# Patient Record
Sex: MALE | Race: WHITE | NOT HISPANIC OR LATINO
[De-identification: names, ages, dates, MRNs, and addresses within clinical notes are randomized per-mention and may not be internally consistent; named-entity substitution may affect disease eponyms.]

---

## 2017-01-18 ENCOUNTER — MEDICATION RENEWAL (OUTPATIENT)
Age: 82
End: 2017-01-18

## 2017-01-25 ENCOUNTER — OTHER (OUTPATIENT)
Age: 82
End: 2017-01-25

## 2017-02-13 ENCOUNTER — APPOINTMENT (OUTPATIENT)
Dept: NEPHROLOGY | Facility: CLINIC | Age: 82
End: 2017-02-13

## 2017-02-13 VITALS
HEART RATE: 60 BPM | DIASTOLIC BLOOD PRESSURE: 60 MMHG | WEIGHT: 233 LBS | BODY MASS INDEX: 37.45 KG/M2 | SYSTOLIC BLOOD PRESSURE: 128 MMHG

## 2017-02-13 DIAGNOSIS — D64.9 ANEMIA, UNSPECIFIED: ICD-10-CM

## 2017-02-13 RX ORDER — NEBIVOLOL HYDROCHLORIDE 10 MG/1
10 TABLET ORAL
Qty: 90 | Refills: 0 | Status: DISCONTINUED | COMMUNITY
Start: 2016-08-31

## 2017-03-10 ENCOUNTER — MEDICATION RENEWAL (OUTPATIENT)
Age: 82
End: 2017-03-10

## 2017-03-10 ENCOUNTER — RX RENEWAL (OUTPATIENT)
Age: 82
End: 2017-03-10

## 2017-04-04 ENCOUNTER — RX RENEWAL (OUTPATIENT)
Age: 82
End: 2017-04-04

## 2017-04-06 LAB
ANION GAP SERPL CALC-SCNC: 8 MMOL/L
BUN SERPL-MCNC: 64 MG/DL
CALCIUM SERPL-MCNC: 9.1 MG/DL
CHLORIDE SERPL-SCNC: 111 MMOL/L
CO2 SERPL-SCNC: 25 MMOL/L
CREAT SERPL-MCNC: 2.24 MG/DL
GLUCOSE SERPL-MCNC: 109 MG/DL
POTASSIUM SERPL-SCNC: 5.9 MMOL/L
SODIUM SERPL-SCNC: 144 MMOL/L

## 2017-04-11 ENCOUNTER — APPOINTMENT (OUTPATIENT)
Dept: ENDOCRINOLOGY | Facility: CLINIC | Age: 82
End: 2017-04-11

## 2017-06-20 ENCOUNTER — APPOINTMENT (OUTPATIENT)
Dept: NEPHROLOGY | Facility: CLINIC | Age: 82
End: 2017-06-20

## 2017-06-20 VITALS — HEART RATE: 80 BPM | SYSTOLIC BLOOD PRESSURE: 138 MMHG | DIASTOLIC BLOOD PRESSURE: 60 MMHG

## 2017-06-20 DIAGNOSIS — N18.3 CHRONIC KIDNEY DISEASE, STAGE 3 (MODERATE): ICD-10-CM

## 2017-06-20 RX ORDER — DOXYCYCLINE HYCLATE 100 MG/1
100 TABLET ORAL
Qty: 14 | Refills: 0 | Status: DISCONTINUED | COMMUNITY
Start: 2017-01-11

## 2017-06-20 RX ORDER — DOXYCYCLINE HYCLATE 100 MG/1
100 CAPSULE ORAL
Qty: 14 | Refills: 0 | Status: DISCONTINUED | COMMUNITY
Start: 2017-01-06 | End: 2017-06-20

## 2017-09-08 ENCOUNTER — APPOINTMENT (OUTPATIENT)
Dept: NEPHROLOGY | Facility: CLINIC | Age: 82
End: 2017-09-08
Payer: MEDICARE

## 2017-09-08 VITALS
WEIGHT: 217 LBS | HEART RATE: 72 BPM | DIASTOLIC BLOOD PRESSURE: 60 MMHG | SYSTOLIC BLOOD PRESSURE: 124 MMHG | BODY MASS INDEX: 34.87 KG/M2

## 2017-09-08 PROCEDURE — 99215 OFFICE O/P EST HI 40 MIN: CPT

## 2017-09-13 ENCOUNTER — APPOINTMENT (OUTPATIENT)
Dept: ENDOCRINOLOGY | Facility: CLINIC | Age: 82
End: 2017-09-13
Payer: MEDICARE

## 2017-09-13 VITALS
HEART RATE: 78 BPM | WEIGHT: 222.44 LBS | BODY MASS INDEX: 35.75 KG/M2 | DIASTOLIC BLOOD PRESSURE: 65 MMHG | SYSTOLIC BLOOD PRESSURE: 132 MMHG | HEIGHT: 66.14 IN

## 2017-09-13 PROCEDURE — 99214 OFFICE O/P EST MOD 30 MIN: CPT

## 2017-09-25 RX ORDER — ROSUVASTATIN CALCIUM 10 MG/1
10 TABLET, FILM COATED ORAL
Qty: 90 | Refills: 0 | Status: DISCONTINUED | COMMUNITY
Start: 2017-02-17

## 2017-10-11 ENCOUNTER — APPOINTMENT (OUTPATIENT)
Dept: ENDOCRINOLOGY | Facility: CLINIC | Age: 82
End: 2017-10-11
Payer: MEDICARE

## 2017-10-11 PROCEDURE — 97802 MEDICAL NUTRITION INDIV IN: CPT

## 2017-11-28 ENCOUNTER — OUTPATIENT (OUTPATIENT)
Dept: OUTPATIENT SERVICES | Facility: HOSPITAL | Age: 82
LOS: 1 days | End: 2017-11-28

## 2017-11-28 ENCOUNTER — APPOINTMENT (OUTPATIENT)
Dept: OPHTHALMOLOGY | Facility: CLINIC | Age: 82
End: 2017-11-28

## 2017-11-29 DIAGNOSIS — H18.51 ENDOTHELIAL CORNEAL DYSTROPHY: ICD-10-CM

## 2017-12-04 ENCOUNTER — APPOINTMENT (OUTPATIENT)
Dept: NEPHROLOGY | Facility: CLINIC | Age: 82
End: 2017-12-04
Payer: MEDICARE

## 2017-12-04 VITALS — SYSTOLIC BLOOD PRESSURE: 128 MMHG | HEART RATE: 76 BPM | DIASTOLIC BLOOD PRESSURE: 60 MMHG

## 2017-12-04 PROCEDURE — 99215 OFFICE O/P EST HI 40 MIN: CPT

## 2017-12-14 ENCOUNTER — MEDICATION RENEWAL (OUTPATIENT)
Age: 82
End: 2017-12-14

## 2018-01-31 ENCOUNTER — MEDICATION RENEWAL (OUTPATIENT)
Age: 83
End: 2018-01-31

## 2018-02-05 ENCOUNTER — APPOINTMENT (OUTPATIENT)
Dept: NEPHROLOGY | Facility: CLINIC | Age: 83
End: 2018-02-05
Payer: MEDICARE

## 2018-02-05 VITALS — SYSTOLIC BLOOD PRESSURE: 136 MMHG | DIASTOLIC BLOOD PRESSURE: 62 MMHG | HEART RATE: 80 BPM

## 2018-02-05 PROCEDURE — 99215 OFFICE O/P EST HI 40 MIN: CPT

## 2018-02-23 ENCOUNTER — RX RENEWAL (OUTPATIENT)
Age: 83
End: 2018-02-23

## 2018-03-07 ENCOUNTER — APPOINTMENT (OUTPATIENT)
Dept: ENDOCRINOLOGY | Facility: CLINIC | Age: 83
End: 2018-03-07
Payer: MEDICARE

## 2018-03-07 VITALS
HEART RATE: 81 BPM | WEIGHT: 215 LBS | HEIGHT: 66 IN | BODY MASS INDEX: 34.55 KG/M2 | DIASTOLIC BLOOD PRESSURE: 71 MMHG | SYSTOLIC BLOOD PRESSURE: 148 MMHG

## 2018-03-07 PROCEDURE — 99214 OFFICE O/P EST MOD 30 MIN: CPT

## 2018-03-13 ENCOUNTER — RX RENEWAL (OUTPATIENT)
Age: 83
End: 2018-03-13

## 2018-05-07 ENCOUNTER — APPOINTMENT (OUTPATIENT)
Dept: NEPHROLOGY | Facility: CLINIC | Age: 83
End: 2018-05-07
Payer: MEDICARE

## 2018-05-07 VITALS
BODY MASS INDEX: 34.06 KG/M2 | SYSTOLIC BLOOD PRESSURE: 138 MMHG | WEIGHT: 211 LBS | HEART RATE: 72 BPM | DIASTOLIC BLOOD PRESSURE: 70 MMHG

## 2018-05-07 PROCEDURE — 99214 OFFICE O/P EST MOD 30 MIN: CPT

## 2018-05-16 ENCOUNTER — RX RENEWAL (OUTPATIENT)
Age: 83
End: 2018-05-16

## 2018-06-25 ENCOUNTER — APPOINTMENT (OUTPATIENT)
Dept: ENDOCRINOLOGY | Facility: CLINIC | Age: 83
End: 2018-06-25
Payer: MEDICARE

## 2018-06-25 VITALS
HEART RATE: 71 BPM | WEIGHT: 220 LBS | SYSTOLIC BLOOD PRESSURE: 136 MMHG | HEIGHT: 66 IN | BODY MASS INDEX: 35.36 KG/M2 | DIASTOLIC BLOOD PRESSURE: 61 MMHG

## 2018-06-25 PROCEDURE — 99214 OFFICE O/P EST MOD 30 MIN: CPT

## 2018-07-13 RX ORDER — SODIUM BICARBONATE 650 MG/1
650 TABLET ORAL TWICE DAILY
Qty: 180 | Refills: 3 | Status: DISCONTINUED | COMMUNITY
Start: 2017-02-13 | End: 2018-07-13

## 2018-07-16 ENCOUNTER — MEDICATION RENEWAL (OUTPATIENT)
Age: 83
End: 2018-07-16

## 2018-07-22 ENCOUNTER — RX RENEWAL (OUTPATIENT)
Age: 83
End: 2018-07-22

## 2018-08-13 ENCOUNTER — APPOINTMENT (OUTPATIENT)
Dept: NEPHROLOGY | Facility: CLINIC | Age: 83
End: 2018-08-13
Payer: MEDICARE

## 2018-08-13 VITALS
DIASTOLIC BLOOD PRESSURE: 60 MMHG | HEART RATE: 72 BPM | WEIGHT: 218 LBS | BODY MASS INDEX: 35.19 KG/M2 | SYSTOLIC BLOOD PRESSURE: 136 MMHG

## 2018-08-13 PROCEDURE — 99215 OFFICE O/P EST HI 40 MIN: CPT

## 2018-08-13 RX ORDER — SODIUM POLYSTYRENE SULFONATE 4.1 MEQ/G
POWDER, FOR SUSPENSION ORAL; RECTAL
Qty: 450 | Refills: 3 | Status: DISCONTINUED | COMMUNITY
Start: 2017-04-04 | End: 2018-08-13

## 2018-08-21 ENCOUNTER — MEDICATION RENEWAL (OUTPATIENT)
Age: 83
End: 2018-08-21

## 2018-10-24 ENCOUNTER — APPOINTMENT (OUTPATIENT)
Dept: ENDOCRINOLOGY | Facility: CLINIC | Age: 83
End: 2018-10-24
Payer: MEDICARE

## 2018-10-24 VITALS
HEART RATE: 74 BPM | BODY MASS INDEX: 35.52 KG/M2 | WEIGHT: 221 LBS | DIASTOLIC BLOOD PRESSURE: 67 MMHG | HEIGHT: 66 IN | SYSTOLIC BLOOD PRESSURE: 143 MMHG

## 2018-10-24 PROCEDURE — 99214 OFFICE O/P EST MOD 30 MIN: CPT

## 2018-10-26 ENCOUNTER — MEDICATION RENEWAL (OUTPATIENT)
Age: 83
End: 2018-10-26

## 2018-10-26 RX ORDER — SEMAGLUTIDE 1.34 MG/ML
2 INJECTION, SOLUTION SUBCUTANEOUS
Qty: 1 | Refills: 1 | Status: DISCONTINUED | COMMUNITY
Start: 2018-10-24 | End: 2018-10-26

## 2018-11-06 ENCOUNTER — APPOINTMENT (OUTPATIENT)
Dept: NEPHROLOGY | Facility: CLINIC | Age: 83
End: 2018-11-06
Payer: MEDICARE

## 2018-11-06 VITALS — DIASTOLIC BLOOD PRESSURE: 58 MMHG | HEART RATE: 68 BPM | SYSTOLIC BLOOD PRESSURE: 128 MMHG

## 2018-11-06 PROCEDURE — 99215 OFFICE O/P EST HI 40 MIN: CPT

## 2019-01-23 ENCOUNTER — APPOINTMENT (OUTPATIENT)
Dept: ENDOCRINOLOGY | Facility: CLINIC | Age: 84
End: 2019-01-23
Payer: MEDICARE

## 2019-01-23 VITALS
SYSTOLIC BLOOD PRESSURE: 149 MMHG | WEIGHT: 215 LBS | DIASTOLIC BLOOD PRESSURE: 69 MMHG | BODY MASS INDEX: 34.7 KG/M2 | HEART RATE: 75 BPM

## 2019-01-23 PROCEDURE — 99214 OFFICE O/P EST MOD 30 MIN: CPT

## 2019-01-23 NOTE — HISTORY OF PRESENT ILLNESS
[FreeTextEntry1] : glucose log reviewed, testing 2x/day.\par am: 160 (today), 186, 140, 134, 148, 134, 211\par after dinner: 146, 197, 253, 202, 221\par had gout flare up on Jan 1\par BP is always lower with PCP than here\par got shingles vaccine and pneumonia vaccine booster\par went to [place] last month, was exercising more, swimming. lost some weight\par trying to eat less; when goes out to eat, will share entree with wife\par doing 1.5-2h of core and stretching while watching [employer].  walking in place at home, for 100 steps (each leg)\par no chest pain\par has occasional, fleeting neuropathy symptoms.\par \par PMH: CKD\par sleep apnea, has CPAP machine\par gout, arthritis\par Paget's s/p Reclast in 6/2014. alk phos normal since.\par \par Meds:\par Lantus 24 units, Victoza 1.8mg/day (ozempic not covered)\par Prandin 2mg tid with meals, TID. skips lunch dose if walking in afternoon\par amlodipine 10mg, doxazosin 8mg hs\par Flonase, azelastine, Claritin, montelukast\par furosemide 40mg, metolazone 5mg\par aspirin 81mg, Crestor 20mg\par ranitidine 150mg/day\par allopurinol 100mg, colchicine prn\par vitamin D 5000/day, Citrucel, Miralax, glucosamine\par Breeze strips testing 2x/day

## 2019-01-23 NOTE — ASSESSMENT
[FreeTextEntry1] : Diabetes, with nephropathy.  goal A1c < 7.0%,  A1c just above goal. Advised to eat out less, try to prepare more home meals, encouraged continued weight loss.  \par continue current regimen.\par RTO 4 months

## 2019-01-23 NOTE — DATA REVIEWED
[FreeTextEntry1] : 11/18: A1c 7.3%, tot chol 100, trig 106, HDL 43, LDL 36, Cr 2.43, TSH 2.49, alk phos 80\par 10/18: A1c 7.2%, tot chol 94, HDL 37, trig 222, LDL 29, Cr 2.96\par 8/18: tot chol 102, HDL 40, LDL 35\par 7/18: tot chol 115, HDL 48, LDL 37, trig 259, A1c 7.1%, Hb 12.7\par 5/18: A1c 7.3%,  tot chol 99, trig 87, HDL 41, LDL 41, Cr 2.68\par 2/18: Cr 2.39, tot chol 145, HDL 36, trig 439, , 25D 39\par 1/18: A1c 7.3%\par 12/17: A1c 7.1%, tot chol 204, trig 212, HDL 36, \par 9/17: Cr 2.71, alk phos 88, tot chol 205, HDL 38, , trig 369\par 5/17: A1c 5.9%, tot chol 113, trig 83, HDL 54, LDL 42, TSH 1.40, Cr 2.22

## 2019-01-23 NOTE — PHYSICAL EXAM
[Alert] : alert [Healthy Appearance] : healthy appearance [Normal Voice/Communication] : normal voice communication [No Proptosis] : no proptosis [No Lid Lag] : no lid lag [Normal Hearing] : hearing was normal [Thyroid Not Enlarged] : the thyroid was not enlarged [No Thyroid Nodules] : there were no palpable thyroid nodules [Clear to Auscultation] : lungs were clear to auscultation bilaterally [Normal S1, S2] : normal S1 and S2 [Regular Rhythm] : with a regular rhythm [Pedal Pulses Normal] : the pedal pulses are present [Normal Sensation on Monofilament Testing] : normal sensation on monofilament testing of lower extremities [Normal Affect] : the affect was normal [Normal Mood] : the mood was normal [Foot Ulcers] : no foot ulcers [de-identified] : fingerstick 123, post lunch [de-identified] : thyroid palpable [de-identified] : 2+ LE edema [de-identified] : no onychomycoses

## 2019-01-25 ENCOUNTER — MEDICATION RENEWAL (OUTPATIENT)
Age: 84
End: 2019-01-25

## 2019-01-25 RX ORDER — FLASH GLUCOSE SCANNING READER
EACH MISCELLANEOUS
Qty: 1 | Refills: 0 | Status: DISCONTINUED | COMMUNITY
Start: 2019-01-25 | End: 2019-01-25

## 2019-01-25 RX ORDER — BLOOD SUGAR DIAGNOSTIC, DISC
STRIP MISCELLANEOUS
Qty: 100 | Refills: 5 | Status: DISCONTINUED | COMMUNITY
Start: 2017-09-13 | End: 2019-01-25

## 2019-01-25 RX ORDER — FLASH GLUCOSE SENSOR
KIT MISCELLANEOUS
Qty: 2 | Refills: 5 | Status: DISCONTINUED | COMMUNITY
Start: 2019-01-25 | End: 2019-01-25

## 2019-01-31 ENCOUNTER — MEDICATION RENEWAL (OUTPATIENT)
Age: 84
End: 2019-01-31

## 2019-01-31 RX ORDER — LANCETS
EACH MISCELLANEOUS
Qty: 1 | Refills: 5 | Status: DISCONTINUED | COMMUNITY
Start: 2019-01-25 | End: 2019-01-31

## 2019-02-11 ENCOUNTER — MEDICATION RENEWAL (OUTPATIENT)
Age: 84
End: 2019-02-11

## 2019-02-26 ENCOUNTER — APPOINTMENT (OUTPATIENT)
Dept: NEPHROLOGY | Facility: CLINIC | Age: 84
End: 2019-02-26
Payer: MEDICARE

## 2019-02-26 VITALS
WEIGHT: 214 LBS | HEART RATE: 68 BPM | SYSTOLIC BLOOD PRESSURE: 126 MMHG | DIASTOLIC BLOOD PRESSURE: 66 MMHG | BODY MASS INDEX: 34.54 KG/M2

## 2019-02-26 PROCEDURE — 99215 OFFICE O/P EST HI 40 MIN: CPT

## 2019-02-26 NOTE — HISTORY OF PRESENT ILLNESS
[FreeTextEntry1] : 82 y/o man here for f/u evaluation of CKD 4, HTN, DM, CAD,  proteinuria, gout, hyperkalemia, hyperlipidemia, LE edema and anemia, KACI on CPAP.\par Had 1 episode of gout in Jan, 2019 (more frequent visits)\par trying to be mindful\par weight stable 216\par Denies flank pain, dysuria, hematuria. No CP or SOB\par left shin with "bump"- mild tenderness no trauma\par

## 2019-02-26 NOTE — REASON FOR VISIT
[Follow-Up] : a follow-up visit [FreeTextEntry1] : for CKD 4, HTN proteinuria, anemia, hyperuricemia and  LE edema

## 2019-02-26 NOTE — REVIEW OF SYSTEMS
[Fever] : no fever [Chills] : no chills [Feeling Poorly] : not feeling poorly [Feeling Tired] : feeling tired [Heart Rate Is Slow] : slow heart rate [Heart Rate Is Fast] : the heart rate was not fast [Chest Pain] : no chest pain [Palpitations] : no palpitations [Negative] : Heme/Lymph

## 2019-02-26 NOTE — ASSESSMENT
[FreeTextEntry1] : all lab data was reviewed with patient in detail from 2/20/2019\par 82 yo man with CKD 4, DM, hyperuricemia; edema, HTN proteinuria and anemia \par -- CKD 4-  creat stabilized at 2.96; not uremic\par remains aware of possible need for RRT in future.- \par --hyperuricemia/gout --trial of increase allopurinol to 250 mg (100 mg tabs 2 1/2 tabs daily)\par has not needed NSAIDs recently\par --HTN-- BP at goal- but reports  that some BPs at other MD offices have been elevated- counseled on need to avoid salt-- \par trying a touch less diuretic to see if this helps reduce B/creat without worsening BP or edema-low salt intake imperative\par -edema- looks good- trial of reduce metolazone from daily to every other day\par -Metabolic acidosis-  CO2   31- stable range- but will reduce diuretic as above\par -hyperkalemia-    K remains < 5--  c/w  potassium restriction\par -. Anemia Hgb stable at 11.8- very good--  No NAVIN therapy needed at this time \par -proteinuria-  keep off RAAS blockade for elevated creat and potassium \par -overweight -  emphasized need for weight loss and exercise\par -skin lesion-  left shin- advised to f/u with derm\par f/u OV 3 months\par

## 2019-02-26 NOTE — PHYSICAL EXAM
[General Appearance - Alert] : alert [General Appearance - Well Nourished] : well nourished [General Appearance - Well Developed] : well developed [Sclera] : the sclera and conjunctiva were normal [PERRL With Normal Accommodation] : pupils were equal in size, round, and reactive to light [Extraocular Movements] : extraocular movements were intact [Neck Appearance] : the appearance of the neck was normal [Neck Cervical Mass (___cm)] : no neck mass was observed [Jugular Venous Distention Increased] : there was no jugular-venous distention [] : no respiratory distress [Auscultation Breath Sounds / Voice Sounds] : lungs were clear to auscultation bilaterally [Heart Sounds] : normal S1 and S2 [Heart Sounds Gallop] : no gallops [Murmurs] : no murmurs [Heart Sounds Pericardial Friction Rub] : no pericardial rub [Cervical Lymph Nodes Enlarged Anterior Bilaterally] : anterior cervical [Supraclavicular Lymph Nodes Enlarged Bilaterally] : supraclavicular [No CVA Tenderness] : no ~M costovertebral angle tenderness [No Spinal Tenderness] : no spinal tenderness [Abnormal Walk] : normal gait [FreeTextEntry1] : stasis changes improved- 1cm area of keratosis narrow rim of erythema- [Oriented To Time, Place, And Person] : oriented to person, place, and time

## 2019-03-05 ENCOUNTER — RX RENEWAL (OUTPATIENT)
Age: 84
End: 2019-03-05

## 2019-03-14 ENCOUNTER — MEDICATION RENEWAL (OUTPATIENT)
Age: 84
End: 2019-03-14

## 2019-04-01 ENCOUNTER — MEDICATION RENEWAL (OUTPATIENT)
Age: 84
End: 2019-04-01

## 2019-04-08 ENCOUNTER — RX RENEWAL (OUTPATIENT)
Age: 84
End: 2019-04-08

## 2019-04-24 ENCOUNTER — RX RENEWAL (OUTPATIENT)
Age: 84
End: 2019-04-24

## 2019-04-29 ENCOUNTER — MEDICATION RENEWAL (OUTPATIENT)
Age: 84
End: 2019-04-29

## 2019-05-22 ENCOUNTER — APPOINTMENT (OUTPATIENT)
Dept: ENDOCRINOLOGY | Facility: CLINIC | Age: 84
End: 2019-05-22
Payer: MEDICARE

## 2019-05-22 VITALS
DIASTOLIC BLOOD PRESSURE: 55 MMHG | WEIGHT: 218 LBS | SYSTOLIC BLOOD PRESSURE: 125 MMHG | BODY MASS INDEX: 35.19 KG/M2 | HEART RATE: 64 BPM

## 2019-05-22 PROCEDURE — 99214 OFFICE O/P EST MOD 30 MIN: CPT

## 2019-05-23 NOTE — HISTORY OF PRESENT ILLNESS
[FreeTextEntry1] : not testing glucose regularly\par was in [place] for two weeks and swam most days\par no polyuria, polydipsia, SOB, chest pain, or neuropathy symptoms \par saw ophtho last week.  has "very small amount of diabetes, but no change from 3months ago"\par seldom has hypoglycemia symptoms, typically before dinner after prolonged walking\par melanoma removed from top of head three weeks ago\par \par PMH: CKD\par sleep apnea, has CPAP machine\par gout, arthritis\par Paget's s/p Reclast in 6/2014. alk phos normal since.\par melanoma, top of head 2019 s/p Moh's\par \par Meds:\par Lantus 24 units, Victoza 1.8mg/day (ozempic not covered)\par Prandin 2mg tid with meals, TID. skips lunch dose if walking in afternoon\par amlodipine 10mg, doxazosin 8mg hs\par Flonase, azelastine, Claritin, montelukast\par furosemide 40mg, metolazone 5mg\par aspirin 81mg, Crestor 20mg\par ranitidine 150mg/day\par allopurinol 100mg, colchicine prn\par vitamin D 5000/day, Citrucel, Miralax, glucosamine\par Breeze strips testing 2x/day

## 2019-05-23 NOTE — ASSESSMENT
[FreeTextEntry1] : Diabetes, CKD.  A1c at/below goal.  A1c lower than last visit, which I hope is from improvement of glucose control and not from CKD (which can cause falsely low A1c readings).\par continue current regimen.\par RTO 4 months

## 2019-05-23 NOTE — DATA REVIEWED
[FreeTextEntry1] : 5/19: A1c 6.7%, Cr 3.27\par 2/19: A1c 7.1%, Cr 3.04, tot chol 108, trig 141, HDL 48, LDL 38\par 10/18: A1c 7.2%, tot chol 94, HDL 37, trig 222, LDL 29, Cr 2.96\par 8/18: tot chol 102, HDL 40, LDL 35\par 7/18: tot chol 115, HDL 48, LDL 37, trig 259, A1c 7.1%, Hb 12.7\par 5/18: A1c 7.3%,  tot chol 99, trig 87, HDL 41, LDL 41, Cr 2.68\par 2/18: Cr 2.39, tot chol 145, HDL 36, trig 439, , 25D 39\par 1/18: A1c 7.3%\par 12/17: A1c 7.1%, tot chol 204, trig 212, HDL 36, \par 9/17: Cr 2.71, alk phos 88, tot chol 205, HDL 38, , trig 369\par 5/17: A1c 5.9%, tot chol 113, trig 83, HDL 54, LDL 42, TSH 1.40, Cr 2.22

## 2019-05-23 NOTE — PHYSICAL EXAM
[Alert] : alert [Healthy Appearance] : healthy appearance [Normal Voice/Communication] : normal voice communication [No Proptosis] : no proptosis [No Lid Lag] : no lid lag [Normal Hearing] : hearing was normal [Thyroid Not Enlarged] : the thyroid was not enlarged [No Thyroid Nodules] : there were no palpable thyroid nodules [Clear to Auscultation] : lungs were clear to auscultation bilaterally [Normal S1, S2] : normal S1 and S2 [Regular Rhythm] : with a regular rhythm [Pedal Pulses Normal] : the pedal pulses are present [Normal Sensation on Monofilament Testing] : normal sensation on monofilament testing of lower extremities [Normal Affect] : the affect was normal [Normal Mood] : the mood was normal [Foot Ulcers] : no foot ulcers [Acanthosis Nigricans] : no acanthosis nigricans [de-identified] : fingerstick 90, 2h post lunch (salad) [de-identified] : thyroid palpable [de-identified] : trace edema [de-identified] : no onychomycoses. actinic keratosis lesion on LLE, 2cm

## 2019-06-07 ENCOUNTER — MOBILE ON CALL (OUTPATIENT)
Age: 84
End: 2019-06-07

## 2019-06-12 ENCOUNTER — APPOINTMENT (OUTPATIENT)
Dept: NEPHROLOGY | Facility: CLINIC | Age: 84
End: 2019-06-12
Payer: MEDICARE

## 2019-06-12 VITALS — SYSTOLIC BLOOD PRESSURE: 150 MMHG | HEART RATE: 67 BPM | DIASTOLIC BLOOD PRESSURE: 70 MMHG

## 2019-06-12 PROCEDURE — 99214 OFFICE O/P EST MOD 30 MIN: CPT

## 2019-06-15 NOTE — REVIEW OF SYSTEMS
[Feeling Tired] : feeling tired [Heart Rate Is Slow] : slow heart rate [Negative] : Endocrine [Fever] : no fever [Chills] : no chills [Feeling Poorly] : not feeling poorly [Heart Rate Is Fast] : the heart rate was not fast [Chest Pain] : no chest pain [Palpitations] : no palpitations

## 2019-06-15 NOTE — PHYSICAL EXAM
[General Appearance - Alert] : alert [General Appearance - Well Nourished] : well nourished [General Appearance - Well Developed] : well developed [Sclera] : the sclera and conjunctiva were normal [PERRL With Normal Accommodation] : pupils were equal in size, round, and reactive to light [Neck Appearance] : the appearance of the neck was normal [Extraocular Movements] : extraocular movements were intact [Neck Cervical Mass (___cm)] : no neck mass was observed [Jugular Venous Distention Increased] : there was no jugular-venous distention [] : no respiratory distress [Auscultation Breath Sounds / Voice Sounds] : lungs were clear to auscultation bilaterally [Heart Sounds] : normal S1 and S2 [Heart Sounds Pericardial Friction Rub] : no pericardial rub [Heart Sounds Gallop] : no gallops [Murmurs] : no murmurs [Cervical Lymph Nodes Enlarged Anterior Bilaterally] : anterior cervical [Supraclavicular Lymph Nodes Enlarged Bilaterally] : supraclavicular [No CVA Tenderness] : no ~M costovertebral angle tenderness [Abnormal Walk] : normal gait [No Spinal Tenderness] : no spinal tenderness [Oriented To Time, Place, And Person] : oriented to person, place, and time [FreeTextEntry1] : stasis changes improved

## 2019-06-15 NOTE — ASSESSMENT
[FreeTextEntry1] : all lab data was reviewed with patient in detail from  6/6/2019\par 82 yo man with CKD 4, DM, hyperuricemia; edema, HTN proteinuria and anemia . \par -- CKD 4- creatinine at 3.49 from 3.2;  electrolytes okay; euvolemic,\par  no uremic features, talked again for the need of RRT in the near future - he verbalized understanding for that  \par --hyperuricemia/gout --no recent attack - on allopurinol 2 tabl. 1/2 \par --HTN-- not on goal 150/90 - talked to him to continue to monitor it in the next few days and to report if it is still on the high side can increase his medications the diuretics  to 40 mg twice a day - Lasix \par - diabetes - acceptable  HA1c - 7.0 - the endocrinologist wants to switch to another medications - from renal \par prospective no contraindication if it is renal dosed\par --anemia- Hgb 11.2- okay TSat drifting down- will need to start Fe tabs\par -proteinuria stable- optimize with BP control and weight loss\par \par Follow up in 3 to 4 months

## 2019-06-15 NOTE — HISTORY OF PRESENT ILLNESS
[FreeTextEntry1] : 84 y/o man with CKD 4, HTN, DM, CAD,  proteinuria, gout, hyperkalemia, here for f/u evaluation.\par  No new complaints - no shortness of breath, no chest pain, no swelling of the lower extremities, no urinary problems.\par  Had a recent excision of melanoma over his scalp. \par using  lasix 20 twice a day, Metolazone 5 mg and amlodipine 5 mg. \par Fair control in the mornings around 130/80 BP and in the afternoons around 140/90\par Denies flank pain, dysuria, hematuria or frothy urine \par

## 2019-06-26 ENCOUNTER — APPOINTMENT (OUTPATIENT)
Dept: NEPHROLOGY | Facility: CLINIC | Age: 84
End: 2019-06-26

## 2019-07-09 ENCOUNTER — MEDICATION RENEWAL (OUTPATIENT)
Age: 84
End: 2019-07-09

## 2019-07-09 RX ORDER — LIRAGLUTIDE 6 MG/ML
18 INJECTION SUBCUTANEOUS
Qty: 1 | Refills: 5 | Status: DISCONTINUED | COMMUNITY
Start: 2018-10-26 | End: 2019-07-09

## 2019-09-11 ENCOUNTER — APPOINTMENT (OUTPATIENT)
Dept: NEPHROLOGY | Facility: CLINIC | Age: 84
End: 2019-09-11
Payer: MEDICARE

## 2019-09-11 VITALS — HEART RATE: 66 BPM | DIASTOLIC BLOOD PRESSURE: 68 MMHG | SYSTOLIC BLOOD PRESSURE: 136 MMHG

## 2019-09-11 PROCEDURE — 99215 OFFICE O/P EST HI 40 MIN: CPT

## 2019-09-15 NOTE — HISTORY OF PRESENT ILLNESS
[FreeTextEntry1] : 83 y/o man here for f/u evaluation of CKD 4, HTN, DM, CAD,  proteinuria, gout, hyperkalemia.\par since last OV had 1 gout attack- used colchicine-\par feels okay today.\par using  lasix 20 twice a day, Metolazone 5 mg and amlodipine 5 mg. \par home BPs variable\par Denies flank pain, dysuria, hematuria or frothy urine \par

## 2019-09-15 NOTE — REVIEW OF SYSTEMS
[Feeling Tired] : feeling tired [Heart Rate Is Slow] : slow heart rate [Negative] : Heme/Lymph [Fever] : no fever [Chills] : no chills [Heart Rate Is Fast] : the heart rate was not fast [Feeling Poorly] : not feeling poorly [Chest Pain] : no chest pain [Palpitations] : no palpitations

## 2019-09-15 NOTE — PHYSICAL EXAM
[General Appearance - Alert] : alert [General Appearance - Well Nourished] : well nourished [General Appearance - Well Developed] : well developed [Sclera] : the sclera and conjunctiva were normal [PERRL With Normal Accommodation] : pupils were equal in size, round, and reactive to light [Extraocular Movements] : extraocular movements were intact [Neck Appearance] : the appearance of the neck was normal [Neck Cervical Mass (___cm)] : no neck mass was observed [Jugular Venous Distention Increased] : there was no jugular-venous distention [] : no respiratory distress [Auscultation Breath Sounds / Voice Sounds] : lungs were clear to auscultation bilaterally [Heart Sounds] : normal S1 and S2 [Heart Sounds Gallop] : no gallops [Murmurs] : no murmurs [Heart Sounds Pericardial Friction Rub] : no pericardial rub [Cervical Lymph Nodes Enlarged Anterior Bilaterally] : anterior cervical [Supraclavicular Lymph Nodes Enlarged Bilaterally] : supraclavicular [No CVA Tenderness] : no ~M costovertebral angle tenderness [No Spinal Tenderness] : no spinal tenderness [Abnormal Walk] : normal gait [Oriented To Time, Place, And Person] : oriented to person, place, and time [FreeTextEntry1] : tr- 1+ ankle edema

## 2019-09-15 NOTE — ASSESSMENT
[FreeTextEntry1] : all lab data was reviewed with patient in detail from 9/5/2019\par 83 yo man with CKD 4, DM, hyperuricemia; edema, HTN proteinuria and anemia . \par -- CKD 4-  creat up- 3.86; eGFR 14electrolytes acceptable\par no uremic symptoms\par had detailed discussion in regard to future need for renal replacement therapy including all modalities- HD, PD and transplant - age limits tx unless present with living donor  \par --hyperuricemia/gout -- UA 5.2- okay; needs weight loss allopurinol 2 tabl. 1/2 \par --HTN-- not on goal 150/90 - talked to him to continue to monitor it in the next few days and to report if it is still on the high side can increase his medications the diuretics  to 40 mg twice a day - Lasix \par - diabetes -  HA1c - 6.8%- okay\par --anemia- Hgb 11.3- stable c/w Fe tabs\par -proteinuria stable-  2.1 gm- \par -hyperparathyroidism- PTH- 154 acceptable\par -hyperphosphatemia- start binders if rising for next OV\par Follow up 2 months

## 2019-09-18 ENCOUNTER — APPOINTMENT (OUTPATIENT)
Dept: ENDOCRINOLOGY | Facility: CLINIC | Age: 84
End: 2019-09-18
Payer: MEDICARE

## 2019-09-18 VITALS
DIASTOLIC BLOOD PRESSURE: 61 MMHG | HEIGHT: 66 IN | HEART RATE: 62 BPM | BODY MASS INDEX: 35.36 KG/M2 | WEIGHT: 220 LBS | SYSTOLIC BLOOD PRESSURE: 150 MMHG

## 2019-09-18 PROCEDURE — 99214 OFFICE O/P EST MOD 30 MIN: CPT

## 2019-09-18 RX ORDER — PEN NEEDLE, DIABETIC 32 GX 1/4"
32G X 6 MM NEEDLE, DISPOSABLE MISCELLANEOUS
Qty: 100 | Refills: 5 | Status: DISCONTINUED | COMMUNITY
Start: 2018-07-16 | End: 2019-09-18

## 2019-09-18 RX ORDER — METOLAZONE 5 MG/1
5 TABLET ORAL DAILY
Qty: 90 | Refills: 3 | Status: DISCONTINUED | COMMUNITY
Start: 2017-06-20 | End: 2019-09-18

## 2019-09-18 NOTE — DATA REVIEWED
[FreeTextEntry1] : 9/19: A1c 6.8%, tot chol 112, HDL 42, trig 245, LDL 39, Cr 3.86\par 5/19: A1c 6.7%, Cr 3.27\par 2/19: A1c 7.1%, Cr 3.04, tot chol 108, trig 141, HDL 48, LDL 38\par 10/18: A1c 7.2%, tot chol 94, HDL 37, trig 222, LDL 29, Cr 2.96\par 8/18: tot chol 102, HDL 40, LDL 35\par 7/18: tot chol 115, HDL 48, LDL 37, trig 259, A1c 7.1%, Hb 12.7\par 5/18: A1c 7.3%,  tot chol 99, trig 87, HDL 41, LDL 41, Cr 2.68\par 2/18: Cr 2.39, tot chol 145, HDL 36, trig 439, , 25D 39\par 1/18: A1c 7.3%\par 12/17: A1c 7.1%, tot chol 204, trig 212, HDL 36, \par 9/17: Cr 2.71, alk phos 88, tot chol 205, HDL 38, , trig 369\par 5/17: A1c 5.9%, tot chol 113, trig 83, HDL 54, LDL 42, TSH 1.40, Cr 2.22

## 2019-09-18 NOTE — PHYSICAL EXAM
[Alert] : alert [Healthy Appearance] : healthy appearance [Normal Voice/Communication] : normal voice communication [No Proptosis] : no proptosis [No Lid Lag] : no lid lag [Thyroid Not Enlarged] : the thyroid was not enlarged [Normal Hearing] : hearing was normal [No Thyroid Nodules] : there were no palpable thyroid nodules [Normal S1, S2] : normal S1 and S2 [Clear to Auscultation] : lungs were clear to auscultation bilaterally [Pedal Pulses Normal] : the pedal pulses are present [Regular Rhythm] : with a regular rhythm [Normal Sensation on Monofilament Testing] : normal sensation on monofilament testing of lower extremities [Normal Affect] : the affect was normal [Normal Mood] : the mood was normal [Foot Ulcers] : no foot ulcers [Acanthosis Nigricans] : no acanthosis nigricans [de-identified] : thyroid palpable [de-identified] : trace edema [de-identified] : no onychomycoses

## 2019-09-18 NOTE — HISTORY OF PRESENT ILLNESS
[FreeTextEntry1] : not testing glucose \par has stress test scheduled (pharmacologic) and will be NPO for the test. worried about hypoglycemia, which occurs sometimes anyway when he is late with his lunch.\par had one hypoglycemia in middle of the night, had taken all his meds (including Prandin) at bedtime instead of dinner time.\par kidney function is declining and Dr Dover talked to him about dialysis options if Cr does not improve (will be retested next month)\par saw ophtho in the summer, was told he has "virtually no DR" -- only the very smallest amount\par no chest pain or SOB\par no neuropathy symptoms\par \par \par PMH: CKD\par sleep apnea, has CPAP machine\par gout, arthritis\par Paget's s/p Reclast in 6/2014. 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 skips lunch dose if walking in afternoon\par amlodipine 10mg, doxazosin 8mg hs\par Flonase, azelastine, Claritin, montelukast\par furosemide 20mg once/day\par aspirin 81mg, Crestor 20mg\par ranitidine 150mg/day\par allopurinol 100mg, colchicine prn\par vitamin D 5000/day, Citrucel, Miralax, glucosamine\par Breeze strips testing 2x/day

## 2019-09-18 NOTE — ASSESSMENT
[FreeTextEntry1] : Diabetes, CKD.  A1c at/below goal.  \par will reduce Prandin to 1mg with meals, since A1c has been below goal and he is reporting occasional hypoglycemia symptoms.  Advised to test sugars to make sure glucose is not increasing with reduction in Prandin dose (since currently he is not testing--restart testing)\par reduce Lantus to 20 units the night before stress test, and hold prandin on the morning of stress test.  Resume taking once he is eating normal po intake.\par Continue Ozempic\par RTO 4 months\par

## 2019-09-23 ENCOUNTER — OUTPATIENT (OUTPATIENT)
Dept: OUTPATIENT SERVICES | Facility: HOSPITAL | Age: 84
LOS: 1 days | End: 2019-09-23
Payer: MEDICARE

## 2019-09-23 DIAGNOSIS — R06.2 WHEEZING: ICD-10-CM

## 2019-09-23 DIAGNOSIS — R06.02 SHORTNESS OF BREATH: ICD-10-CM

## 2019-09-23 DIAGNOSIS — E11.9 TYPE 2 DIABETES MELLITUS WITHOUT COMPLICATIONS: ICD-10-CM

## 2019-09-23 DIAGNOSIS — I10 ESSENTIAL (PRIMARY) HYPERTENSION: ICD-10-CM

## 2019-09-23 LAB — GLUCOSE BLDC GLUCOMTR-MCNC: 94 MG/DL — SIGNIFICANT CHANGE UP (ref 70–99)

## 2019-09-23 PROCEDURE — 82962 GLUCOSE BLOOD TEST: CPT

## 2019-09-23 PROCEDURE — 78452 HT MUSCLE IMAGE SPECT MULT: CPT

## 2019-09-23 PROCEDURE — A9500: CPT

## 2019-09-23 PROCEDURE — 93018 CV STRESS TEST I&R ONLY: CPT

## 2019-09-23 PROCEDURE — 93017 CV STRESS TEST TRACING ONLY: CPT

## 2019-09-23 PROCEDURE — 78452 HT MUSCLE IMAGE SPECT MULT: CPT | Mod: 26

## 2019-09-23 PROCEDURE — 93016 CV STRESS TEST SUPVJ ONLY: CPT

## 2019-10-04 ENCOUNTER — RX RENEWAL (OUTPATIENT)
Age: 84
End: 2019-10-04

## 2019-10-24 ENCOUNTER — APPOINTMENT (OUTPATIENT)
Dept: NEPHROLOGY | Facility: CLINIC | Age: 84
End: 2019-10-24
Payer: MEDICARE

## 2019-10-24 VITALS — DIASTOLIC BLOOD PRESSURE: 58 MMHG | HEART RATE: 60 BPM | SYSTOLIC BLOOD PRESSURE: 136 MMHG

## 2019-10-24 VITALS — DIASTOLIC BLOOD PRESSURE: 57 MMHG | HEART RATE: 59 BPM | SYSTOLIC BLOOD PRESSURE: 144 MMHG

## 2019-10-24 DIAGNOSIS — E87.2 ACIDOSIS: ICD-10-CM

## 2019-10-24 PROCEDURE — 99215 OFFICE O/P EST HI 40 MIN: CPT

## 2019-10-24 NOTE — ASSESSMENT
[FreeTextEntry1] : all lab data was reviewed with patient in detail from 10/16/2019\par 85 yo man with CKD 4, DM, hyperuricemia; edema, HTN, proteinuria and anemia\par --CKD 4-  creat down to 3.16 (eGFR 17) from 3.86 (eGFR 14) last month. electrolytes acceptable, no uremic symptoms but discussed again in regard to future need for renal replacement\par --hyperuricemia/gout -- UA 5.2- okay; needs weight loss, allopurinol 2 tab 1/2 \par --HTN-- not on goal, labile -  Instructed to increase lasix and continue to monitor BP daily while taking lasix 2 tabs twice daily for next 3 days, then only twice daily.  Take only one daily if wt 212lbs, increase to 3 or 4 daily if wt >215 and hold for weight < 210\par -- diabetes -  HA1c - 6.8%- okay\par --anemia- Hgb down to 10.4 from 11.3 last month- TSat 22%- try increase Fe to 2 tabs daily- if causes GI distress or constipation, then will dc and arrange for IVFe- uses miralax regularly\par  If Hgb decreases < 10 will begin procrit. \par -proteinuria stable-  2.1 gm- \par -hyperparathyroidism- PTH- 154 acceptable\par -hyperphosphatemia- start binders if rising for next OV\par \par labs ordered for next visit\par Follow up 6-8 weeks

## 2019-10-24 NOTE — REASON FOR VISIT
[Follow-Up] : a follow-up visit [Other: _____] : [unfilled] [FreeTextEntry1] : for CKD 4, HTN proteinuria, anemia, hyperuricemia and LE edema

## 2019-10-24 NOTE — PHYSICAL EXAM
[General Appearance - Well Nourished] : well nourished [General Appearance - Alert] : alert [PERRL With Normal Accommodation] : pupils were equal in size, round, and reactive to light [Sclera] : the sclera and conjunctiva were normal [General Appearance - Well Developed] : well developed [Extraocular Movements] : extraocular movements were intact [Neck Appearance] : the appearance of the neck was normal [Neck Cervical Mass (___cm)] : no neck mass was observed [Jugular Venous Distention Increased] : there was no jugular-venous distention [] : no respiratory distress [Auscultation Breath Sounds / Voice Sounds] : lungs were clear to auscultation bilaterally [Heart Sounds] : normal S1 and S2 [Heart Sounds Gallop] : no gallops [Murmurs] : no murmurs [Heart Sounds Pericardial Friction Rub] : no pericardial rub [Cervical Lymph Nodes Enlarged Anterior Bilaterally] : anterior cervical [Supraclavicular Lymph Nodes Enlarged Bilaterally] : supraclavicular [No CVA Tenderness] : no ~M costovertebral angle tenderness [No Spinal Tenderness] : no spinal tenderness [Abnormal Walk] : normal gait [Oriented To Time, Place, And Person] : oriented to person, place, and time [Heart Rate And Rhythm] : heart rate was normal and rhythm regular [No Focal Deficits] : no focal deficits [FreeTextEntry1] : stasis changes improved

## 2019-10-24 NOTE — HISTORY OF PRESENT ILLNESS
[FreeTextEntry1] : 83 y/o man for f/u evaluation of CKD 4, HTN, DM, CAD, proteinuria, gout, hyperkalemia.\par Feeling well today. \par From last OV he had understood that he was to decrease his lasix to 20mg daily, but now more swelling. Also had stopped metolazone since last visit. \par Did not check BPs this past month, was okay at recent stress test, reported to be negative exam. Usually variable BP readings. \par State that his weight about 212lbs a couple weeks ago.  Up to about 217 now. \par followed up with endo - prandin reduced to 1mg from 2mg daily\par last gout attack this past summer- used colchicine-\par Denies flank pain, dysuria, hematuria or frothy urine \par Denies HA, CP, SOB.

## 2019-10-24 NOTE — REVIEW OF SYSTEMS
[Feeling Tired] : feeling tired [Heart Rate Is Slow] : slow heart rate [Negative] : Heme/Lymph [Fever] : no fever [Chills] : no chills [Feeling Poorly] : not feeling poorly [Heart Rate Is Fast] : the heart rate was not fast [Chest Pain] : no chest pain [Palpitations] : no palpitations

## 2019-10-25 RX ORDER — ERYTHROPOIETIN 20000 [IU]/ML
20000 INJECTION, SOLUTION INTRAVENOUS; SUBCUTANEOUS
Qty: 8 | Refills: 3 | Status: DISCONTINUED | COMMUNITY
Start: 2017-03-10 | End: 2019-10-25

## 2019-12-05 ENCOUNTER — APPOINTMENT (OUTPATIENT)
Dept: NEPHROLOGY | Facility: CLINIC | Age: 84
End: 2019-12-05
Payer: MEDICARE

## 2019-12-05 ENCOUNTER — MEDICATION RENEWAL (OUTPATIENT)
Age: 84
End: 2019-12-05

## 2019-12-05 VITALS — SYSTOLIC BLOOD PRESSURE: 136 MMHG | DIASTOLIC BLOOD PRESSURE: 54 MMHG | HEART RATE: 80 BPM

## 2019-12-05 DIAGNOSIS — E78.5 HYPERLIPIDEMIA, UNSPECIFIED: ICD-10-CM

## 2019-12-05 DIAGNOSIS — I10 ESSENTIAL (PRIMARY) HYPERTENSION: ICD-10-CM

## 2019-12-05 DIAGNOSIS — E79.0 HYPERURICEMIA W/OUT SIGNS OF INFLAMMATORY ARTHRITIS AND TOPHACEOUS DISEASE: ICD-10-CM

## 2019-12-05 PROCEDURE — 99214 OFFICE O/P EST MOD 30 MIN: CPT

## 2019-12-06 NOTE — PHYSICAL EXAM
[General Appearance - Alert] : alert [General Appearance - Well Nourished] : well nourished [General Appearance - Well Developed] : well developed [Sclera] : the sclera and conjunctiva were normal [PERRL With Normal Accommodation] : pupils were equal in size, round, and reactive to light [Extraocular Movements] : extraocular movements were intact [Neck Appearance] : the appearance of the neck was normal [Neck Cervical Mass (___cm)] : no neck mass was observed [Jugular Venous Distention Increased] : there was no jugular-venous distention [] : no respiratory distress [Auscultation Breath Sounds / Voice Sounds] : lungs were clear to auscultation bilaterally [Heart Rate And Rhythm] : heart rate was normal and rhythm regular [Heart Sounds] : normal S1 and S2 [Heart Sounds Gallop] : no gallops [Murmurs] : no murmurs [Heart Sounds Pericardial Friction Rub] : no pericardial rub [Cervical Lymph Nodes Enlarged Anterior Bilaterally] : anterior cervical [No CVA Tenderness] : no ~M costovertebral angle tenderness [Supraclavicular Lymph Nodes Enlarged Bilaterally] : supraclavicular [No Spinal Tenderness] : no spinal tenderness [Abnormal Walk] : normal gait [No Focal Deficits] : no focal deficits [Oriented To Time, Place, And Person] : oriented to person, place, and time [FreeTextEntry1] : stasis changes improved

## 2019-12-06 NOTE — HISTORY OF PRESENT ILLNESS
[FreeTextEntry1] : 83 yo man with hx of CKD 4, HTN, DM, CAD, proteinuria, gout, hyperkalemia for f/u evaluation.\par Still feeling well, no N/V, or uremic symptoms.  \par Taking lasix 20mg BID.  Reports weight being in stable range between 212-215.  States being 213 today.\par Off metolazone.  Some swelling but controlled.  \par Has not been checking BPs but states that it is okay at other doctor visits (120/70).  Recent stress test reportedly negative.  Usually variable BP readings. \par last gout attack this past summer- used colchicine-\par Denies flank pain, dysuria, hematuria or frothy urine \par Denies HA, CP, SOB.

## 2019-12-06 NOTE — ASSESSMENT
[FreeTextEntry1] : all lab data was reviewed with patient in detail from 11/12 and 11/22/2019\par 83 yo man with CKD 4/5, DM, hyperuricemia; edema, HTN, proteinuria and anemia\par --CKD 4/5-  creat up 4.29 (eGFR 12) from 3.16 (eGFR 17) in Oct. electrolytes acceptable, no uremic symptoms but again had long discussion about future need for RRT or renal transplant after discussing with family members (daughter mentioned she would be willing to donate).  If agrees, he is to schedule appointment with transplant center. Would proceed with HD, considering home hemo. (PD poor option given body habitus and hx of constipation)\par --hyperuricemia/gout - UA 5.4 okay; needs weight loss, continue allopurinol 2.5 tabs daily\par --HTN- acceptable today -  continue lasix BID. to monitor BP more frequently. Take only one daily if wt <210lbs, increase to 3 or 4 daily if wt >215. \par -- diabetes -  HgbA1C 5.7%- good\par --anemia- Hgb 10.8 acceptable- TSat 16%- continue Fe  2 tabs daily- okay to defer IVFe for now- uses miralax regularly\par  If Hgb decreases < 10 will begin procrit. \par -proteinuria - rise from 2.1 gm to 6g,  will have him repeat. Off RAAS blockade for increased Cr and hyperkalemia\par -hyperparathyroidism- , previously 154 in Sept.  If continues to rise, add 1,25 vit D\par -hyperphosphatemia- phos 5.1 okay. start binders if rising\par \par labs ordered for next visit\par Follow up 6-8 weeks

## 2019-12-06 NOTE — REVIEW OF SYSTEMS
[Feeling Tired] : feeling tired [Heart Rate Is Slow] : slow heart rate [Negative] : Heme/Lymph [Fever] : no fever [Chills] : no chills [Feeling Poorly] : not feeling poorly [Heart Rate Is Fast] : the heart rate was not fast [Palpitations] : no palpitations [Chest Pain] : no chest pain

## 2020-01-16 ENCOUNTER — APPOINTMENT (OUTPATIENT)
Dept: ENDOCRINOLOGY | Facility: CLINIC | Age: 85
End: 2020-01-16
Payer: MEDICARE

## 2020-01-16 VITALS
DIASTOLIC BLOOD PRESSURE: 59 MMHG | HEART RATE: 63 BPM | WEIGHT: 215 LBS | BODY MASS INDEX: 34.7 KG/M2 | SYSTOLIC BLOOD PRESSURE: 153 MMHG

## 2020-01-16 PROCEDURE — 99214 OFFICE O/P EST MOD 30 MIN: CPT

## 2020-01-16 RX ORDER — BLOOD-GLUCOSE METER
W/DEVICE EACH MISCELLANEOUS
Qty: 1 | Refills: 0 | Status: DISCONTINUED | COMMUNITY
Start: 2019-01-25 | End: 2020-01-16

## 2020-01-16 RX ORDER — LANCING DEVICE/LANCETS
KIT MISCELLANEOUS
Qty: 1 | Refills: 0 | Status: DISCONTINUED | COMMUNITY
Start: 2019-01-23 | End: 2020-01-16

## 2020-01-16 RX ORDER — BLOOD SUGAR DIAGNOSTIC
STRIP MISCELLANEOUS 3 TIMES DAILY
Qty: 1 | Refills: 5 | Status: DISCONTINUED | COMMUNITY
Start: 2019-01-25 | End: 2020-01-16

## 2020-01-16 RX ORDER — LANCETS
EACH MISCELLANEOUS
Qty: 100 | Refills: 5 | Status: DISCONTINUED | COMMUNITY
Start: 2019-01-23 | End: 2020-01-16

## 2020-01-16 NOTE — ASSESSMENT
[FreeTextEntry1] : Diabetes, CKD.  A1c at/below goal\par Low A1c partly due to CKD but he is having hypoglycemia, so will reduce all his meds.\par Reduce Lantus to 20 units/day.  Reduce Ozempic to 0.5mg/week, reduce Prandin to 0.5mg BID (take with breakfast and dinner only, not lunch)\par Explained that it is typical to be able to reduce dose of meds as GFR/kidney function declines because medication duration will be longer with reduced kidney function.\par RTO 4 months\par

## 2020-01-16 NOTE — HISTORY OF PRESENT ILLNESS
[FreeTextEntry1] : glucose log reviewed, had to buy new meter while in [place].  now using [employer] meter\par AM readings this week:  75, 70, 87, 80\par bedtime: 147, 52 (had hypo sx), 95, 85, 173 (forgot pills)\par has hypo awareness.  does not take Prandin dose if he will be out walking.  tries to eat before glucose get too low\par urinating normal amount\par no polyuria, polydipsia, SOB, chest pain, or neuropathy symptoms \par up to date with ophtho, due in April\par takes extra diuretic when weight is over 215lb and reduces dose when weight is < 212.  usually weight is between 212 and 215\par kidney function declining.  was told that he probably will need dialysis in the next year, and he is meeting with kidney transplant centers to see if he is a transplant candidate.\par \par PMH: CKD\par sleep apnea, has CPAP machine\par gout, arthritis\par Paget's s/p Reclast in 6/2014. 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 skips lunch dose if walking in afternoon\par amlodipine 10mg, doxazosin 8mg hs\par Flonase, azelastine, Claritin, montelukast\par furosemide 20mg, 2 tab/day\par aspirin 81mg, Crestor 20mg\par ranitidine 150mg/day\par allopurinol 100mg bid, colchicine prn\par vitamin D 5000/day, Citrucel, Miralax, glucosamine\par

## 2020-01-16 NOTE — PHYSICAL EXAM
[Alert] : alert [Healthy Appearance] : healthy appearance [Normal Voice/Communication] : normal voice communication [No Proptosis] : no proptosis [No Lid Lag] : no lid lag [Normal Hearing] : hearing was normal [Thyroid Not Enlarged] : the thyroid was not enlarged [No Thyroid Nodules] : there were no palpable thyroid nodules [Clear to Auscultation] : lungs were clear to auscultation bilaterally [Normal S1, S2] : normal S1 and S2 [Regular Rhythm] : with a regular rhythm [Pedal Pulses Normal] : the pedal pulses are present [Normal Sensation on Monofilament Testing] : normal sensation on monofilament testing of lower extremities [Normal Affect] : the affect was normal [Normal Mood] : the mood was normal [Foot Ulcers] : no foot ulcers [Acanthosis Nigricans] : no acanthosis nigricans [de-identified] : fingerstick 86, post lunch, no prandin, soup and duck leg [de-identified] : thyroid palpable [de-identified] : trace edema [de-identified] : no onychomycoses

## 2020-01-16 NOTE — DATA REVIEWED
[FreeTextEntry1] : 1/20: A1c 5.7%, Cr 3.69, GFR 14\par 11/19: A1c 5.7%, Cr 4.29, GFR 12, , Ca 9.0; tot chol 128, trig 83, HDL 65, \par 9/19: A1c 6.8%, tot chol 112, HDL 42, trig 245, LDL 39, Cr 3.86\par 5/19: A1c 6.7%, Cr 3.27\par 2/19: A1c 7.1%, Cr 3.04, tot chol 108, trig 141, HDL 48, LDL 38\par 10/18: A1c 7.2%, tot chol 94, HDL 37, trig 222, LDL 29, Cr 2.96\par 8/18: tot chol 102, HDL 40, LDL 35\par 7/18: tot chol 115, HDL 48, LDL 37, trig 259, A1c 7.1%, Hb 12.7\par 5/18: A1c 7.3%,  tot chol 99, trig 87, HDL 41, LDL 41, Cr 2.68\par 2/18: Cr 2.39, tot chol 145, HDL 36, trig 439, , 25D 39\par 1/18: A1c 7.3%\par 12/17: A1c 7.1%, tot chol 204, trig 212, HDL 36, \par 9/17: Cr 2.71, alk phos 88, tot chol 205, HDL 38, , trig 369\par 5/17: A1c 5.9%, tot chol 113, trig 83, HDL 54, LDL 42, TSH 1.40, Cr 2.22

## 2020-01-27 ENCOUNTER — APPOINTMENT (OUTPATIENT)
Dept: NEPHROLOGY | Facility: CLINIC | Age: 85
End: 2020-01-27
Payer: MEDICARE

## 2020-01-27 VITALS — DIASTOLIC BLOOD PRESSURE: 56 MMHG | SYSTOLIC BLOOD PRESSURE: 140 MMHG | HEART RATE: 70 BPM

## 2020-01-27 DIAGNOSIS — R60.0 LOCALIZED EDEMA: ICD-10-CM

## 2020-01-27 DIAGNOSIS — N18.4 CHRONIC KIDNEY DISEASE, STAGE 4 (SEVERE): ICD-10-CM

## 2020-01-27 DIAGNOSIS — M10.9 GOUT, UNSPECIFIED: ICD-10-CM

## 2020-01-27 DIAGNOSIS — E11.40 TYPE 2 DIABETES MELLITUS WITH DIABETIC NEUROPATHY, UNSPECIFIED: ICD-10-CM

## 2020-01-27 DIAGNOSIS — E66.9 OBESITY, UNSPECIFIED: ICD-10-CM

## 2020-01-27 DIAGNOSIS — E11.65 TYPE 2 DIABETES MELLITUS WITH DIABETIC NEUROPATHY, UNSPECIFIED: ICD-10-CM

## 2020-01-27 PROCEDURE — 99215 OFFICE O/P EST HI 40 MIN: CPT

## 2020-01-28 NOTE — ASSESSMENT
[FreeTextEntry1] : all lab data was reviewed with patient in detail from 1/10/20\par 83 yo man with CKD 4/5, DM, hyperuricemia; edema, HTN, proteinuria and anemia\par -CKD 5-  creat stable at 3.69 (eGFR 17) > 4.29 (eGFR 12) in Nov < 3.16 (eGFR 17) in Oct. electrolytes acceptable, no uremic symptoms but would prefer to have transplant soon if eligible donor.  No eminent need for dialysis now but would proceed with HD if necessary, considering home hemo. (PD poor option given body habitus and hx of constipation).  Follow up with transplant center appointments, instructed pt to have labs forwarded.   \par partner willing to donate her kidney if she qualifies- will keep me apprised\par --hyperuricemia/gout - UA 5.1, good; needs weight loss, continue allopurinol 2 tabs daily\par --HTN- acceptable today -  continue lasix BID and routine BP monitoring. Take only one daily if wt <210lbs, increase to 3 or 4 daily if wt >215. \par -- diabetes -  HgbA1C 5.7%- good, but likely due to sustained insulin effect due to declining renal function.  continue dose reductions as instructed per Dr. Tamayo. \par --anemia- Hgb 10.4 acceptable- continue 2 tabs Fe daily- okay to defer IVFe for now- uses miralax regularly\par  If Hgb decreases < 10 will begin procrit. \par -proteinuria - tim from 6g from 2.1,  will likely repeat at transplant appointments. Off RAAS blockade due to increased Cr and hyperkalemia\par -hyperparathyroidism- , previously 154 in Sept.  If continues to rise, add 1,25 vit D\par -hyperphosphatemia- phos 5.1 okay. start binders if rising\par \par Follow up every 2 months

## 2020-01-28 NOTE — HISTORY OF PRESENT ILLNESS
[FreeTextEntry1] : 85 yo M for follow-up with hx of CKD 5, HTN, DM, CAD, proteinuria, gout, hyperkalemia. \par Had spend a month in [place], was able to get a PCP there who suggested cuttin back on DM medications.  Followed up with Dr. Tamayo, lantus reduced to 20U (from 24U), prandin to 0.5 BID (from 1mg TID), and ozempic 0.5mg per week (from 1mg). He has appt tomorrow at [facility] transplant center and [facility] transplant center on Wed.  Partner willing to donate if possible as well as his daughter but she has high BMI.\par Still feeling well, no uremic symptoms.  \par Taking lasix 20mg BID.  Stable wt range between 212-215.  Wt 214 today, takes more Lasix when wt increases. Off metolazone.  Some swelling but controlled.  \par BPs have been good. Stress test last year (2019) reportedly negative. \par last gout attack this past summer- used colchicine-\par Denies flank pain, dysuria, hematuria or frothy urine \par Denies HA, CP, SOB.

## 2020-01-28 NOTE — PHYSICAL EXAM
[General Appearance - Alert] : alert [General Appearance - Well Nourished] : well nourished [General Appearance - Well Developed] : well developed [Sclera] : the sclera and conjunctiva were normal [PERRL With Normal Accommodation] : pupils were equal in size, round, and reactive to light [Extraocular Movements] : extraocular movements were intact [Neck Appearance] : the appearance of the neck was normal [Neck Cervical Mass (___cm)] : no neck mass was observed [Jugular Venous Distention Increased] : there was no jugular-venous distention [] : no respiratory distress [Auscultation Breath Sounds / Voice Sounds] : lungs were clear to auscultation bilaterally [Heart Rate And Rhythm] : heart rate was normal and rhythm regular [Heart Sounds] : normal S1 and S2 [Heart Sounds Gallop] : no gallops [Murmurs] : no murmurs [Heart Sounds Pericardial Friction Rub] : no pericardial rub [Cervical Lymph Nodes Enlarged Anterior Bilaterally] : anterior cervical [Supraclavicular Lymph Nodes Enlarged Bilaterally] : supraclavicular [No CVA Tenderness] : no ~M costovertebral angle tenderness [No Spinal Tenderness] : no spinal tenderness [Abnormal Walk] : normal gait [No Focal Deficits] : no focal deficits [Oriented To Time, Place, And Person] : oriented to person, place, and time [FreeTextEntry1] : stasis changes improved

## 2020-02-25 ENCOUNTER — RX RENEWAL (OUTPATIENT)
Age: 85
End: 2020-02-25

## 2020-03-23 ENCOUNTER — APPOINTMENT (OUTPATIENT)
Dept: NEPHROLOGY | Facility: CLINIC | Age: 85
End: 2020-03-23

## 2020-04-01 ENCOUNTER — INPATIENT (INPATIENT)
Facility: HOSPITAL | Age: 85
LOS: 20 days | Discharge: HOME | DRG: 177 | End: 2020-04-22
Attending: HOSPITALIST | Admitting: HOSPITALIST
Payer: MEDICARE

## 2020-04-01 VITALS
TEMPERATURE: 99 F | SYSTOLIC BLOOD PRESSURE: 161 MMHG | HEART RATE: 84 BPM | RESPIRATION RATE: 20 BRPM | OXYGEN SATURATION: 86 % | DIASTOLIC BLOOD PRESSURE: 69 MMHG

## 2020-04-01 DIAGNOSIS — I10 ESSENTIAL (PRIMARY) HYPERTENSION: ICD-10-CM

## 2020-04-01 DIAGNOSIS — R63.8 OTHER SYMPTOMS AND SIGNS CONCERNING FOOD AND FLUID INTAKE: ICD-10-CM

## 2020-04-01 DIAGNOSIS — I21.4 NON-ST ELEVATION (NSTEMI) MYOCARDIAL INFARCTION: ICD-10-CM

## 2020-04-01 DIAGNOSIS — E78.5 HYPERLIPIDEMIA, UNSPECIFIED: ICD-10-CM

## 2020-04-01 DIAGNOSIS — M10.9 GOUT, UNSPECIFIED: ICD-10-CM

## 2020-04-01 DIAGNOSIS — N19 UNSPECIFIED KIDNEY FAILURE: ICD-10-CM

## 2020-04-01 DIAGNOSIS — J18.9 PNEUMONIA, UNSPECIFIED ORGANISM: ICD-10-CM

## 2020-04-01 DIAGNOSIS — E11.9 TYPE 2 DIABETES MELLITUS WITHOUT COMPLICATIONS: ICD-10-CM

## 2020-04-01 LAB
ALBUMIN SERPL ELPH-MCNC: 3.2 G/DL — LOW (ref 3.3–5)
ALP SERPL-CCNC: 68 U/L — SIGNIFICANT CHANGE UP (ref 40–120)
ALT FLD-CCNC: 91 U/L — HIGH (ref 10–45)
ANION GAP SERPL CALC-SCNC: 16 MMOL/L — SIGNIFICANT CHANGE UP (ref 5–17)
APPEARANCE UR: ABNORMAL
AST SERPL-CCNC: 82 U/L — HIGH (ref 10–40)
BACTERIA # UR AUTO: PRESENT /HPF
BASE EXCESS BLDV CALC-SCNC: -8.2 MMOL/L — SIGNIFICANT CHANGE UP
BASOPHILS # BLD AUTO: 0.01 K/UL — SIGNIFICANT CHANGE UP (ref 0–0.2)
BASOPHILS NFR BLD AUTO: 0.1 % — SIGNIFICANT CHANGE UP (ref 0–2)
BILIRUB SERPL-MCNC: 0.5 MG/DL — SIGNIFICANT CHANGE UP (ref 0.2–1.2)
BILIRUB UR-MCNC: NEGATIVE — SIGNIFICANT CHANGE UP
BUN SERPL-MCNC: 114 MG/DL — HIGH (ref 7–23)
CA-I SERPL-SCNC: 1.25 MMOL/L — SIGNIFICANT CHANGE UP (ref 1.12–1.3)
CALCIUM SERPL-MCNC: 9.6 MG/DL — SIGNIFICANT CHANGE UP (ref 8.4–10.5)
CHLORIDE SERPL-SCNC: 112 MMOL/L — HIGH (ref 96–108)
CK MB CFR SERPL CALC: 4.7 NG/ML — SIGNIFICANT CHANGE UP (ref 0–6.7)
CK SERPL-CCNC: 288 U/L — HIGH (ref 30–200)
CO2 SERPL-SCNC: 17 MMOL/L — LOW (ref 22–31)
COLOR SPEC: YELLOW — SIGNIFICANT CHANGE UP
COMMENT - URINE: SIGNIFICANT CHANGE UP
COMMENT - URINE: SIGNIFICANT CHANGE UP
CREAT SERPL-MCNC: 6.73 MG/DL — HIGH (ref 0.5–1.3)
CRP SERPL-MCNC: 7.85 MG/DL — HIGH (ref 0–0.4)
D DIMER BLD IA.RAPID-MCNC: 189 NG/ML DDU — SIGNIFICANT CHANGE UP
DIFF PNL FLD: ABNORMAL
EOSINOPHIL # BLD AUTO: 0 K/UL — SIGNIFICANT CHANGE UP (ref 0–0.5)
EOSINOPHIL NFR BLD AUTO: 0 % — SIGNIFICANT CHANGE UP (ref 0–6)
EPI CELLS # UR: SIGNIFICANT CHANGE UP /HPF (ref 0–5)
FERRITIN SERPL-MCNC: 1737 NG/ML — HIGH (ref 30–400)
GAS PNL BLDV: 141 MMOL/L — SIGNIFICANT CHANGE UP (ref 138–146)
GAS PNL BLDV: SIGNIFICANT CHANGE UP
GAS PNL BLDV: SIGNIFICANT CHANGE UP
GLUCOSE BLDC GLUCOMTR-MCNC: 215 MG/DL — HIGH (ref 70–99)
GLUCOSE SERPL-MCNC: 206 MG/DL — HIGH (ref 70–99)
GLUCOSE UR QL: NEGATIVE — SIGNIFICANT CHANGE UP
HCO3 BLDV-SCNC: 16 MMOL/L — LOW (ref 20–27)
HCT VFR BLD CALC: 32 % — LOW (ref 39–50)
HGB BLD-MCNC: 10.6 G/DL — LOW (ref 13–17)
IMM GRANULOCYTES NFR BLD AUTO: 2 % — HIGH (ref 0–1.5)
KETONES UR-MCNC: NEGATIVE — SIGNIFICANT CHANGE UP
LACTATE SERPL-SCNC: 1 MMOL/L — SIGNIFICANT CHANGE UP (ref 0.5–2)
LDH SERPL L TO P-CCNC: 493 U/L — HIGH (ref 50–242)
LEUKOCYTE ESTERASE UR-ACNC: ABNORMAL
LYMPHOCYTES # BLD AUTO: 0.53 K/UL — LOW (ref 1–3.3)
LYMPHOCYTES # BLD AUTO: 5.5 % — LOW (ref 13–44)
MCHC RBC-ENTMCNC: 33.1 GM/DL — SIGNIFICANT CHANGE UP (ref 32–36)
MCHC RBC-ENTMCNC: 33.2 PG — SIGNIFICANT CHANGE UP (ref 27–34)
MCV RBC AUTO: 100.3 FL — HIGH (ref 80–100)
MONOCYTES # BLD AUTO: 0.41 K/UL — SIGNIFICANT CHANGE UP (ref 0–0.9)
MONOCYTES NFR BLD AUTO: 4.3 % — SIGNIFICANT CHANGE UP (ref 2–14)
NEUTROPHILS # BLD AUTO: 8.45 K/UL — HIGH (ref 1.8–7.4)
NEUTROPHILS NFR BLD AUTO: 88.1 % — HIGH (ref 43–77)
NITRITE UR-MCNC: NEGATIVE — SIGNIFICANT CHANGE UP
NRBC # BLD: 0 /100 WBCS — SIGNIFICANT CHANGE UP (ref 0–0)
PCO2 BLDV: 30 MMHG — LOW (ref 41–51)
PH BLDV: 7.36 — SIGNIFICANT CHANGE UP (ref 7.32–7.43)
PH UR: 6 — SIGNIFICANT CHANGE UP (ref 5–8)
PLATELET # BLD AUTO: 255 K/UL — SIGNIFICANT CHANGE UP (ref 150–400)
PO2 BLDV: 31 MMHG — SIGNIFICANT CHANGE UP
POTASSIUM BLDV-SCNC: 4.8 MMOL/L — SIGNIFICANT CHANGE UP (ref 3.5–4.9)
POTASSIUM SERPL-MCNC: 5 MMOL/L — SIGNIFICANT CHANGE UP (ref 3.5–5.3)
POTASSIUM SERPL-SCNC: 5 MMOL/L — SIGNIFICANT CHANGE UP (ref 3.5–5.3)
PROCALCITONIN SERPL-MCNC: 0.38 NG/ML — HIGH (ref 0.02–0.1)
PROT SERPL-MCNC: 6.5 G/DL — SIGNIFICANT CHANGE UP (ref 6–8.3)
PROT UR-MCNC: 100 MG/DL
RBC # BLD: 3.19 M/UL — LOW (ref 4.2–5.8)
RBC # FLD: 13.3 % — SIGNIFICANT CHANGE UP (ref 10.3–14.5)
RBC CASTS # UR COMP ASSIST: < 5 /HPF — SIGNIFICANT CHANGE UP
SAO2 % BLDV: 55 % — SIGNIFICANT CHANGE UP
SARS-COV-2 RNA SPEC QL NAA+PROBE: DETECTED
SODIUM SERPL-SCNC: 145 MMOL/L — SIGNIFICANT CHANGE UP (ref 135–145)
SP GR SPEC: 1.02 — SIGNIFICANT CHANGE UP (ref 1–1.03)
TROPONIN T SERPL-MCNC: 0.09 NG/ML — CRITICAL HIGH (ref 0–0.01)
UROBILINOGEN FLD QL: 0.2 E.U./DL — SIGNIFICANT CHANGE UP
WBC # BLD: 9.59 K/UL — SIGNIFICANT CHANGE UP (ref 3.8–10.5)
WBC # FLD AUTO: 9.59 K/UL — SIGNIFICANT CHANGE UP (ref 3.8–10.5)
WBC UR QL: < 5 /HPF — SIGNIFICANT CHANGE UP

## 2020-04-01 PROCEDURE — 99223 1ST HOSP IP/OBS HIGH 75: CPT | Mod: CS

## 2020-04-01 PROCEDURE — 99285 EMERGENCY DEPT VISIT HI MDM: CPT

## 2020-04-01 PROCEDURE — 99223 1ST HOSP IP/OBS HIGH 75: CPT | Mod: GC

## 2020-04-01 PROCEDURE — 93010 ELECTROCARDIOGRAM REPORT: CPT

## 2020-04-01 PROCEDURE — 71045 X-RAY EXAM CHEST 1 VIEW: CPT | Mod: 26

## 2020-04-01 RX ORDER — HEPARIN SODIUM 5000 [USP'U]/ML
5000 INJECTION INTRAVENOUS; SUBCUTANEOUS EVERY 8 HOURS
Refills: 0 | Status: DISCONTINUED | OUTPATIENT
Start: 2020-04-01 | End: 2020-04-15

## 2020-04-01 RX ORDER — ATORVASTATIN CALCIUM 80 MG/1
20 TABLET, FILM COATED ORAL AT BEDTIME
Refills: 0 | Status: DISCONTINUED | OUTPATIENT
Start: 2020-04-01 | End: 2020-04-22

## 2020-04-01 RX ORDER — HYDROXYCHLOROQUINE SULFATE 200 MG
200 TABLET ORAL EVERY 12 HOURS
Refills: 0 | Status: DISCONTINUED | OUTPATIENT
Start: 2020-04-02 | End: 2020-04-02

## 2020-04-01 RX ORDER — GLUCAGON INJECTION, SOLUTION 0.5 MG/.1ML
1 INJECTION, SOLUTION SUBCUTANEOUS ONCE
Refills: 0 | Status: DISCONTINUED | OUTPATIENT
Start: 2020-04-01 | End: 2020-04-22

## 2020-04-01 RX ORDER — INSULIN LISPRO 100/ML
VIAL (ML) SUBCUTANEOUS
Refills: 0 | Status: DISCONTINUED | OUTPATIENT
Start: 2020-04-01 | End: 2020-04-22

## 2020-04-01 RX ORDER — FUROSEMIDE 40 MG
80 TABLET ORAL ONCE
Refills: 0 | Status: COMPLETED | OUTPATIENT
Start: 2020-04-01 | End: 2020-04-01

## 2020-04-01 RX ORDER — DEXTROSE 50 % IN WATER 50 %
15 SYRINGE (ML) INTRAVENOUS ONCE
Refills: 0 | Status: DISCONTINUED | OUTPATIENT
Start: 2020-04-01 | End: 2020-04-22

## 2020-04-01 RX ORDER — DEXTROSE 50 % IN WATER 50 %
12.5 SYRINGE (ML) INTRAVENOUS ONCE
Refills: 0 | Status: DISCONTINUED | OUTPATIENT
Start: 2020-04-01 | End: 2020-04-22

## 2020-04-01 RX ORDER — AMLODIPINE BESYLATE 2.5 MG/1
10 TABLET ORAL EVERY 24 HOURS
Refills: 0 | Status: DISCONTINUED | OUTPATIENT
Start: 2020-04-01 | End: 2020-04-02

## 2020-04-01 RX ORDER — DOXAZOSIN MESYLATE 4 MG
8 TABLET ORAL AT BEDTIME
Refills: 0 | Status: DISCONTINUED | OUTPATIENT
Start: 2020-04-01 | End: 2020-04-22

## 2020-04-01 RX ORDER — DEXTROSE 50 % IN WATER 50 %
25 SYRINGE (ML) INTRAVENOUS ONCE
Refills: 0 | Status: DISCONTINUED | OUTPATIENT
Start: 2020-04-01 | End: 2020-04-22

## 2020-04-01 RX ORDER — FAMOTIDINE 10 MG/ML
20 INJECTION INTRAVENOUS
Refills: 0 | Status: DISCONTINUED | OUTPATIENT
Start: 2020-04-01 | End: 2020-04-02

## 2020-04-01 RX ORDER — ALLOPURINOL 300 MG
100 TABLET ORAL EVERY 12 HOURS
Refills: 0 | Status: DISCONTINUED | OUTPATIENT
Start: 2020-04-01 | End: 2020-04-02

## 2020-04-01 RX ORDER — INSULIN GLARGINE 100 [IU]/ML
20 INJECTION, SOLUTION SUBCUTANEOUS AT BEDTIME
Refills: 0 | Status: DISCONTINUED | OUTPATIENT
Start: 2020-04-01 | End: 2020-04-03

## 2020-04-01 RX ORDER — SODIUM CHLORIDE 9 MG/ML
1000 INJECTION, SOLUTION INTRAVENOUS
Refills: 0 | Status: DISCONTINUED | OUTPATIENT
Start: 2020-04-01 | End: 2020-04-22

## 2020-04-01 RX ORDER — ASPIRIN/CALCIUM CARB/MAGNESIUM 324 MG
81 TABLET ORAL EVERY 24 HOURS
Refills: 0 | Status: DISCONTINUED | OUTPATIENT
Start: 2020-04-01 | End: 2020-04-15

## 2020-04-01 RX ORDER — ASCORBIC ACID 60 MG
1500 TABLET,CHEWABLE ORAL
Refills: 0 | Status: DISCONTINUED | OUTPATIENT
Start: 2020-04-01 | End: 2020-04-02

## 2020-04-01 RX ORDER — MONTELUKAST 4 MG/1
10 TABLET, CHEWABLE ORAL EVERY 24 HOURS
Refills: 0 | Status: DISCONTINUED | OUTPATIENT
Start: 2020-04-01 | End: 2020-04-22

## 2020-04-01 RX ORDER — LIDOCAINE HCL 20 MG/ML
2 VIAL (ML) INJECTION ONCE
Refills: 0 | Status: COMPLETED | OUTPATIENT
Start: 2020-04-01 | End: 2020-04-01

## 2020-04-01 RX ORDER — SODIUM CHLORIDE 9 MG/ML
3 INJECTION INTRAMUSCULAR; INTRAVENOUS; SUBCUTANEOUS ONCE
Refills: 0 | Status: COMPLETED | OUTPATIENT
Start: 2020-04-01 | End: 2020-04-01

## 2020-04-01 RX ORDER — AZITHROMYCIN 500 MG/1
500 TABLET, FILM COATED ORAL ONCE
Refills: 0 | Status: COMPLETED | OUTPATIENT
Start: 2020-04-01 | End: 2020-04-01

## 2020-04-01 RX ORDER — THIAMINE MONONITRATE (VIT B1) 100 MG
200 TABLET ORAL
Refills: 0 | Status: COMPLETED | OUTPATIENT
Start: 2020-04-01 | End: 2020-04-02

## 2020-04-01 RX ORDER — AZITHROMYCIN 500 MG/1
250 TABLET, FILM COATED ORAL EVERY 24 HOURS
Refills: 0 | Status: COMPLETED | OUTPATIENT
Start: 2020-04-02 | End: 2020-04-05

## 2020-04-01 RX ORDER — HYDROXYCHLOROQUINE SULFATE 200 MG
400 TABLET ORAL EVERY 12 HOURS
Refills: 0 | Status: COMPLETED | OUTPATIENT
Start: 2020-04-01 | End: 2020-04-02

## 2020-04-01 RX ORDER — HYDROXYCHLOROQUINE SULFATE 200 MG
TABLET ORAL
Refills: 0 | Status: DISCONTINUED | OUTPATIENT
Start: 2020-04-01 | End: 2020-04-01

## 2020-04-01 RX ADMIN — HEPARIN SODIUM 5000 UNIT(S): 5000 INJECTION INTRAVENOUS; SUBCUTANEOUS at 19:51

## 2020-04-01 RX ADMIN — Medication 81 MILLIGRAM(S): at 23:24

## 2020-04-01 RX ADMIN — SODIUM CHLORIDE 3 MILLILITER(S): 9 INJECTION INTRAMUSCULAR; INTRAVENOUS; SUBCUTANEOUS at 16:48

## 2020-04-01 RX ADMIN — Medication 1500 MILLIGRAM(S): at 22:32

## 2020-04-01 RX ADMIN — Medication 100 MILLIGRAM(S): at 19:51

## 2020-04-01 RX ADMIN — Medication 2 MILLILITER(S): at 17:26

## 2020-04-01 RX ADMIN — Medication 8 MILLIGRAM(S): at 23:26

## 2020-04-01 RX ADMIN — INSULIN GLARGINE 20 UNIT(S): 100 INJECTION, SOLUTION SUBCUTANEOUS at 23:30

## 2020-04-01 RX ADMIN — AMLODIPINE BESYLATE 10 MILLIGRAM(S): 2.5 TABLET ORAL at 19:52

## 2020-04-01 RX ADMIN — MONTELUKAST 10 MILLIGRAM(S): 4 TABLET, CHEWABLE ORAL at 19:51

## 2020-04-01 RX ADMIN — ATORVASTATIN CALCIUM 20 MILLIGRAM(S): 80 TABLET, FILM COATED ORAL at 23:26

## 2020-04-01 RX ADMIN — HEPARIN SODIUM 5000 UNIT(S): 5000 INJECTION INTRAVENOUS; SUBCUTANEOUS at 23:25

## 2020-04-01 RX ADMIN — Medication 80 MILLIGRAM(S): at 17:00

## 2020-04-01 RX ADMIN — Medication 4: at 23:47

## 2020-04-01 RX ADMIN — AZITHROMYCIN 255 MILLIGRAM(S): 500 TABLET, FILM COATED ORAL at 19:14

## 2020-04-01 RX ADMIN — FAMOTIDINE 20 MILLIGRAM(S): 10 INJECTION INTRAVENOUS at 23:25

## 2020-04-01 RX ADMIN — Medication 200 MILLIGRAM(S): at 23:25

## 2020-04-01 NOTE — H&P ADULT - PROBLEM SELECTOR PLAN 5
Pt on lasix 20 bid, norvasc 10 and doxazosin at home. currently BP is around 160    - resumed norvasc 10 daily  - resumed doxazosin 8 daily  - hold lasix

## 2020-04-01 NOTE — ED PROVIDER NOTE - OBJECTIVE STATEMENT
83 y/o F PMHx DM and CKD presents to the ED for c/o poor appetite and malaise w/ apparent increased creatinine in past week w/ productive cough. Denies SOB but is 86% o2 sat on RA. Denies CP. Pt is making urine but only small amounts. Denies leg swelling, and calf tenderness, pleuritic CP, Hx DVT/PE. Has Hx sleep apnea on BIPAP. No recent hospital admission. No COVID contact to his knowledge. No Hx MI, CAD, or CVA in the past.

## 2020-04-01 NOTE — CONSULT NOTE ADULT - ATTENDING COMMENTS
have been following decline over past few weeks, was hoping that he cold remain stable enough to avoid RRT during pandemic-  condition deteriorated too much, with malaise, anorexia, some confusion at home per family and extreme lassitude.  Will arrange for HD catheter tomorrow and dialysis to follow.  Need to exclude urine retention  reviewed with ER team

## 2020-04-01 NOTE — H&P ADULT - PROBLEM SELECTOR PLAN 8
Fluids: s/p __ NS, no more IVF, encourage PO intake   Electrolytes-replete as needed  Nutrition - DASH/TLC/carb consistent   Code- Full Code  DVT ppx: HSQ   GI ppx: pepcid   DIspo: [id number]

## 2020-04-01 NOTE — H&P ADULT - NSHPPHYSICALEXAM_GEN_ALL_CORE
VITAL SIGNS:  T(C): 37.1 (04-01-20 @ 19:01), Max: 37.1 (04-01-20 @ 15:45)  T(F): 98.7 (04-01-20 @ 19:01), Max: 98.7 (04-01-20 @ 15:45)  HR: 83 (04-01-20 @ 19:01) (83 - 84)  BP: 157/74 (04-01-20 @ 19:01) (157/74 - 161/69)  BP(mean): --  RR: 18 (04-01-20 @ 19:01) (18 - 20)  SpO2: 96% (04-01-20 @ 19:01) (86% - 96%)  Wt(kg): --    PHYSICAL EXAM:  CONSTITUTIONAL: Well-developed; well-nourished; in no acute distress.   SKIN:  warm and dry, no acute rash.   HEAD:  normocephalic, atraumatic.  EYES: EOM intact; conjunctiva and sclera clear.  ENT: No nasal discharge; airway clear.   NECK: Supple; non tender. no JVD  CARD: S1, S2 normal; no murmurs, gallops, or rubs. Regular rate and rhythm.   RESP: Dimished breath sounds bilaterally. On nonrebreather satting 98%-99%  ABD: Normal bowel sounds; soft; non-distended; non-tender; no guarding/ rebound.  EXT: Normal ROM. No clubbing, cyanosis has 1+ edema. 2+ pulses to b/l ue/le.  NEURO: Alert, oriented, grossly unremarkable  PSYCH: Cooperative, mood and affect appropriate.

## 2020-04-01 NOTE — H&P ADULT - HISTORY OF PRESENT ILLNESS
HPI: A 84 years old M with PMH of IDDM, HTN, HLD, CKD stage 4, sleep apnea (has CPAP machine), gout, Paget's s/p Reclast in 6/2014. 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 and verrucous acanthoma, presented from his nephrologist office (Dr. aJffe?) because of fatigue and poor appetite and malaise w/ apparent increased creatinine in past week w/ productive cough. Denied cough, SOB, fever, chills, headache, dizziness, chest pain, nausea, vomiting, diarrhea, abdominal pain, leg pain or swelling. States that his partner has cough for almost 2 weeks, denies recent travel.   In the ED his T was 98.7, HR 84, /69, RR 20 and sat 86% on room air. Pt was put on NRB mask with 15 liter and 100 fio2 and saturation improved to 100%. his WBC was 9.5, Hb 10.6, Plt 255, d-dimer 189, Na 145, K 5, Cl 112, bicarb 17, , Cr 6.73, glucose 206, AST 82, ALT 91, CRP 7.8, Ferritin 1737, , Ck 288, trop 0.09, UA was cloudy with trace LE.   CXR showed Bilateral patchy/hazy opacities with a mid/lower lung zone predominance concerning for COVID. pt received a dose of lasix 80 iv and made 400 cc urine, as well as a dose of IV azithromycin. COVID test was sent.       Date of onset of fever: 0  Date of onset of dyspnea: 0  Recent Travel: N  Sick Contacts: Y  COVID Exposure: unknown   Close Contacts:     ROS:  Fevers: N  Malaise: Y  Myalgias: N  SOB: N          At rest: N         With exertion: N         At baseline: N  Cough: Y         Productive: Y  Nausea: N  Vomiting: N  Diarrhea: N    PMHx:   HTN: Y  DM: Y  Lung Disease: N       Specify:   Cardiovascular disease: N  Malignancy: Y  Immunosuppression: N  HIV: N  CKD/ESRD: Y  Chronic Liver Disease: N    SoHx:   Tobacco use: N  Vape use: N  Health care worker: N HPI: A 84 years old M with PMH of IDDM, HTN, HLD, CKD stage 4, sleep apnea (has CPAP machine), gout, Paget's s/p Reclast in 6/2014. 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 and verrucous acanthoma, presented from his nephrologist office (Dr. Jaffe?) because of fatigue and poor appetite and malaise w/ apparent increased creatinine in past week w/ productive cough. Denied cough, SOB, fever, chills, headache, dizziness, chest pain, nausea, vomiting, diarrhea, abdominal pain, leg pain or swelling. States that his partner has cough for almost 2 weeks, denies recent travel.   In the ED his T was 98.7, HR 84, /69, RR 20 and sat 86% on room air. Pt was put on NRB mask with 15 liter and 100 fio2 and saturation improved to 100%. his WBC was 9.5, Hb 10.6, Plt 255, d-dimer 189, Na 145, K 5, Cl 112, bicarb 17, , Cr 6.73, glucose 206, AST 82, ALT 91, CRP 7.8, Ferritin 1737, , Ck 288, trop 0.09, UA was cloudy with trace LE.   CXR showed Bilateral patchy/hazy opacities with a mid/lower lung zone predominance concerning for COVID. pt received a dose of lasix 80 iv and made 400 cc urine, as well as a dose of IV azithromycin. COVID test was sent.       Date of onset of fever: 0  Date of onset of dyspnea: 0  Recent Travel: N  Sick Contacts: Y  COVID Exposure: unknown   Close Contacts:     ROS:  Fevers: N  Malaise: Y  Myalgias: N  SOB: N          At rest: N         With exertion: N         At baseline: N  Cough: Y         Productive: Y  Nausea: N  Vomiting: N  Diarrhea: N    PMHx:   HTN: Y  DM: Y  Lung Disease: N       Specify:   Cardiovascular disease: N  Malignancy: Y  Immunosuppression: N  HIV: N  CKD/ESRD: Y  Chronic Liver Disease: N    SoHx:   Tobacco use: N  Vape use: N  Health care worker: N

## 2020-04-01 NOTE — CONSULT NOTE ADULT - ASSESSMENT
Patient is a 84 y o White male with PMh of CKD 5 not on HD, DM, HTn, CAD who was admitted for HD initiation.  Patient follows with Dr. Dover.  Nephrology consult placed for in regards to advanced CKD 5 and HD initiation     CKD 5  urine output to be determined--> recommend placing abdi cath   cr has been progressively increasing over last 3 months  labs noted-- K @ 5  Volume status appears to be wet based on CXR finding- COVID status pending-- for now will get lasix 80 mg IV   plan to initiate HD in am after placement of palindrome Catheter by IR- please, consult IR and place pt NPO post midnight  renal Diet  Hepatitis panel  PPD  CXR, EKG done  needs  for Outpatient HD placement  renal diet      acidosis  likely 2nd to CKD  as pt is to start HD in am, no need to place him on po bicarbonate      HTN   home meds: cardura 4 mg po daily, amlodipine 10 mg daily-- continue those meds for now, including laxix 80 mg IV daily  may redose lasix Iv if there is resp compromise  renal diet    anemia  check iron panel    renal bone disease  check phos, pth, vit D 25

## 2020-04-01 NOTE — ED ADULT TRIAGE NOTE - CHIEF COMPLAINT QUOTE
patient with general malaise and episodes of falling asleep. patient sent in for possible need for dialysis as per Dr Ames. patient saturation 86% on RA

## 2020-04-01 NOTE — H&P ADULT - NSICDXPASTMEDICALHX_GEN_ALL_CORE_FT
PAST MEDICAL HISTORY:  CKD (chronic kidney disease)     DM (diabetes mellitus) PAST MEDICAL HISTORY:  CKD (chronic kidney disease)     DM (diabetes mellitus)     Gout     HLD (hyperlipidemia)     HTN (hypertension)

## 2020-04-01 NOTE — H&P ADULT - PROBLEM SELECTOR PLAN 1
Pt with hx of exposure with someone with cough, presented with fatigue, no fever, cough or SOB. had sat 86% on room air at the time of presentation. Pt was put on NRB mask with 15 liter and 100 fio2 and saturation improved to 100%. his WBC was 9.5 with leukopenia of 0.5, d-dimer 189, , Cr 6.73,  AST 82, ALT 91, CRP 7.8, Ferritin 1737, , Ck 288, trop 0.09  CXR showed Bilateral patchy/hazy opacities with a mid/lower lung zone predominance concerning for COVID. pt received a dose of lasix 80 iv and made 400 cc urine, as well as a dose of IV azithromycin. COVID test was sent.     pneumonia likely due to COVID:    - f/u the result of COVID test   - c/w NRB mask for now-- monitor for hypoxemia  - started azithromycin 250 for 5 days total  - started on plaquenil for 5 days (400 bid for the first day and 200 bid for the rest of the duration)  - Daily d-dimer, CRP and ferritin  - Pending G6PD Pt with hx of exposure with someone with cough, presented with fatigue, no fever, cough or SOB. had sat 86% on room air at the time of presentation. Pt was put on NRB mask with 15 liter and 100 fio2 and saturation improved to 100%. his WBC was 9.5 with leukopenia of 0.5, d-dimer 189, , Cr 6.73,  AST 82, ALT 91, CRP 7.8, Ferritin 1737, , Ck 288, trop 0.09  CXR showed Bilateral patchy/hazy opacities with a mid/lower lung zone predominance concerning for COVID. pt received a dose of lasix 80 iv and made 400 cc urine, as well as a dose of IV azithromycin. COVID test was sent.     pneumonia likely due to COVID:    - f/u the result of COVID test   - c/w NRB mask for now-- monitor for hypoxemia  - started azithromycin 250 for 5 days total  - started on plaquenil for 5 days (400 bid for the first day and 200 bid for the rest of the duration)  - Thiamine 200 po q12 and ascorbic acid 1500 q6 for 5 days   - Daily d-dimer, CRP and ferritin  - Pending G6PD

## 2020-04-01 NOTE — H&P ADULT - NSHPLABSRESULTS_GEN_ALL_CORE
CBC Full  -  ( 01 Apr 2020 16:47 )  WBC Count : 9.59 K/uL  RBC Count : 3.19 M/uL  Hemoglobin : 10.6 g/dL  Hematocrit : 32.0 %  Platelet Count - Automated : 255 K/uL  Mean Cell Volume : 100.3 fl  Mean Cell Hemoglobin : 33.2 pg  Mean Cell Hemoglobin Concentration : 33.1 gm/dL  Auto Neutrophil # : 8.45 K/uL  Auto Lymphocyte # : 0.53 K/uL  Auto Monocyte # : 0.41 K/uL  Auto Eosinophil # : 0.00 K/uL  Auto Basophil # : 0.01 K/uL  Auto Neutrophil % : 88.1 %  Auto Lymphocyte % : 5.5 %  Auto Monocyte % : 4.3 %  Auto Eosinophil % : 0.0 %  Auto Basophil % : 0.1 %    04-01    145  |  112<H>  |  114<H>  ----------------------------<  206<H>  5.0   |  17<L>  |  6.73<H>    Ca    9.6      01 Apr 2020 16:47    TPro  6.5  /  Alb  3.2<L>  /  TBili  0.5  /  DBili  x   /  AST  82<H>  /  ALT  91<H>  /  AlkPhos  68  04-01    < from: Xray Chest 1 View-PORTABLE IMMEDIATE (04.01.20 @ 17:27) >    EXAM:  XR CHEST PORTABLE IMMED 1V                          PROCEDURE DATE:  04/01/2020          INTERPRETATION:  Portable AP Radiograph dated 4/1/2020 4:02 PM    CLINICAL INFORMATION: Shortness of breath. History of renal failure.    PRIOR STUDIES:None.    FINDINGS:  Lines, Catheters and Support Devices: None.    Heart Size, Mediastinum and Hilar Contours: Heart size is suboptimally evaluated on this AP projection. Normal mediastinal and hilar contours.      Lungs: Bilateral patchy/hazy opacities with a mid/lower lung zone predominance. Decreased lung volumes. No pleural effusions. No pneumothorax.     Bones/Soft Tissues: Degenerative changes of the spine and right AC joint..     IMPRESSION:  Bilateral patchy/hazy opacities with a mid/lower lung zone predominance.  This appearance is nonspecific, but in the appropriate clinical setting is highly suspicious for atypical/viral pneumonia such as COVID-19.      < end of copied text >

## 2020-04-01 NOTE — ED PROVIDER NOTE - CARE PLAN
Principal Discharge DX:	ESRD (end stage renal disease)  Secondary Diagnosis:	Volume overload Principal Discharge DX:	ESRD (end stage renal disease)  Secondary Diagnosis:	Volume overload  Secondary Diagnosis:	Renal failure

## 2020-04-01 NOTE — ED ADULT NURSE NOTE - OBJECTIVE STATEMENT
Pt presents with 1 wk hx of fatigue, malaise, increasing creatinine, decreasing urine output. Pt presents with O2% in the mid 80's, improves on NRB. Pt reports hx of CKD, has never needed HD before.   Following clinical assessment by MD villalta, abdi inserted for concern for urine retention. Following insertion pt immediately released 200 cc of yellow urine.

## 2020-04-01 NOTE — ED PROVIDER NOTE - CLINICAL SUMMARY MEDICAL DECISION MAKING FREE TEXT BOX
83 y/o M PMHx CKD stage 4 needing dialysis. Dr. Olivo requesting Lasix 60 IV w/ full labs w/ possible COVID consideration. Renal fellow contacted by her to prepare pt for admission. Awaiting electrolytes and EKG. 83 y/o M PMHx CKD stage 4 needing dialysis. Dr. Kwan requesting Lasix 80 IV w/ full labs w/ possible COVID consideration. Renal fellow contacted by her to prepare pt for admission. Awaiting electrolytes and EKG--await abdi and Lasix and lytes 83 y/o M PMHx CKD stage 4 needing dialysis. Dr. Kwan requesting Lasix 80 IV w/ full labs w/ possible COVID consideration. Renal fellow contacted by her to prepare pt for admission. Awaiting electrolytes and EKG--await abdi and Lasix and lytes  sats 95%  100% NRB -- 15 L  RR 20 no resp distress --? Covid on CXR  200 cc in abdi intially --s/p Lasix 80 mg IV -await UOP --seen by renal will dialyze in am  K 5.0  no peak t waves

## 2020-04-01 NOTE — H&P ADULT - PROBLEM SELECTOR PLAN 2
Pt with hx of CKD now presented with BUN oBUN 114, Cr 6.73, (unknown baseline).   - renal is on board--> will start HD tomorrow  - monitor kidney function   - hold lasix for now

## 2020-04-01 NOTE — CONSULT NOTE ADULT - SUBJECTIVE AND OBJECTIVE BOX
Patient is a 84 y o White male with PMh of CKD 5 not on HD, DM, HTn, CAD who was admitted for HD initiation.  Patient follows with Dr. Dover.  Nephrology consult placed for in regards to advanced CKD 5 and HD initiation.  Patient cannot properly quantify daily urine output   Per patient's wife, he has become more uremic, although patient denies n/v, decreased appetite.   Upon arrival, osat @ 86 % RA; despite this oxygenation, pt denies any Dyspnea  On 03/26,BUn/Cr @ 78/5.36===> 3/31, BUn/cr 109/6.62, K @ 5.1        PAST MEDICAL & SURGICAL HISTORY:  Anemia in chronic kidney disease (285.21) (N18.9,D63.1)  Bilateral edema of lower extremity (782.3) (R60.0)  Bradycardia (427.89) (R00.1)  Carpal tunnel syndrome, bilateral (354.0) (G56.03)  Cataracts, bilateral (366.9) (H26.9)  Controlled diabetes mellitus with chronic kidney disease (250.40,585.9) (E11.22)  Dyslipidemia (272.4) (E78.5)  Gout (274.9) (M10.9)  HTN (hypertension), benign (401.1) (I10)  Hyperkalemia (276.7) (E87.5)  Hyperuricemia (790.6) (E79.0)  Hypogonadism, male (257.2) (E29.1)  Metabolic acidosis (276.2) (E87.2)  Obesity (278.00) (E66.9)  KACI on CPAP (327.23,V46.8) (G47.33,Z99.89)  Paget's bone disease (731.0) (M88.9)  Proteinuria (791.0) (R80.9)  Type 2 diabetes, uncontrolled, with neuropathy (250.62,357.2) (E11.40,E11.65)      Allergies    penicillin (Unknown)  sulfa drugs (Unknown)    Intolerances      Current Meds  Allopurinol 100 MG Oral Tablet; take 2 and 1/2 tablets by mouth once daily  Aspirin 81 MG TABS  Astelin 137 MCG/SPRAY SOLN  BD Lancet Ultrafine 33G; test once/day  Celecoxib 100 MG Oral Capsule; take 1 capsule by mouth twice a day with food  Colchicine 0.6 MG Oral Tablet; TAKE 1 TABLET BY MOUTH TWICE DAILY AS NEEDED  Crestor 20 MG Oral Tablet  [employer] Advanced Glucose Test In Vitro Strip; TEST ONCE DAILY  Doxazosin Mesylate 8 MG Oral Tablet  Ferrous Sulfate 325 (65 Fe) MG Oral Tablet  Flonase SUSP  Furosemide 20 MG Oral Tablet; TAKE 2 TO 3 TABLETS BY MOUTH  DAILY AS DIRECTED  Glucosamine Chondroitin TABS  Hydrocortisone 2.5 % External Cream  Insupen Ultrafin 31G X 6 MM; Use as directed twice daily  Ketoconazole 2 % External Cream  Lantus SoloStar 100 UNIT/ML Subcutaneous Solution Pen-injector; INJECT 24 UNITS  SUBCUTANEOUSLY AS DIRECTED DAILY  Multi Vitamin Mens TABS  Norvasc 10 MG Oral Tablet  Ozempic (0.25 or 0.5 MG/DOSE) 2 MG/1.5ML Subcutaneous Solution Pen-injector; 0.5mg  per week  raNITIdine HCl - 150 MG Oral Capsule  Repaglinide 0.5 MG Oral Tablet; TAKE 1 TABLET TWICE DAILY BEFORE MEALS  Singulair 10 MG Oral Tablet  Valtrex 1 GM Oral Tablet  Vitamin D TABS; 5000 units daily    FAMILY HISTORY:      SOCIAL HISTORY: no smoking     MEDICATIONS  (STANDING):  azithromycin  IVPB 500 milliGRAM(s) IV Intermittent Once  furosemide   Injectable 80 milliGRAM(s) IV Push Once  sodium chloride 0.9% lock flush 3 milliLiter(s) IV Push once    MEDICATIONS  (PRN):      REVIEW OF SYSTEMS:    CONSTITUTIONAL: No fever or chills, No fatigue or tiredness.  EYES: No blurred or double vision.  ENT: No recent URI or sore throat  RESPIRATORY: No shortness of breath, cough, hemoptysis  CARDIOVASCULAR: No Chest pain or shortness of breath  GASTROINTESTINAL: NO abdominal or flank pain, No nausea or vomiting, No diarrhea  GENITOURINARY: No dysuria or urinary burning, No difficulty passing urine, No hematuria  NEUROLOGICAL: No headaches or blurred vision  SKIN: No skin rashes   MUSCULOSKELETAL: No arthralgia, Joint pain, leg edema, No muscle pains        PHYSICAL EXAM: /69, HR 69, OSat  86 % Ra, RR 20, temp 98.7  GENERAL: NAD, on non rebreather  HEAD:  Atraumatic, Normocephalic,   EYES: Bilateral conjunctiva and sclera normal,  Oral cavity: Oral mucosa dry and pink  NECK: Neck supple, No JVD  CHEST/LUNG: diffuse rales  HEART: Regular rate and rhythm. SARTHAK II/VI at LPSB, No gallop, no rub   ABDOMEN: Soft, Nontender, BS+nt, No flank tenderness.   EXTREMITIES: No clubbing, cyanosis, or edema, no calf tenderness  Neurology: AAOx3, no focal neurological deficit  SKIN: No rashes or lesions      CAPILLARY BLOOD GLUCOSE          I&O's Summary        LABS:                            10.6   9.59  )-----------( 255      ( 01 Apr 2020 16:47 )             32.0         CARDIAC MARKERS ( 01 Apr 2020 16:47 )  x     / x     / 288 U/L / x     / 4.7 ng/mL          RADIOLOGY & ADDITIONAL TESTS:    EKG/Telemetry: Reviewed

## 2020-04-01 NOTE — H&P ADULT - PROBLEM SELECTOR PLAN 3
Pt with no chest pain or ischemic changes on ECG.  has elevated trop T to 0.09.    - Likely due to renal failure   - repeat ECG in the morning

## 2020-04-01 NOTE — ED PROVIDER NOTE - PHYSICAL EXAMINATION
VITAL SIGNS: I have reviewed nursing notes and confirm.  CONSTITUTIONAL: Well-developed; well-nourished; in no acute distress.   SKIN:  warm and dry, no acute rash.   HEAD:  normocephalic, atraumatic.  EYES: EOM intact; conjunctiva and sclera clear.  ENT: No nasal discharge; airway clear.   NECK: Supple; non tender.  CARD: S1, S2 normal; no murmurs, gallops, or rubs. Regular rate and rhythm.   RESP: Dimished breath sounds bilaterally. On nonrebreather satting 98%-99%  ABD: Normal bowel sounds; soft; non-distended; non-tender; no guarding/ rebound.  EXT: Normal ROM. No clubbing, cyanosis or edema. 2+ pulses to b/l ue/le.  NEURO: Alert, oriented, grossly unremarkable  PSYCH: Cooperative, mood and affect appropriate.

## 2020-04-01 NOTE — H&P ADULT - PROBLEM SELECTOR PLAN 4
Pt with IDDM with lantus 24 units and pranding TID at home.     - Moderate dose sliding scale  - lantus 20 units qhs   - A1c in AM

## 2020-04-02 DIAGNOSIS — B34.2 CORONAVIRUS INFECTION, UNSPECIFIED: ICD-10-CM

## 2020-04-02 LAB
ALBUMIN SERPL ELPH-MCNC: 2.8 G/DL — LOW (ref 3.3–5)
ALP SERPL-CCNC: 61 U/L — SIGNIFICANT CHANGE UP (ref 40–120)
ALT FLD-CCNC: 89 U/L — HIGH (ref 10–45)
ANION GAP SERPL CALC-SCNC: 16 MMOL/L — SIGNIFICANT CHANGE UP (ref 5–17)
AST SERPL-CCNC: 85 U/L — HIGH (ref 10–40)
BASOPHILS # BLD AUTO: 0.01 K/UL — SIGNIFICANT CHANGE UP (ref 0–0.2)
BASOPHILS NFR BLD AUTO: 0.1 % — SIGNIFICANT CHANGE UP (ref 0–2)
BILIRUB SERPL-MCNC: 0.5 MG/DL — SIGNIFICANT CHANGE UP (ref 0.2–1.2)
BUN SERPL-MCNC: 124 MG/DL — HIGH (ref 7–23)
CALCIUM SERPL-MCNC: 9.3 MG/DL — SIGNIFICANT CHANGE UP (ref 8.4–10.5)
CHLORIDE SERPL-SCNC: 114 MMOL/L — HIGH (ref 96–108)
CO2 SERPL-SCNC: 16 MMOL/L — LOW (ref 22–31)
CREAT SERPL-MCNC: 7.08 MG/DL — HIGH (ref 0.5–1.3)
CRP SERPL-MCNC: 8.96 MG/DL — HIGH (ref 0–0.4)
D DIMER BLD IA.RAPID-MCNC: 183 NG/ML DDU — SIGNIFICANT CHANGE UP
EOSINOPHIL # BLD AUTO: 0 K/UL — SIGNIFICANT CHANGE UP (ref 0–0.5)
EOSINOPHIL NFR BLD AUTO: 0 % — SIGNIFICANT CHANGE UP (ref 0–6)
ERYTHROCYTE [SEDIMENTATION RATE] IN BLOOD: 100 MM/HR — HIGH
FERRITIN SERPL-MCNC: 1862 NG/ML — HIGH (ref 30–400)
GLUCOSE BLDC GLUCOMTR-MCNC: 128 MG/DL — HIGH (ref 70–99)
GLUCOSE BLDC GLUCOMTR-MCNC: 142 MG/DL — HIGH (ref 70–99)
GLUCOSE SERPL-MCNC: 132 MG/DL — HIGH (ref 70–99)
HAV IGM SER-ACNC: SIGNIFICANT CHANGE UP
HBA1C BLD-MCNC: 5.7 % — HIGH (ref 4–5.6)
HBV CORE AB SER-ACNC: SIGNIFICANT CHANGE UP
HBV CORE IGM SER-ACNC: SIGNIFICANT CHANGE UP
HBV SURFACE AB SER-ACNC: SIGNIFICANT CHANGE UP
HBV SURFACE AG SER-ACNC: SIGNIFICANT CHANGE UP
HBV SURFACE AG SER-ACNC: SIGNIFICANT CHANGE UP
HCT VFR BLD CALC: 29.5 % — LOW (ref 39–50)
HCV AB S/CO SERPL IA: 0.05 S/CO — SIGNIFICANT CHANGE UP
HCV AB S/CO SERPL IA: 0.05 S/CO — SIGNIFICANT CHANGE UP
HCV AB SERPL-IMP: SIGNIFICANT CHANGE UP
HCV AB SERPL-IMP: SIGNIFICANT CHANGE UP
HGB BLD-MCNC: 9.6 G/DL — LOW (ref 13–17)
IMM GRANULOCYTES NFR BLD AUTO: 1.7 % — HIGH (ref 0–1.5)
LYMPHOCYTES # BLD AUTO: 0.51 K/UL — LOW (ref 1–3.3)
LYMPHOCYTES # BLD AUTO: 4.9 % — LOW (ref 13–44)
MAGNESIUM SERPL-MCNC: 3 MG/DL — HIGH (ref 1.6–2.6)
MCHC RBC-ENTMCNC: 32.5 GM/DL — SIGNIFICANT CHANGE UP (ref 32–36)
MCHC RBC-ENTMCNC: 32.9 PG — SIGNIFICANT CHANGE UP (ref 27–34)
MCV RBC AUTO: 101 FL — HIGH (ref 80–100)
MONOCYTES # BLD AUTO: 0.36 K/UL — SIGNIFICANT CHANGE UP (ref 0–0.9)
MONOCYTES NFR BLD AUTO: 3.5 % — SIGNIFICANT CHANGE UP (ref 2–14)
NEUTROPHILS # BLD AUTO: 9.25 K/UL — HIGH (ref 1.8–7.4)
NEUTROPHILS NFR BLD AUTO: 89.8 % — HIGH (ref 43–77)
NRBC # BLD: 0 /100 WBCS — SIGNIFICANT CHANGE UP (ref 0–0)
PHOSPHATE SERPL-MCNC: 5.6 MG/DL — HIGH (ref 2.5–4.5)
PLATELET # BLD AUTO: 231 K/UL — SIGNIFICANT CHANGE UP (ref 150–400)
POTASSIUM SERPL-MCNC: 4.2 MMOL/L — SIGNIFICANT CHANGE UP (ref 3.5–5.3)
POTASSIUM SERPL-SCNC: 4.2 MMOL/L — SIGNIFICANT CHANGE UP (ref 3.5–5.3)
PROT SERPL-MCNC: 5.9 G/DL — LOW (ref 6–8.3)
RBC # BLD: 2.92 M/UL — LOW (ref 4.2–5.8)
RBC # FLD: 13.5 % — SIGNIFICANT CHANGE UP (ref 10.3–14.5)
SODIUM SERPL-SCNC: 146 MMOL/L — HIGH (ref 135–145)
TROPONIN T SERPL-MCNC: 0.1 NG/ML — CRITICAL HIGH (ref 0–0.01)
WBC # BLD: 10.31 K/UL — SIGNIFICANT CHANGE UP (ref 3.8–10.5)
WBC # FLD AUTO: 10.31 K/UL — SIGNIFICANT CHANGE UP (ref 3.8–10.5)

## 2020-04-02 PROCEDURE — 77001 FLUOROGUIDE FOR VEIN DEVICE: CPT | Mod: 26

## 2020-04-02 PROCEDURE — 36556 INSERT NON-TUNNEL CV CATH: CPT

## 2020-04-02 PROCEDURE — 90935 HEMODIALYSIS ONE EVALUATION: CPT

## 2020-04-02 PROCEDURE — 76937 US GUIDE VASCULAR ACCESS: CPT | Mod: 26

## 2020-04-02 PROCEDURE — 99233 SBSQ HOSP IP/OBS HIGH 50: CPT | Mod: GC

## 2020-04-02 RX ORDER — ACETAMINOPHEN 500 MG
975 TABLET ORAL EVERY 8 HOURS
Refills: 0 | Status: DISCONTINUED | OUTPATIENT
Start: 2020-04-02 | End: 2020-04-14

## 2020-04-02 RX ORDER — TUBERCULIN PURIFIED PROTEIN DERIVATIVE 5 [IU]/.1ML
5 INJECTION, SOLUTION INTRADERMAL ONCE
Refills: 0 | Status: DISCONTINUED | OUTPATIENT
Start: 2020-04-02 | End: 2020-04-16

## 2020-04-02 RX ORDER — HYDROXYCHLOROQUINE SULFATE 200 MG
200 TABLET ORAL EVERY 12 HOURS
Refills: 0 | Status: COMPLETED | OUTPATIENT
Start: 2020-04-03 | End: 2020-04-06

## 2020-04-02 RX ORDER — FAMOTIDINE 10 MG/ML
20 INJECTION INTRAVENOUS
Refills: 0 | Status: DISCONTINUED | OUTPATIENT
Start: 2020-04-04 | End: 2020-04-22

## 2020-04-02 RX ORDER — ALLOPURINOL 300 MG
100 TABLET ORAL DAILY
Refills: 0 | Status: DISCONTINUED | OUTPATIENT
Start: 2020-04-02 | End: 2020-04-22

## 2020-04-02 RX ORDER — ACETAMINOPHEN 500 MG
650 TABLET ORAL EVERY 6 HOURS
Refills: 0 | Status: DISCONTINUED | OUTPATIENT
Start: 2020-04-02 | End: 2020-04-02

## 2020-04-02 RX ADMIN — HEPARIN SODIUM 5000 UNIT(S): 5000 INJECTION INTRAVENOUS; SUBCUTANEOUS at 13:06

## 2020-04-02 RX ADMIN — FAMOTIDINE 20 MILLIGRAM(S): 10 INJECTION INTRAVENOUS at 05:55

## 2020-04-02 RX ADMIN — Medication 8 MILLIGRAM(S): at 21:46

## 2020-04-02 RX ADMIN — Medication 1500 MILLIGRAM(S): at 05:56

## 2020-04-02 RX ADMIN — AZITHROMYCIN 250 MILLIGRAM(S): 500 TABLET, FILM COATED ORAL at 18:49

## 2020-04-02 RX ADMIN — Medication 100 MILLIGRAM(S): at 12:22

## 2020-04-02 RX ADMIN — Medication 100 MILLIGRAM(S): at 07:42

## 2020-04-02 RX ADMIN — MONTELUKAST 10 MILLIGRAM(S): 4 TABLET, CHEWABLE ORAL at 19:26

## 2020-04-02 RX ADMIN — Medication 400 MILLIGRAM(S): at 07:43

## 2020-04-02 RX ADMIN — Medication 200 MILLIGRAM(S): at 12:22

## 2020-04-02 RX ADMIN — Medication 975 MILLIGRAM(S): at 12:22

## 2020-04-02 RX ADMIN — HEPARIN SODIUM 5000 UNIT(S): 5000 INJECTION INTRAVENOUS; SUBCUTANEOUS at 05:55

## 2020-04-02 RX ADMIN — ATORVASTATIN CALCIUM 20 MILLIGRAM(S): 80 TABLET, FILM COATED ORAL at 20:52

## 2020-04-02 RX ADMIN — Medication 400 MILLIGRAM(S): at 18:48

## 2020-04-02 RX ADMIN — Medication 81 MILLIGRAM(S): at 20:52

## 2020-04-02 RX ADMIN — INSULIN GLARGINE 20 UNIT(S): 100 INJECTION, SOLUTION SUBCUTANEOUS at 22:04

## 2020-04-02 RX ADMIN — Medication 975 MILLIGRAM(S): at 20:51

## 2020-04-02 RX ADMIN — HEPARIN SODIUM 5000 UNIT(S): 5000 INJECTION INTRAVENOUS; SUBCUTANEOUS at 20:51

## 2020-04-02 RX ADMIN — Medication 650 MILLIGRAM(S): at 06:02

## 2020-04-02 NOTE — PROGRESS NOTE ADULT - PROBLEM SELECTOR PLAN 8
Fluids: s/p __ NS, no more IVF, encourage PO intake   Electrolytes-replete as needed  Nutrition - DASH/TLC/carb consistent   Code- Full Code  DVT ppx: HSQ   GI ppx: pepcid every other day (ESRD)  DIspo: RMF

## 2020-04-02 NOTE — PROGRESS NOTE ADULT - ASSESSMENT
cont HD treatment for uremia x 2 hours today   clearance only -- no UF   BP low side during rx -- 90s systolic --  to hold amlodpine

## 2020-04-02 NOTE — PROGRESS NOTE ADULT - SUBJECTIVE AND OBJECTIVE BOX
Patient seen and examined at bedside.   seen on HD   sleepy but arousable --denies specific complaints     acetaminophen   Tablet .. 975 milliGRAM(s) every 8 hours  allopurinol 100 milliGRAM(s) daily  aspirin enteric coated 81 milliGRAM(s) every 24 hours  atorvastatin 20 milliGRAM(s) at bedtime  azithromycin   Tablet 250 milliGRAM(s) every 24 hours  dextrose 40% Gel 15 Gram(s) once PRN  dextrose 5%. 1000 milliLiter(s) <Continuous>  dextrose 50% Injectable 12.5 Gram(s) once  dextrose 50% Injectable 25 Gram(s) once  dextrose 50% Injectable 25 Gram(s) once  doxazosin 8 milliGRAM(s) at bedtime  glucagon  Injectable 1 milliGRAM(s) once PRN  heparin  Injectable 5000 Unit(s) every 8 hours  hydroxychloroquine 400 milliGRAM(s) every 12 hours  insulin glargine Injectable (LANTUS) 20 Unit(s) at bedtime  insulin lispro (HumaLOG) corrective regimen sliding scale   Before meals and at bedtime  montelukast 10 milliGRAM(s) every 24 hours  PPD  5 Tuberculin Unit(s) Injectable 5 Unit(s) once      Allergies    penicillin (Unknown)  sulfa drugs (Unknown)    Intolerances        T(C): , Max: 38.4 (20 @ 04:56)  T(F): , Max: 101.2 (20 @ 04:56)  HR: 79 (20 @ 13:50)  BP: 127/61 (20 @ 13:50)  BP(mean): 79 (20 @ 13:50)  RR: 20 (20 @ 13:50)  SpO2: 95% (20 @ 13:50)  Wt(kg): --     @ 07:01  -   @ 07:00  --------------------------------------------------------  IN:    IV PiggyBack: 300 mL  Total IN: 300 mL    OUT:    Indwelling Catheter - Urethral: 1975 mL    Voided: 400 mL  Total OUT: 2375 mL    Total NET: -2075 mL              PHYSICAL EXAM:limited exam due to COVID  Constitutional: sleepy but arousable, NAD on NC  ENMT: Moist mucous membrane.  No cyanosis.  Neck:  No JVD.  Cardiovascular:   Regular rate and rhythm.    Gastrointestinal: soft, non-tender  Extremities: Warm.  No lower extremity edema.    Neurological: No focal deficits.  Skin: Warm. Dry.     Psychiatric: Normal affect.    ACCESS:       LABS:                        9.6    10.31 )-----------( 231      ( 2020 08:16 )             29.5     04-02    146<H>  |  114<H>  |  124<H>  ----------------------------<  132<H>  4.2   |  16<L>  |  7.08<H>    Ca    9.3      2020 08:16  Phos  5.6     04-02  Mg     3.0     04-02    TPro  5.9<L>  /  Alb  2.8<L>  /  TBili  0.5  /  DBili  x   /  AST  85<H>  /  ALT  89<H>  /  AlkPhos  61  04-02    Hemoglobin A1C, Whole Blood: 5.7 % <H> [4.0 - 5.6] ( @ 08:16)      Urinalysis Basic - ( 2020 17:41 )    Color: Yellow / Appearance: SL Cloudy / S.025 / pH: x  Gluc: x / Ketone: NEGATIVE  / Bili: Negative / Urobili: 0.2 E.U./dL   Blood: x / Protein: 100 mg/dL / Nitrite: NEGATIVE   Leuk Esterase: Trace / RBC: < 5 /HPF / WBC < 5 /HPF   Sq Epi: x / Non Sq Epi: 0-5 /HPF / Bacteria: Present /HPF            RADIOLOGY & ADDITIONAL STUDIES:

## 2020-04-02 NOTE — PROGRESS NOTE ADULT - SUBJECTIVE AND OBJECTIVE BOX
INTERVAL HPI/OVERNIGHT EVENTS:  Patient was seen and examined at bedside. As per nurse and patient, no o/n events, patient resting comfortably. Patient complaining of SOB. Patient denies:  chills, dizziness, Changes in vision, CP, palpitations, N/V/D/C, dysuria, changes in bowel movements, LE edema. ROS otherwise negative.    VITAL SIGNS:  T(F): 99.3 (20 @ 08:14)  HR: 84 (20 @ 08:14)  BP: 97/51 (20 @ 08:14)  RR: 22 (20 @ 09:30)  SpO2: 95% (20 @ 09:30)  Wt(kg): --    PHYSICAL EXAM:    Constitutional: WDWN, NAD  HEENT: PERRL, EOMI, sclera non-icteric, neck supple, trachea midline, no masses, no JVD, MMM, good dentition  Respiratory:  bilateral decreased lungs sounds heard minimal accessory muscle use and no intercostal retractions  Cardiovascular: RRR, normal S1S2, no M/R/G  Gastrointestinal: soft, NTND, no masses palpable, BS normal  Extremities: Warm, well perfused, pulses equal bilateral upper and lower extremities, no edema, no clubbing  Neurological: AAOx3, CN Grossly intact  Skin: Normal temperature, warm, dry    MEDICATIONS  (STANDING):  acetaminophen   Tablet .. 975 milliGRAM(s) Oral every 8 hours  allopurinol 100 milliGRAM(s) Oral daily  aspirin enteric coated 81 milliGRAM(s) Oral every 24 hours  atorvastatin 20 milliGRAM(s) Oral at bedtime  azithromycin   Tablet 250 milliGRAM(s) Oral every 24 hours  dextrose 5%. 1000 milliLiter(s) (50 mL/Hr) IV Continuous <Continuous>  dextrose 50% Injectable 12.5 Gram(s) IV Push once  dextrose 50% Injectable 25 Gram(s) IV Push once  dextrose 50% Injectable 25 Gram(s) IV Push once  doxazosin 8 milliGRAM(s) Oral at bedtime  heparin  Injectable 5000 Unit(s) SubCutaneous every 8 hours  hydroxychloroquine 400 milliGRAM(s) Oral every 12 hours  insulin glargine Injectable (LANTUS) 20 Unit(s) SubCutaneous at bedtime  insulin lispro (HumaLOG) corrective regimen sliding scale   SubCutaneous Before meals and at bedtime  montelukast 10 milliGRAM(s) Oral every 24 hours  thiamine 200 milliGRAM(s) Oral <User Schedule>    MEDICATIONS  (PRN):  dextrose 40% Gel 15 Gram(s) Oral once PRN Blood Glucose LESS THAN 70 milliGRAM(s)/deciliter  glucagon  Injectable 1 milliGRAM(s) IntraMuscular once PRN Glucose LESS THAN 70 milligrams/deciliter      Allergies    penicillin (Unknown)  sulfa drugs (Unknown)    Intolerances        LABS:                        9.6    10.31 )-----------( 231      ( 2020 08:16 )             29.5     04-02    146<H>  |  114<H>  |  x   ----------------------------<  132<H>  4.2   |  16<L>  |  7.08<H>    Ca    9.3      2020 08:16  Phos  5.6     04-02  Mg     3.0     04-02    TPro  5.9<L>  /  Alb  2.8<L>  /  TBili  0.5  /  DBili  x   /  AST  85<H>  /  ALT  89<H>  /  AlkPhos  61  04-02      Urinalysis Basic - ( 2020 17:41 )    Color: Yellow / Appearance: SL Cloudy / S.025 / pH: x  Gluc: x / Ketone: NEGATIVE  / Bili: Negative / Urobili: 0.2 E.U./dL   Blood: x / Protein: 100 mg/dL / Nitrite: NEGATIVE   Leuk Esterase: Trace / RBC: < 5 /HPF / WBC < 5 /HPF   Sq Epi: x / Non Sq Epi: 0-5 /HPF / Bacteria: Present /HPF        RADIOLOGY & ADDITIONAL TESTS:  Reviewed

## 2020-04-02 NOTE — PROGRESS NOTE ADULT - PROBLEM SELECTOR PLAN 2
Pt with hx of CKD now presented with BUN oBUN 114, Cr 6.73, (unknown baseline).   - renal is on board--> will start HD tomorrow  - monitor kidney function   - hold lasix for now Pt with hx of CKD now presented with BUN oBUN 114, Cr 6.73, (unknown baseline).   - renal is on board--> will start HD today  - monitor kidney function   - hold lasix for now Pt with hx of exposure with someone with cough, presented with fatigue, no fever, cough or SOB. had sat 86% on room air at the time of presentation. Pt was put on NRB mask with 15 liter and 100 fio2 and saturation improved to 100%. his WBC was 9.5 with leukopenia of 0.5, d-dimer 189, , Cr 6.73,  AST 82, ALT 91, CRP 7.8, Ferritin 1737, , Ck 288, trop 0.09  CXR showed Bilateral patchy/hazy opacities with a mid/lower lung zone predominance concerning for COVID. pt received a dose of lasix 80 iv and made 400 cc urine, as well as a dose of IV azithromycin. COVID test was sent.     pneumonia likely due to COVID:    - c/w NRB  or nasal cannula  for now-- monitor for hypoxemia  - started azithromycin 250 for 5 days total  - started on plaquenil for 5 days (400 bid for the first day and 200 bid for the rest of the duration)  - Thiamine 200 po q12 and ascorbic acid 1500 q6 for 5 days   - Daily d-dimer, CRP and ferritin  - Pending G6PD

## 2020-04-02 NOTE — PROGRESS NOTE ADULT - PROBLEM SELECTOR PLAN 3
Pt with no chest pain or ischemic changes on ECG.  has elevated trop T to 0.09.    - Likely due to renal failure   - repeat ECG in the morning Pt with hx of CKD now presented with BUN oBUN 114, Cr 6.73, (unknown baseline).   - renal is on board--> will start HD today  - monitor kidney function   - hold lasix for now

## 2020-04-02 NOTE — PROGRESS NOTE ADULT - SUBJECTIVE AND OBJECTIVE BOX
Patient initiated on HD today. Transferred to [facility] from [facility] for private room to undergo dialysis today. R IJ line placed by IR today.     Patient seen while being transferred in hallway. NAD, on 6L NC.      Vital Signs Last 12 Hrs  T(F): 99.3 (20 @ 08:14), Max: 101.2 (20 @ 04:56)  HR: 84 (20 @ 08:14) (84 - 96)  BP: 97/51 (20 @ 08:14) (97/51 - 135/69)  BP(mean): --  RR: 22 (20 @ 09:30) (20 - 22)  SpO2: 95% (20 @ 09:30) (95% - 96%)  I&O's Summary    2020 07:01  -  2020 07:00  --------------------------------------------------------  IN: 300 mL / OUT: 2375 mL / NET: -2075 mL        PHYSICAL EXAM:  See AM progress note.    LABS:                        9.6    10.31 )-----------( 231      ( 2020 08:16 )             29.5     04-02    146<H>  |  114<H>  |  124<H>  ----------------------------<  132<H>  4.2   |  16<L>  |  7.08<H>    Ca    9.3      2020 08:16  Phos  5.6     04-02  Mg     3.0     04-02    TPro  5.9<L>  /  Alb  2.8<L>  /  TBili  0.5  /  DBili  x   /  AST  85<H>  /  ALT  89<H>  /  AlkPhos  61  04-02      Urinalysis Basic - ( 2020 17:41 )    Color: Yellow / Appearance: SL Cloudy / S.025 / pH: x  Gluc: x / Ketone: NEGATIVE  / Bili: Negative / Urobili: 0.2 E.U./dL   Blood: x / Protein: 100 mg/dL / Nitrite: NEGATIVE   Leuk Esterase: Trace / RBC: < 5 /HPF / WBC < 5 /HPF   Sq Epi: x / Non Sq Epi: 0-5 /HPF / Bacteria: Present /HPF        RADIOLOGY & ADDITIONAL TESTS: Reviewed.    MEDICATIONS  (STANDING):  acetaminophen   Tablet .. 975 milliGRAM(s) Oral every 8 hours  allopurinol 100 milliGRAM(s) Oral daily  aspirin enteric coated 81 milliGRAM(s) Oral every 24 hours  atorvastatin 20 milliGRAM(s) Oral at bedtime  azithromycin   Tablet 250 milliGRAM(s) Oral every 24 hours  dextrose 5%. 1000 milliLiter(s) (50 mL/Hr) IV Continuous <Continuous>  dextrose 50% Injectable 12.5 Gram(s) IV Push once  dextrose 50% Injectable 25 Gram(s) IV Push once  dextrose 50% Injectable 25 Gram(s) IV Push once  doxazosin 8 milliGRAM(s) Oral at bedtime  heparin  Injectable 5000 Unit(s) SubCutaneous every 8 hours  hydroxychloroquine 400 milliGRAM(s) Oral every 12 hours  insulin glargine Injectable (LANTUS) 20 Unit(s) SubCutaneous at bedtime  insulin lispro (HumaLOG) corrective regimen sliding scale   SubCutaneous Before meals and at bedtime  montelukast 10 milliGRAM(s) Oral every 24 hours  PPD  5 Tuberculin Unit(s) Injectable 5 Unit(s) IntraDermal once    MEDICATIONS  (PRN):  dextrose 40% Gel 15 Gram(s) Oral once PRN Blood Glucose LESS THAN 70 milliGRAM(s)/deciliter  glucagon  Injectable 1 milliGRAM(s) IntraMuscular once PRN Glucose LESS THAN 70 milligrams/deciliter      Allergies    penicillin (Unknown)  sulfa drugs (Unknown)    Intolerances

## 2020-04-02 NOTE — PROGRESS NOTE ADULT - PROBLEM SELECTOR PLAN 1
Pt with hx of exposure with someone with cough, presented with fatigue, no fever, cough or SOB. had sat 86% on room air at the time of presentation. Pt was put on NRB mask with 15 liter and 100 fio2 and saturation improved to 100%. his WBC was 9.5 with leukopenia of 0.5, d-dimer 189, , Cr 6.73,  AST 82, ALT 91, CRP 7.8, Ferritin 1737, , Ck 288, trop 0.09  CXR showed Bilateral patchy/hazy opacities with a mid/lower lung zone predominance concerning for COVID. pt received a dose of lasix 80 iv and made 400 cc urine, as well as a dose of IV azithromycin. COVID test +    pneumonia likely due to COVID:    - c/w NRB  or nasal cannula  for now-- monitor for hypoxemia  - started azithromycin 250 for 5 days total  - started on plaquenil for 5 days (400 bid for the first day and 200 bid for the rest of the duration)  - Thiamine 200 po q12 , holding ascorbic acid 2/2 ESRD  - Daily d-dimer, CRP and ferritin  - Pending G6PD

## 2020-04-02 NOTE — PROGRESS NOTE ADULT - PROBLEM SELECTOR PLAN 3
Pt with no chest pain or ischemic changes on ECG.  has elevated trop T to 0.09, repeat stable at 0.1    - Likely due to renal failure   - ECG stable

## 2020-04-02 NOTE — PROGRESS NOTE ADULT - ASSESSMENT
HPI: A 84 years old M with PMH of IDDM, HTN, HLD, CKD stage 4, sleep apnea (has CPAP machine), gout, Paget's s/p Reclast in 6/2014. 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 and verrucous acanthoma, presented from his nephrologist office (Dr. Dover) because of fatigue and poor appetite and malaise w/ apparent increased creatinine in past week w/ productive cough.   Found to be COVID+. Patient initiated on HD 4/2.

## 2020-04-02 NOTE — PROGRESS NOTE ADULT - PROBLEM SELECTOR PLAN 7
Pt is on allopurinol 100 bid and PRN colchicine at home   - resume allopurinol 100 bid Pt is on allopurinol 100 bid and PRN colchicine at home   - redose allopurinol 100 mg to once a day

## 2020-04-02 NOTE — PROGRESS NOTE ADULT - PROBLEM SELECTOR PLAN 1
Pt with hx of exposure with someone with cough, presented with fatigue, no fever, cough or SOB. had sat 86% on room air at the time of presentation. Pt was put on NRB mask with 15 liter and 100 fio2 and saturation improved to 100%. his WBC was 9.5 with leukopenia of 0.5, d-dimer 189, , Cr 6.73,  AST 82, ALT 91, CRP 7.8, Ferritin 1737, , Ck 288, trop 0.09  CXR showed Bilateral patchy/hazy opacities with a mid/lower lung zone predominance concerning for COVID. pt received a dose of lasix 80 iv and made 400 cc urine, as well as a dose of IV azithromycin. COVID test was sent.     pneumonia likely due to COVID:    - f/u the result of COVID test   - c/w NRB mask for now-- monitor for hypoxemia  - started azithromycin 250 for 5 days total  - started on plaquenil for 5 days (400 bid for the first day and 200 bid for the rest of the duration)  - Thiamine 200 po q12 and ascorbic acid 1500 q6 for 5 days   - Daily d-dimer, CRP and ferritin  - Pending G6PD Pt with hx of exposure with someone with cough, presented with fatigue, no fever, cough or SOB. had sat 86% on room air at the time of presentation. Pt was put on NRB mask with 15 liter and 100 fio2 and saturation improved to 100%. his WBC was 9.5 with leukopenia of 0.5, d-dimer 189, , Cr 6.73,  AST 82, ALT 91, CRP 7.8, Ferritin 1737, , Ck 288, trop 0.09  CXR showed Bilateral patchy/hazy opacities with a mid/lower lung zone predominance concerning for COVID. pt received a dose of lasix 80 iv and made 400 cc urine, as well as a dose of IV azithromycin. COVID test was sent.     pneumonia likely due to COVID:    - c/w NRB  or nasal cannula  for now-- monitor for hypoxemia  - started azithromycin 250 for 5 days total  - started on plaquenil for 5 days (400 bid for the first day and 200 bid for the rest of the duration)  - Thiamine 200 po q12 and ascorbic acid 1500 q6 for 5 days   - Daily d-dimer, CRP and ferritin  - Pending G6PD

## 2020-04-02 NOTE — PROGRESS NOTE ADULT - PROBLEM SELECTOR PLAN 2
Pt with hx of CKD now presented with BUN oBUN 114, Cr 6.73, (unknown baseline).   - renal is on board--> will start HD today  - monitor kidney function   - hold lasix for now

## 2020-04-02 NOTE — PROGRESS NOTE ADULT - PROBLEM SELECTOR PLAN 5
Pt on lasix 20 bid and norvasc 10 at home - Holding for now, SBP .  Continue doxazosin    - Hold norvasc 10 daily  - resumed doxazosin 8 daily  - hold lasix

## 2020-04-02 NOTE — PROGRESS NOTE ADULT - SUBJECTIVE AND OBJECTIVE BOX
Vascular & Interventional Radiology Post-Procedure Note    Pre-Procedure Diagnosis:  ESRD  Post-Procedure Diagnosis: Same as pre.  Indications for Procedure:  hemodialysis    : MD Isabella  Assistant(s):    Procedure Details/Findings:   non-tunneled HD catheter placement via R IJ. 20 cm  Complications:  none  Estimated Blood Loss: Minimal  Specimen:  none  Contrast:  none  Sedation:  none  Patient Condition/Disposition:  floor, stable  Plan:   - catheter ready for use

## 2020-04-02 NOTE — PROGRESS NOTE ADULT - ASSESSMENT
HPI: A 84 years old M with PMH of IDDM, HTN, HLD, CKD stage 4, sleep apnea (has CPAP machine), gout, Paget's s/p Reclast in 6/2014. 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 and verrucous acanthoma, presented from his nephrologist office (Dr. Jaffe?) because of fatigue and poor appetite and malaise w/ apparent increased creatinine in past week w/ productive cough.   COVID was sent for the pt

## 2020-04-02 NOTE — INPATIENT CERTIFICATION FOR MEDICARE PATIENTS - RISKS OF ADVERSE EVENTS
Concern for cardiopulmonary deterioration/Concern for renal deterioration/Concern for worsening infectious process

## 2020-04-02 NOTE — PROGRESS NOTE ADULT - PROBLEM SELECTOR PLAN 5
Pt on lasix 20 bid, norvasc 10 and doxazosin at home. currently BP is around 160    - resumed norvasc 10 daily  - resumed doxazosin 8 daily  - hold lasix Pt on lasix 20 bid, norvasc 10 and doxazosin at home. currently BP is around 160    - Hold norvasc 10 daily  - resumed doxazosin 8 daily  - hold lasix

## 2020-04-03 LAB
ALBUMIN SERPL ELPH-MCNC: 2.7 G/DL — LOW (ref 3.3–5)
ALP SERPL-CCNC: 61 U/L — SIGNIFICANT CHANGE UP (ref 40–120)
ALT FLD-CCNC: 82 U/L — HIGH (ref 10–45)
ANION GAP SERPL CALC-SCNC: 15 MMOL/L — SIGNIFICANT CHANGE UP (ref 5–17)
AST SERPL-CCNC: 79 U/L — HIGH (ref 10–40)
BASOPHILS # BLD AUTO: 0.01 K/UL — SIGNIFICANT CHANGE UP (ref 0–0.2)
BASOPHILS NFR BLD AUTO: 0.1 % — SIGNIFICANT CHANGE UP (ref 0–2)
BILIRUB SERPL-MCNC: 0.6 MG/DL — SIGNIFICANT CHANGE UP (ref 0.2–1.2)
BUN SERPL-MCNC: 89 MG/DL — HIGH (ref 7–23)
CALCIUM SERPL-MCNC: 9.1 MG/DL — SIGNIFICANT CHANGE UP (ref 8.4–10.5)
CHLORIDE SERPL-SCNC: 107 MMOL/L — SIGNIFICANT CHANGE UP (ref 96–108)
CO2 SERPL-SCNC: 23 MMOL/L — SIGNIFICANT CHANGE UP (ref 22–31)
CREAT SERPL-MCNC: 6.42 MG/DL — HIGH (ref 0.5–1.3)
CRP SERPL-MCNC: 15.1 MG/DL — HIGH (ref 0–0.4)
CULTURE RESULTS: SIGNIFICANT CHANGE UP
D DIMER BLD IA.RAPID-MCNC: 186 NG/ML DDU — SIGNIFICANT CHANGE UP
EOSINOPHIL # BLD AUTO: 0 K/UL — SIGNIFICANT CHANGE UP (ref 0–0.5)
EOSINOPHIL NFR BLD AUTO: 0 % — SIGNIFICANT CHANGE UP (ref 0–6)
FERRITIN SERPL-MCNC: 2140 NG/ML — HIGH (ref 30–400)
GLUCOSE BLDC GLUCOMTR-MCNC: 116 MG/DL — HIGH (ref 70–99)
GLUCOSE BLDC GLUCOMTR-MCNC: 148 MG/DL — HIGH (ref 70–99)
GLUCOSE BLDC GLUCOMTR-MCNC: 239 MG/DL — HIGH (ref 70–99)
GLUCOSE BLDC GLUCOMTR-MCNC: 51 MG/DL — LOW (ref 70–99)
GLUCOSE SERPL-MCNC: 47 MG/DL — LOW (ref 70–99)
HCT VFR BLD CALC: 27.6 % — LOW (ref 39–50)
HGB BLD-MCNC: 9.3 G/DL — LOW (ref 13–17)
IMM GRANULOCYTES NFR BLD AUTO: 1.5 % — SIGNIFICANT CHANGE UP (ref 0–1.5)
LYMPHOCYTES # BLD AUTO: 0.61 K/UL — LOW (ref 1–3.3)
LYMPHOCYTES # BLD AUTO: 5.3 % — LOW (ref 13–44)
MAGNESIUM SERPL-MCNC: 2.6 MG/DL — SIGNIFICANT CHANGE UP (ref 1.6–2.6)
MCHC RBC-ENTMCNC: 33.1 PG — SIGNIFICANT CHANGE UP (ref 27–34)
MCHC RBC-ENTMCNC: 33.7 GM/DL — SIGNIFICANT CHANGE UP (ref 32–36)
MCV RBC AUTO: 98.2 FL — SIGNIFICANT CHANGE UP (ref 80–100)
MONOCYTES # BLD AUTO: 0.29 K/UL — SIGNIFICANT CHANGE UP (ref 0–0.9)
MONOCYTES NFR BLD AUTO: 2.5 % — SIGNIFICANT CHANGE UP (ref 2–14)
NEUTROPHILS # BLD AUTO: 10.43 K/UL — HIGH (ref 1.8–7.4)
NEUTROPHILS NFR BLD AUTO: 90.6 % — HIGH (ref 43–77)
NRBC # BLD: 0 /100 WBCS — SIGNIFICANT CHANGE UP (ref 0–0)
PHOSPHATE SERPL-MCNC: 5.5 MG/DL — HIGH (ref 2.5–4.5)
PLATELET # BLD AUTO: 238 K/UL — SIGNIFICANT CHANGE UP (ref 150–400)
POTASSIUM SERPL-MCNC: 3.9 MMOL/L — SIGNIFICANT CHANGE UP (ref 3.5–5.3)
POTASSIUM SERPL-SCNC: 3.9 MMOL/L — SIGNIFICANT CHANGE UP (ref 3.5–5.3)
PROT SERPL-MCNC: 5.7 G/DL — LOW (ref 6–8.3)
RBC # BLD: 2.81 M/UL — LOW (ref 4.2–5.8)
RBC # FLD: 13.3 % — SIGNIFICANT CHANGE UP (ref 10.3–14.5)
SODIUM SERPL-SCNC: 145 MMOL/L — SIGNIFICANT CHANGE UP (ref 135–145)
SPECIMEN SOURCE: SIGNIFICANT CHANGE UP
WBC # BLD: 11.51 K/UL — HIGH (ref 3.8–10.5)
WBC # FLD AUTO: 11.51 K/UL — HIGH (ref 3.8–10.5)

## 2020-04-03 PROCEDURE — 99233 SBSQ HOSP IP/OBS HIGH 50: CPT | Mod: GC

## 2020-04-03 PROCEDURE — 90935 HEMODIALYSIS ONE EVALUATION: CPT | Mod: GC

## 2020-04-03 RX ORDER — DEXTROSE 50 % IN WATER 50 %
50 SYRINGE (ML) INTRAVENOUS ONCE
Refills: 0 | Status: COMPLETED | OUTPATIENT
Start: 2020-04-03 | End: 2020-04-03

## 2020-04-03 RX ADMIN — Medication 975 MILLIGRAM(S): at 03:42

## 2020-04-03 RX ADMIN — Medication 200 MILLIGRAM(S): at 19:01

## 2020-04-03 RX ADMIN — HEPARIN SODIUM 5000 UNIT(S): 5000 INJECTION INTRAVENOUS; SUBCUTANEOUS at 13:29

## 2020-04-03 RX ADMIN — HEPARIN SODIUM 5000 UNIT(S): 5000 INJECTION INTRAVENOUS; SUBCUTANEOUS at 21:14

## 2020-04-03 RX ADMIN — Medication 81 MILLIGRAM(S): at 21:12

## 2020-04-03 RX ADMIN — Medication 8 MILLIGRAM(S): at 21:12

## 2020-04-03 RX ADMIN — ATORVASTATIN CALCIUM 20 MILLIGRAM(S): 80 TABLET, FILM COATED ORAL at 21:12

## 2020-04-03 RX ADMIN — Medication 975 MILLIGRAM(S): at 11:57

## 2020-04-03 RX ADMIN — AZITHROMYCIN 250 MILLIGRAM(S): 500 TABLET, FILM COATED ORAL at 19:00

## 2020-04-03 RX ADMIN — Medication 200 MILLIGRAM(S): at 05:07

## 2020-04-03 RX ADMIN — Medication 50 MILLILITER(S): at 08:42

## 2020-04-03 RX ADMIN — Medication 100 MILLIGRAM(S): at 11:57

## 2020-04-03 RX ADMIN — HEPARIN SODIUM 5000 UNIT(S): 5000 INJECTION INTRAVENOUS; SUBCUTANEOUS at 05:07

## 2020-04-03 RX ADMIN — Medication 975 MILLIGRAM(S): at 19:01

## 2020-04-03 RX ADMIN — MONTELUKAST 10 MILLIGRAM(S): 4 TABLET, CHEWABLE ORAL at 19:00

## 2020-04-03 NOTE — PROGRESS NOTE ADULT - PROBLEM SELECTOR PLAN 3
Pt with hx of CKD now presented with BUN oBUN 114, Cr 6.73, (unknown baseline).   - renal is on board--> HD initiated 4/2/20 via R IJ temp catheter   - monitor kidney function   - hold lasix for now  - Will d/c [place], Trial of void

## 2020-04-03 NOTE — PROGRESS NOTE ADULT - SUBJECTIVE AND OBJECTIVE BOX
Patient is a 84y Male seen and evaluated at bedside.   No new complaints.  Vitals, meds, labs reviewed.      Meds:    acetaminophen   Tablet .. 975 every 8 hours  allopurinol 100 daily  aspirin enteric coated 81 every 24 hours  atorvastatin 20 at bedtime  azithromycin   Tablet 250 every 24 hours  dextrose 40% Gel 15 once PRN  dextrose 5%. 1000 <Continuous>  dextrose 50% Injectable 12.5 once  dextrose 50% Injectable 25 once  dextrose 50% Injectable 25 once  doxazosin 8 at bedtime  glucagon  Injectable 1 once PRN  heparin  Injectable 5000 every 8 hours  hydroxychloroquine 200 every 12 hours  insulin lispro (HumaLOG) corrective regimen sliding scale  Before meals and at bedtime  montelukast 10 every 24 hours  PPD  5 Tuberculin Unit(s) Injectable 5 once      Allergies    penicillin (Unknown)  sulfa drugs (Unknown)    Intolerances        T(C): , Max: 37.2 (20 @ 05:43)  T(F): , Max: 98.9 (20 @ 05:43)  HR: 62 (20 @ 14:10)  BP: 114/57 (20 @ 14:10)  BP(mean): 84 (20 @ 14:10)  RR: 20 (20 @ 14:10)  SpO2: 93% (20 @ 14:10)  Wt(kg): --     @ 07:01  -   @ 16:45  --------------------------------------------------------  IN: 0 mL / OUT: 100 mL / NET: -100 mL          Review of Systems:  sleeping      PHYSICAL EXAM as pe primary due to covid:  GENERAL: NAD, well-developed, well nourished, alert, awake, no acute distress at present  NECK: Neck supple, No JVD  CHEST/LUNG: Clear to auscultation bilaterally; No wheeze, no rales, no crepitations  HEART: Regular rate and rhythm. S1S2+  No gallop, no rub   ABDOMEN: Soft, Nontender, BS+nt, No flank tenderness.   EXTREMITIES: No clubbing, cyanosis, or edema  Neurology: AAOx3, no focal neurological deficit            ACCESS:     LABS:                        9.3    11.51 )-----------( 238      ( 2020 07:34 )             27.6     04-03    145  |  107  |  89<H>  ----------------------------<  47<L>  3.9   |  23  |  6.42<H>    Ca    9.1      2020 07:34  Phos  5.5     04-03  Mg     2.6     04-03    TPro  5.7<L>  /  Alb  2.7<L>  /  TBili  0.6  /  DBili  x   /  AST  79<H>  /  ALT  82<H>  /  AlkPhos  61  04-03        Urinalysis Basic - ( 2020 17:41 )    Color: Yellow / Appearance: SL Cloudy / S.025 / pH: x  Gluc: x / Ketone: NEGATIVE  / Bili: Negative / Urobili: 0.2 E.U./dL   Blood: x / Protein: 100 mg/dL / Nitrite: NEGATIVE   Leuk Esterase: Trace / RBC: < 5 /HPF / WBC < 5 /HPF   Sq Epi: x / Non Sq Epi: 0-5 /HPF / Bacteria: Present /HPF            RADIOLOGY & ADDITIONAL STUDIES: Patient was seen and evaluated on dialysis.   HR: 62 (20 @ 14:10)  BP: 114/57 (20 @ 14:10)  Wt(kg): --      145  |  107  |  89<H>  ----------------------------<  47<L>  3.9   |  23  |  6.42<H>    Ca    9.1      2020 07:34  Phos  5.5       Mg     2.6         TPro  5.7<L>  /  Alb  2.7<L>  /  TBili  0.6  /  DBili  x   /  AST  79<H>  /  ALT  82<H>  /  AlkPhos  61      Continue dialysis:   Dialyzer:   180    QB: 300  K bath: 3  Goal UF:    2.5   L   over    150           min  Patient is tolerating the procedure well.         Meds:    acetaminophen   Tablet .. 975 every 8 hours  allopurinol 100 daily  aspirin enteric coated 81 every 24 hours  atorvastatin 20 at bedtime  azithromycin   Tablet 250 every 24 hours  dextrose 40% Gel 15 once PRN  dextrose 5%. 1000 <Continuous>  dextrose 50% Injectable 12.5 once  dextrose 50% Injectable 25 once  dextrose 50% Injectable 25 once  doxazosin 8 at bedtime  glucagon  Injectable 1 once PRN  heparin  Injectable 5000 every 8 hours  hydroxychloroquine 200 every 12 hours  insulin lispro (HumaLOG) corrective regimen sliding scale  Before meals and at bedtime  montelukast 10 every 24 hours  PPD  5 Tuberculin Unit(s) Injectable 5 once      Allergies    penicillin (Unknown)  sulfa drugs (Unknown)    Intolerances        T(C): , Max: 37.2 (20 @ 05:43)  T(F): , Max: 98.9 (20 @ 05:43)  HR: 62 (20 @ 14:10)  BP: 114/57 (20 @ 14:10)  BP(mean): 84 (20 @ 14:10)  RR: 20 (20 @ 14:10)  SpO2: 93% (20 @ 14:10)  Wt(kg): --     @ 07:01  -   @ 16:45  --------------------------------------------------------  IN: 0 mL / OUT: 100 mL / NET: -100 mL          Review of Systems:  sleeping      PHYSICAL EXAM as pe primary due to covid:  GENERAL: NAD, well-developed, well nourished, alert, awake, no acute distress at present  NECK: Neck supple, No JVD  CHEST/LUNG: Clear to auscultation bilaterally; No wheeze, no rales, no crepitations  HEART: Regular rate and rhythm. S1S2+  No gallop, no rub   ABDOMEN: Soft, Nontender, BS+nt, No flank tenderness.   EXTREMITIES: No clubbing, cyanosis, or edema  Neurology: AAOx3, no focal neurological deficit            ACCESS:     LABS:                        9.3    11.51 )-----------( 238      ( 2020 07:34 )             27.6     04-03    145  |  107  |  89<H>  ----------------------------<  47<L>  3.9   |  23  |  6.42<H>    Ca    9.1      2020 07:34  Phos  5.5     04-03  Mg     2.6     04-03    TPro  5.7<L>  /  Alb  2.7<L>  /  TBili  0.6  /  DBili  x   /  AST  79<H>  /  ALT  82<H>  /  AlkPhos  61  04-03        Urinalysis Basic - ( 2020 17:41 )    Color: Yellow / Appearance: SL Cloudy / S.025 / pH: x  Gluc: x / Ketone: NEGATIVE  / Bili: Negative / Urobili: 0.2 E.U./dL   Blood: x / Protein: 100 mg/dL / Nitrite: NEGATIVE   Leuk Esterase: Trace / RBC: < 5 /HPF / WBC < 5 /HPF   Sq Epi: x / Non Sq Epi: 0-5 /HPF / Bacteria: Present /HPF            RADIOLOGY & ADDITIONAL STUDIES: Patient was seen and evaluated on dialysis.  requiring increased FIO2  no specific complaints   BP decreaed to 70s systolic with HD limiting UF    HR 60 BP 88/62  limited exam due to COVID  no JVD  heart RRR  abd soft NT  ext no edema        145  |  107  |  89<H>  ----------------------------<  47<L>  3.9   |  23  |  6.42<H>    Ca    9.1      2020 07:34  Phos  5.5       Mg     2.6         TPro  5.7<L>  /  Alb  2.7<L>  /  TBili  0.6  /  DBili  x   /  AST  79<H>  /  ALT  82<H>  /  AlkPhos  61      Continue dialysis:   Dialyzer:   180    QB: 300  K bath: 3  Goal UF:    2.5   L   over    150           min  Patient is tolerating the procedure well.         Meds:    acetaminophen   Tablet .. 975 every 8 hours  allopurinol 100 daily  aspirin enteric coated 81 every 24 hours  atorvastatin 20 at bedtime  azithromycin   Tablet 250 every 24 hours  dextrose 40% Gel 15 once PRN  dextrose 5%. 1000 <Continuous>  dextrose 50% Injectable 12.5 once  dextrose 50% Injectable 25 once  dextrose 50% Injectable 25 once  doxazosin 8 at bedtime  glucagon  Injectable 1 once PRN  heparin  Injectable 5000 every 8 hours  hydroxychloroquine 200 every 12 hours  insulin lispro (HumaLOG) corrective regimen sliding scale  Before meals and at bedtime  montelukast 10 every 24 hours  PPD  5 Tuberculin Unit(s) Injectable 5 once      Allergies    penicillin (Unknown)  sulfa drugs (Unknown)    Intolerances        T(C): , Max: 37.2 (20 @ 05:43)  T(F): , Max: 98.9 (20 @ 05:43)  HR: 62 (20 @ 14:10)  BP: 114/57 (20 @ 14:10)  BP(mean): 84 (20 @ 14:10)  RR: 20 (20 @ 14:10)  SpO2: 93% (20 @ 14:10)  Wt(kg): --     @ 07:01  -   @ 16:45  --------------------------------------------------------  IN: 0 mL / OUT: 100 mL / NET: -100 mL          Review of Systems:  sleeping      PHYSICAL EXAM as pe primary due to covid:  GENERAL: NAD, well-developed, well nourished, alert, awake, no acute distress at present  NECK: Neck supple, No JVD  CHEST/LUNG: Clear to auscultation bilaterally; No wheeze, no rales, no crepitations  HEART: Regular rate and rhythm. S1S2+  No gallop, no rub   ABDOMEN: Soft, Nontender, BS+nt, No flank tenderness.   EXTREMITIES: No clubbing, cyanosis, or edema  Neurology: AAOx3, no focal neurological deficit            ACCESS:     LABS:                        9.3    11.51 )-----------( 238      ( 2020 07:34 )             27.6     04-03    145  |  107  |  89<H>  ----------------------------<  47<L>  3.9   |  23  |  6.42<H>    Ca    9.1      2020 07:34  Phos  5.5     04-03  Mg     2.6     04-03    TPro  5.7<L>  /  Alb  2.7<L>  /  TBili  0.6  /  DBili  x   /  AST  79<H>  /  ALT  82<H>  /  AlkPhos  61  04-03        Urinalysis Basic - ( 2020 17:41 )    Color: Yellow / Appearance: SL Cloudy / S.025 / pH: x  Gluc: x / Ketone: NEGATIVE  / Bili: Negative / Urobili: 0.2 E.U./dL   Blood: x / Protein: 100 mg/dL / Nitrite: NEGATIVE   Leuk Esterase: Trace / RBC: < 5 /HPF / WBC < 5 /HPF   Sq Epi: x / Non Sq Epi: 0-5 /HPF / Bacteria: Present /HPF            RADIOLOGY & ADDITIONAL STUDIES:

## 2020-04-03 NOTE — CHART NOTE - NSCHARTNOTEFT_GEN_A_CORE
Patient with increasing oxygen requirements during the day. Initially was on nasal cannula, however advanced to NRB during the day. Given acute change in resp status, accepted to tele for closer resp monitoring.

## 2020-04-03 NOTE — PROGRESS NOTE ADULT - ASSESSMENT
HPI: A 84 years old M with PMH of IDDM, HTN, HLD, CKD stage 4, sleep apnea (has CPAP machine), gout, Paget's s/p Reclast in 6/2014. 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 and verrucous acanthoma, presented from his nephrologist office (Dr. Jaffe?) because of fatigue and poor appetite and malaise w/ apparent increased creatinine in past week w/ productive cough.   COVID+ and patient was started on triple therapy (holding ascorbic acid since patient is ESRD).

## 2020-04-03 NOTE — PROGRESS NOTE ADULT - PROBLEM SELECTOR PLAN 1
- Continue azithromycin 250 for 5 days total  - Continue on plaquenil for 5 days (400 bid for the first day and 200 bid for the rest of the duration)  - Continue Thiamine 200 po q12 and ascorbic acid 1500 q6 for 5 days   - Daily d-dimer, CRP and ferritin  - Pending G6PD- Daily d-dimer, CRP and ferritin

## 2020-04-03 NOTE — PROGRESS NOTE ADULT - SUBJECTIVE AND OBJECTIVE BOX
OVERNIGHT EVENTS: Stable on 6L satting 94%, no acute events    SUBJECTIVE: Patient seen and examined at the bedside in full PPE. Patient resting in bed comfortably, NAD.    Vital Signs Last 12 Hrs  T(F): 98.9 (20 @ 05:43), Max: 98.9 (20 @ 05:43)  HR: 88 (20 @ 05:43) (88 - 88)  BP: 131/64 (20 @ 05:43) (131/64 - 131/64)  BP(mean): --  RR: 18 (20 @ 05:43) (18 - 18)  SpO2: 94% (20 @ 05:43) (94% - 94%)  I&O's Summary      Constitutional: WDWN, NAD  HEENT: PERRL, EOMI, sclera non-icteric, neck supple, trachea midline, no masses, no JVD, MMM, good dentition  Respiratory:  bilateral crackles heard minimal accessory muscle use and no intercostal retractions  Cardiovascular: RRR, normal S1S2, no M/R/G  Gastrointestinal: soft, NTND, no masses palpable, BS normal  Extremities: Warm, well perfused, pulses equal bilateral upper and lower extremities, no edema, no clubbing  Neurological: AAOx3, CN Grossly intact  Skin: Normal temperature, warm, dry      LABS:                        9.3    11.51 )-----------( 238      ( 2020 07:34 )             27.6     04-03    145  |  107  |  89<H>  ----------------------------<  47<L>  3.9   |  23  |  6.42<H>    Ca    9.1      2020 07:34  Phos  5.5     04-03  Mg     2.6     04-03    TPro  5.7<L>  /  Alb  2.7<L>  /  TBili  0.6  /  DBili  x   /  AST  79<H>  /  ALT  82<H>  /  AlkPhos  61  04-03      Urinalysis Basic - ( 2020 17:41 )    Color: Yellow / Appearance: SL Cloudy / S.025 / pH: x  Gluc: x / Ketone: NEGATIVE  / Bili: Negative / Urobili: 0.2 E.U./dL   Blood: x / Protein: 100 mg/dL / Nitrite: NEGATIVE   Leuk Esterase: Trace / RBC: < 5 /HPF / WBC < 5 /HPF   Sq Epi: x / Non Sq Epi: 0-5 /HPF / Bacteria: Present /HPF        RADIOLOGY & ADDITIONAL TESTS: Reviewed.    MEDICATIONS  (STANDING):  acetaminophen   Tablet .. 975 milliGRAM(s) Oral every 8 hours  allopurinol 100 milliGRAM(s) Oral daily  aspirin enteric coated 81 milliGRAM(s) Oral every 24 hours  atorvastatin 20 milliGRAM(s) Oral at bedtime  azithromycin   Tablet 250 milliGRAM(s) Oral every 24 hours  dextrose 5%. 1000 milliLiter(s) (50 mL/Hr) IV Continuous <Continuous>  dextrose 50% Injectable 12.5 Gram(s) IV Push once  dextrose 50% Injectable 25 Gram(s) IV Push once  dextrose 50% Injectable 25 Gram(s) IV Push once  doxazosin 8 milliGRAM(s) Oral at bedtime  heparin  Injectable 5000 Unit(s) SubCutaneous every 8 hours  hydroxychloroquine 200 milliGRAM(s) Oral every 12 hours  insulin lispro (HumaLOG) corrective regimen sliding scale   SubCutaneous Before meals and at bedtime  montelukast 10 milliGRAM(s) Oral every 24 hours  PPD  5 Tuberculin Unit(s) Injectable 5 Unit(s) IntraDermal once    MEDICATIONS  (PRN):  dextrose 40% Gel 15 Gram(s) Oral once PRN Blood Glucose LESS THAN 70 milliGRAM(s)/deciliter  glucagon  Injectable 1 milliGRAM(s) IntraMuscular once PRN Glucose LESS THAN 70 milligrams/deciliter      Allergies    penicillin (Unknown)  sulfa drugs (Unknown)    Intolerances Transfer from [facility] to Telemetry:    83 yo M DM, HTN, HLD, CKD stage 4, sleep apnea, Paget's s/p Reclast in 2014 p/w fatigue, poor appetite, cough, and malaise found to be COVID+.  Oliguric and need for HD initiation on admission.  HD cath placed by IR, HD initiated .  Worsening respiratory status and inflammatory markers on 4/3.  Attempted HD again with difficulty removing fluid d/t hypotension.  On maximal nonrebreather with saturation 90%.  Transfer to [facility] for closer monitoring of respiratory status.    OVERNIGHT EVENTS: Stable on 6L satting 94%, no acute events    SUBJECTIVE: Patient seen and examined at the bedside in full PPE. Patient resting in bed comfortably, NAD.    Vital Signs Last 12 Hrs  T(F): 98.9 (20 @ 05:43), Max: 98.9 (20 @ 05:43)  HR: 88 (20 @ 05:43) (88 - 88)  BP: 131/64 (20 @ 05:43) (131/64 - 131/64)  BP(mean): --  RR: 18 (20 @ 05:43) (18 - 18)  SpO2: 94% (20 @ 05:43) (94% - 94%)  I&O's Summary      Constitutional: WDWN, NAD  HEENT: PERRL, EOMI, sclera non-icteric, neck supple, trachea midline, no masses, no JVD, MMM, good dentition  Respiratory:  bilateral crackles heard minimal accessory muscle use and no intercostal retractions  Cardiovascular: RRR, normal S1S2, no M/R/G  Gastrointestinal: soft, NTND, no masses palpable, BS normal  Extremities: Warm, well perfused, pulses equal bilateral upper and lower extremities, no edema, no clubbing  Neurological: AAOx3, CN Grossly intact  Skin: Normal temperature, warm, dry      LABS:                        9.3    11.51 )-----------( 238      ( 2020 07:34 )             27.6     04-03    145  |  107  |  89<H>  ----------------------------<  47<L>  3.9   |  23  |  6.42<H>    Ca    9.1      2020 07:34  Phos  5.5     04-03  Mg     2.6     04-03    TPro  5.7<L>  /  Alb  2.7<L>  /  TBili  0.6  /  DBili  x   /  AST  79<H>  /  ALT  82<H>  /  AlkPhos  61  04-03      Urinalysis Basic - ( 2020 17:41 )    Color: Yellow / Appearance: SL Cloudy / S.025 / pH: x  Gluc: x / Ketone: NEGATIVE  / Bili: Negative / Urobili: 0.2 E.U./dL   Blood: x / Protein: 100 mg/dL / Nitrite: NEGATIVE   Leuk Esterase: Trace / RBC: < 5 /HPF / WBC < 5 /HPF   Sq Epi: x / Non Sq Epi: 0-5 /HPF / Bacteria: Present /HPF        RADIOLOGY & ADDITIONAL TESTS: Reviewed.    MEDICATIONS  (STANDING):  acetaminophen   Tablet .. 975 milliGRAM(s) Oral every 8 hours  allopurinol 100 milliGRAM(s) Oral daily  aspirin enteric coated 81 milliGRAM(s) Oral every 24 hours  atorvastatin 20 milliGRAM(s) Oral at bedtime  azithromycin   Tablet 250 milliGRAM(s) Oral every 24 hours  dextrose 5%. 1000 milliLiter(s) (50 mL/Hr) IV Continuous <Continuous>  dextrose 50% Injectable 12.5 Gram(s) IV Push once  dextrose 50% Injectable 25 Gram(s) IV Push once  dextrose 50% Injectable 25 Gram(s) IV Push once  doxazosin 8 milliGRAM(s) Oral at bedtime  heparin  Injectable 5000 Unit(s) SubCutaneous every 8 hours  hydroxychloroquine 200 milliGRAM(s) Oral every 12 hours  insulin lispro (HumaLOG) corrective regimen sliding scale   SubCutaneous Before meals and at bedtime  montelukast 10 milliGRAM(s) Oral every 24 hours  PPD  5 Tuberculin Unit(s) Injectable 5 Unit(s) IntraDermal once    MEDICATIONS  (PRN):  dextrose 40% Gel 15 Gram(s) Oral once PRN Blood Glucose LESS THAN 70 milliGRAM(s)/deciliter  glucagon  Injectable 1 milliGRAM(s) IntraMuscular once PRN Glucose LESS THAN 70 milligrams/deciliter      Allergies    penicillin (Unknown)  sulfa drugs (Unknown)    Intolerances

## 2020-04-03 NOTE — PROGRESS NOTE ADULT - ASSESSMENT
A 84 years old M with PMH of IDDM, HTN, HLD, CKD stage 4, sleep apnea (has CPAP machine), gout, Paget's s/p Reclast in 6/2014. 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 and verrucous acanthoma, presented from his nephrologist office (Dr. Valentin) because of fatigue and poor appetite and malaise w/ apparent increased creatinine in past week w/ productive cough.   COVID+ and patient was started on triple therapy (holding ascorbic acid since patient is ESRD).    # GRACIE on worsening CKD 5 in setting of COVID infection  - Pt with hx of CKD presented with BUN oBUN 114, Cr 6.73, (unknown baseline).   - HD initiated 4/2/20 via R IJ temp catheter   - planned for 2nd HD session today  - daily weight  - renal diet  - strict I/O  - Volume and electrolytes noted       # HTN  - Resume home meds  - UF with HD    # Renal bone disease  - send phos/pth/vit D

## 2020-04-03 NOTE — PROGRESS NOTE ADULT - PROBLEM SELECTOR PLAN 5
Pt on lasix 20 bid, norvasc 10 and doxazosin at home. currently BP is around 160    - Hold norvasc 10 daily  - resumed doxazosin 8 daily  - hold lasix

## 2020-04-03 NOTE — PROGRESS NOTE ADULT - PROBLEM SELECTOR PLAN 4
Pt with IDDM with lantus 24 units and pranding TID at home. 4/3 FS 51, holding lantus 20 hs now  - Moderate dose sliding scale  - f/u A1c in AM

## 2020-04-04 LAB
ALBUMIN SERPL ELPH-MCNC: 2.4 G/DL — LOW (ref 3.3–5)
ALP SERPL-CCNC: 70 U/L — SIGNIFICANT CHANGE UP (ref 40–120)
ALT FLD-CCNC: 73 U/L — HIGH (ref 10–45)
ANION GAP SERPL CALC-SCNC: 15 MMOL/L — SIGNIFICANT CHANGE UP (ref 5–17)
AST SERPL-CCNC: 69 U/L — HIGH (ref 10–40)
BASOPHILS # BLD AUTO: 0.01 K/UL — SIGNIFICANT CHANGE UP (ref 0–0.2)
BASOPHILS NFR BLD AUTO: 0.1 % — SIGNIFICANT CHANGE UP (ref 0–2)
BILIRUB SERPL-MCNC: 0.5 MG/DL — SIGNIFICANT CHANGE UP (ref 0.2–1.2)
BUN SERPL-MCNC: 65 MG/DL — HIGH (ref 7–23)
CALCIUM SERPL-MCNC: 8.7 MG/DL — SIGNIFICANT CHANGE UP (ref 8.4–10.5)
CHLORIDE SERPL-SCNC: 102 MMOL/L — SIGNIFICANT CHANGE UP (ref 96–108)
CO2 SERPL-SCNC: 23 MMOL/L — SIGNIFICANT CHANGE UP (ref 22–31)
CREAT SERPL-MCNC: 5.52 MG/DL — HIGH (ref 0.5–1.3)
CRP SERPL-MCNC: 16.46 MG/DL — HIGH (ref 0–0.4)
D DIMER BLD IA.RAPID-MCNC: 185 NG/ML DDU — SIGNIFICANT CHANGE UP
EOSINOPHIL # BLD AUTO: 0.04 K/UL — SIGNIFICANT CHANGE UP (ref 0–0.5)
EOSINOPHIL NFR BLD AUTO: 0.4 % — SIGNIFICANT CHANGE UP (ref 0–6)
FERRITIN SERPL-MCNC: 2394 NG/ML — HIGH (ref 30–400)
GLUCOSE BLDC GLUCOMTR-MCNC: 122 MG/DL — HIGH (ref 70–99)
GLUCOSE BLDC GLUCOMTR-MCNC: 145 MG/DL — HIGH (ref 70–99)
GLUCOSE BLDC GLUCOMTR-MCNC: 157 MG/DL — HIGH (ref 70–99)
GLUCOSE BLDC GLUCOMTR-MCNC: 159 MG/DL — HIGH (ref 70–99)
GLUCOSE BLDC GLUCOMTR-MCNC: 168 MG/DL — HIGH (ref 70–99)
GLUCOSE SERPL-MCNC: 130 MG/DL — HIGH (ref 70–99)
HCT VFR BLD CALC: 30.7 % — LOW (ref 39–50)
HGB BLD-MCNC: 10.2 G/DL — LOW (ref 13–17)
IMM GRANULOCYTES NFR BLD AUTO: 1.5 % — SIGNIFICANT CHANGE UP (ref 0–1.5)
LYMPHOCYTES # BLD AUTO: 0.37 K/UL — LOW (ref 1–3.3)
LYMPHOCYTES # BLD AUTO: 3.3 % — LOW (ref 13–44)
MAGNESIUM SERPL-MCNC: 2.3 MG/DL — SIGNIFICANT CHANGE UP (ref 1.6–2.6)
MCHC RBC-ENTMCNC: 33 PG — SIGNIFICANT CHANGE UP (ref 27–34)
MCHC RBC-ENTMCNC: 33.2 GM/DL — SIGNIFICANT CHANGE UP (ref 32–36)
MCV RBC AUTO: 99.4 FL — SIGNIFICANT CHANGE UP (ref 80–100)
MONOCYTES # BLD AUTO: 0.34 K/UL — SIGNIFICANT CHANGE UP (ref 0–0.9)
MONOCYTES NFR BLD AUTO: 3.1 % — SIGNIFICANT CHANGE UP (ref 2–14)
NEUTROPHILS # BLD AUTO: 10.13 K/UL — HIGH (ref 1.8–7.4)
NEUTROPHILS NFR BLD AUTO: 91.6 % — HIGH (ref 43–77)
NRBC # BLD: 0 /100 WBCS — SIGNIFICANT CHANGE UP (ref 0–0)
PHOSPHATE SERPL-MCNC: 5.7 MG/DL — HIGH (ref 2.5–4.5)
PLATELET # BLD AUTO: 240 K/UL — SIGNIFICANT CHANGE UP (ref 150–400)
POTASSIUM SERPL-MCNC: 4.5 MMOL/L — SIGNIFICANT CHANGE UP (ref 3.5–5.3)
POTASSIUM SERPL-SCNC: 4.5 MMOL/L — SIGNIFICANT CHANGE UP (ref 3.5–5.3)
PROT SERPL-MCNC: 5.9 G/DL — LOW (ref 6–8.3)
RBC # BLD: 3.09 M/UL — LOW (ref 4.2–5.8)
RBC # FLD: 13.3 % — SIGNIFICANT CHANGE UP (ref 10.3–14.5)
SODIUM SERPL-SCNC: 140 MMOL/L — SIGNIFICANT CHANGE UP (ref 135–145)
WBC # BLD: 11.06 K/UL — HIGH (ref 3.8–10.5)
WBC # FLD AUTO: 11.06 K/UL — HIGH (ref 3.8–10.5)

## 2020-04-04 PROCEDURE — 99233 SBSQ HOSP IP/OBS HIGH 50: CPT

## 2020-04-04 PROCEDURE — 99233 SBSQ HOSP IP/OBS HIGH 50: CPT | Mod: GC

## 2020-04-04 RX ADMIN — Medication 200 MILLIGRAM(S): at 05:57

## 2020-04-04 RX ADMIN — HEPARIN SODIUM 5000 UNIT(S): 5000 INJECTION INTRAVENOUS; SUBCUTANEOUS at 05:57

## 2020-04-04 RX ADMIN — FAMOTIDINE 20 MILLIGRAM(S): 10 INJECTION INTRAVENOUS at 05:58

## 2020-04-04 RX ADMIN — HEPARIN SODIUM 5000 UNIT(S): 5000 INJECTION INTRAVENOUS; SUBCUTANEOUS at 13:30

## 2020-04-04 RX ADMIN — Medication 975 MILLIGRAM(S): at 18:22

## 2020-04-04 RX ADMIN — Medication 200 MILLIGRAM(S): at 18:22

## 2020-04-04 RX ADMIN — Medication 81 MILLIGRAM(S): at 23:18

## 2020-04-04 RX ADMIN — Medication 975 MILLIGRAM(S): at 13:30

## 2020-04-04 RX ADMIN — Medication 975 MILLIGRAM(S): at 05:58

## 2020-04-04 RX ADMIN — HEPARIN SODIUM 5000 UNIT(S): 5000 INJECTION INTRAVENOUS; SUBCUTANEOUS at 23:19

## 2020-04-04 RX ADMIN — ATORVASTATIN CALCIUM 20 MILLIGRAM(S): 80 TABLET, FILM COATED ORAL at 23:17

## 2020-04-04 RX ADMIN — Medication 100 MILLIGRAM(S): at 13:30

## 2020-04-04 RX ADMIN — AZITHROMYCIN 250 MILLIGRAM(S): 500 TABLET, FILM COATED ORAL at 18:22

## 2020-04-04 RX ADMIN — Medication 2: at 18:29

## 2020-04-04 RX ADMIN — Medication 8 MILLIGRAM(S): at 23:21

## 2020-04-04 RX ADMIN — Medication 2: at 23:18

## 2020-04-04 RX ADMIN — MONTELUKAST 10 MILLIGRAM(S): 4 TABLET, CHEWABLE ORAL at 18:23

## 2020-04-04 NOTE — PROGRESS NOTE ADULT - SUBJECTIVE AND OBJECTIVE BOX
CC: ESRD    INTERVAL HISTORY:    * Dialyzed yesterday. * Anemia stable. * BP controlled.     ROS: By current guidelines, to minimize exposure and to preserve PPE, the ROS has been deferred and I will rely upon the ROS of the primary team.     PAST MEDICAL & SURGICAL HISTORY:  Gout  HLD (hyperlipidemia)  HTN (hypertension)  HTN, age 0-18  CKD (chronic kidney disease)  DM (diabetes mellitus)    PHYSICAL EXAM:  T(C): 36.9 (2020 14:29), Max: 36.9 (2020 14:29)  HR: 71 (2020 19:06)  BP: 129/58 (2020 19:06) (108/58 - 129/58)  RR: 30 (2020 19:06)  SpO2: 90% (2020 19:06)      2020 07:01  -  2020 07:00  --------------------------------------------------------  IN:  Total IN: 0 mL    OUT:    Indwelling Catheter - Urethral: 100 mL    Other: 1000 mL  Total OUT: 1100 mL    Total NET: -1100 mL      2020 07:01  -  2020 23:57  --------------------------------------------------------  IN:    Oral Fluid: 180 mL  Total IN: 180 mL    OUT:  Total OUT: 0 mL    Total NET: 180 mL        Weight 82.2 (2020 16:26)    By current guidelines, to minimize exposure and to preserve PPE, the physical exam has been deferred and I will rely upon the exam of the primary team.     MEDICATIONS  (STANDING):  acetaminophen   Tablet .. 975 milliGRAM(s) Oral every 8 hours  allopurinol 100 milliGRAM(s) Oral daily  aspirin enteric coated 81 milliGRAM(s) Oral every 24 hours  atorvastatin 20 milliGRAM(s) Oral at bedtime  azithromycin   Tablet 250 milliGRAM(s) Oral every 24 hours  dextrose 5%. 1000 milliLiter(s) (50 mL/Hr) IV Continuous <Continuous>  dextrose 50% Injectable 12.5 Gram(s) IV Push once  dextrose 50% Injectable 25 Gram(s) IV Push once  dextrose 50% Injectable 25 Gram(s) IV Push once  doxazosin 8 milliGRAM(s) Oral at bedtime  famotidine    Tablet 20 milliGRAM(s) Oral every 48 hours  heparin  Injectable 5000 Unit(s) SubCutaneous every 8 hours  hydroxychloroquine 200 milliGRAM(s) Oral every 12 hours  insulin lispro (HumaLOG) corrective regimen sliding scale   SubCutaneous Before meals and at bedtime  montelukast 10 milliGRAM(s) Oral every 24 hours  PPD  5 Tuberculin Unit(s) Injectable 5 Unit(s) IntraDermal once    MEDICATIONS  (PRN):  dextrose 40% Gel 15 Gram(s) Oral once PRN Blood Glucose LESS THAN 70 milliGRAM(s)/deciliter  glucagon  Injectable 1 milliGRAM(s) IntraMuscular once PRN Glucose LESS THAN 70 milligrams/deciliter    DATA:  140    |  102    |  65<H>  ----------------------------<  130<H>  Ca:8.7   (2020 06:13)  4.5     |  23     |  5.52<H>      eGFR if Non : 9 <L>  eGFR if : 10 <L>    TPro  5.9<L>  /  Alb  2.4<L>  /  TBili  0.5    /  DBili  x      /  AST  69<H>  /  ALT  73<H>  /  AlkPhos  70     2020 06:13    SCr 5.52 [2020 06:13]  SCr 6.42 [2020 07:34]  SCr 7.08 [2020 08:16]  SCr 6.73 [2020 16:47]                          10.2<L>  11.06<H> )-----------( 240      ( 2020 06:13 )             30.7<L>    Phos:5.7 mg/dL<H> M.3 mg/dL PTH:-- Uric acid:-- Serum Osm:--  Ferritin:2394 ng/mL<H> Iron:-- TIBC:-- Tsat:--  B12:-- TSH:-- (2020 06:13)    Urinalysis Basic - ( 2020 17:41 )  Color: Yellow / Appearance: SL Cloudy<!> / S.025 / pH: x  Gluc: x / Ketone: NEGATIVE  / Bili: Negative / Urobili: 0.2 E.U./dL   Blood: x / Protein: 100 mg/dL<!> / Nitrite: NEGATIVE   Leuk Esterase: Trace<!> / RBC: < 5 /HPF / WBC < 5 /HPF   Sq Epi: x / Non Sq Epi: 0-5 /HPF / Bacteria: Present /HPF<!>

## 2020-04-04 NOTE — PROGRESS NOTE ADULT - PROBLEM SELECTOR PLAN 4
Pt with IDDM with lantus 24 units and pranding TID at home. 4/3 FS 51, holding lantus 20 hs now  - Moderate dose sliding scale  - A1c 5.7

## 2020-04-04 NOTE — PROGRESS NOTE ADULT - PROBLEM SELECTOR PLAN 5
Pt on lasix 20 bid, norvasc 10 and doxazosin at home. currently BP is around 160  - Hold norvasc 10 daily  - hold lasix  - resumed doxazosin 8 daily

## 2020-04-04 NOTE — PROGRESS NOTE ADULT - PROBLEM SELECTOR PLAN 3
Pt with hx of CKD now presented with BUN oBUN 114, Cr 6.73, (unknown baseline).   - renal is on board--> HD initiated 4/2/20 via R IJ temp catheter   - monitor kidney function   - hold lasix for now  - d/c'd [place], Trial of void?

## 2020-04-04 NOTE — PROGRESS NOTE ADULT - PROBLEM SELECTOR PLAN 2
Likely 2/2 to COVID  -CXR showed Bilateral patchy/hazy opacities with a mid/lower lung zone   -COVID treatment initiated, as above  -c/w NRB or nasal cannula  for now  -monitor for hypoxemia

## 2020-04-04 NOTE — PROGRESS NOTE ADULT - PROBLEM SELECTOR PLAN 1
- Continue azithromycin 250 for 5 days total  - Continue on plaquenil for 5 days (400 bid for the first day and 200 bid for the rest of the duration)  - Daily d-dimer, CRP and ferritin  - Pending G6PD

## 2020-04-04 NOTE — PROGRESS NOTE ADULT - SUBJECTIVE AND OBJECTIVE BOX
INTERVAL HPI/OVERNIGHT EVENTS:  No ROSENDO overnight. Patient is afebrile, satting 95% on NRB. Patient received dialysis yesterday, Cr is improving. CRP, ferritin are elevated and uptrending. D-dimer is stable.     SUBJECTIVE: Patient seen and examined at bedside.    OBJECTIVE:    VITAL SIGNS:  ICU Vital Signs Last 24 Hrs  T(C): 36.6 (04 Apr 2020 09:52), Max: 37.1 (03 Apr 2020 17:45)  T(F): 97.9 (04 Apr 2020 09:52), Max: 98.7 (03 Apr 2020 17:45)  HR: 63 (04 Apr 2020 09:11) (62 - 93)  BP: 108/58 (04 Apr 2020 09:11) (99/53 - 115/56)  BP(mean): 78 (04 Apr 2020 05:58) (78 - 84)  ABP: --  ABP(mean): --  RR: 30 (04 Apr 2020 09:11) (18 - 30)  SpO2: 91% (04 Apr 2020 09:11) (91% - 94%)        04-03 @ 07:01  -  04-04 @ 07:00  --------------------------------------------------------  IN: 0 mL / OUT: 1100 mL / NET: -1100 mL    04-04 @ 07:01  -  04-04 @ 12:56  --------------------------------------------------------  IN: 120 mL / OUT: 0 mL / NET: 120 mL      CAPILLARY BLOOD GLUCOSE      POCT Blood Glucose.: 157 mg/dL (04 Apr 2020 11:09)      PHYSICAL EXAM:    General: NAD, disheveled  HEENT: NC/AT; PERRL, clear conjunctiva  Neck: supple  Respiratory: Tachypneic, comfortable non labored breathing  Cardiovascular: +S1/S2; RRR  Abdomen: soft, NT/ND; +BS x4  Extremities: WWP, 2+ peripheral pulses b/l; no LE edema  Skin: normal color and turgor; no rash      MEDICATIONS:  MEDICATIONS  (STANDING):  acetaminophen   Tablet .. 975 milliGRAM(s) Oral every 8 hours  allopurinol 100 milliGRAM(s) Oral daily  aspirin enteric coated 81 milliGRAM(s) Oral every 24 hours  atorvastatin 20 milliGRAM(s) Oral at bedtime  azithromycin   Tablet 250 milliGRAM(s) Oral every 24 hours  dextrose 5%. 1000 milliLiter(s) (50 mL/Hr) IV Continuous <Continuous>  dextrose 50% Injectable 12.5 Gram(s) IV Push once  dextrose 50% Injectable 25 Gram(s) IV Push once  dextrose 50% Injectable 25 Gram(s) IV Push once  doxazosin 8 milliGRAM(s) Oral at bedtime  famotidine    Tablet 20 milliGRAM(s) Oral every 48 hours  heparin  Injectable 5000 Unit(s) SubCutaneous every 8 hours  hydroxychloroquine 200 milliGRAM(s) Oral every 12 hours  insulin lispro (HumaLOG) corrective regimen sliding scale   SubCutaneous Before meals and at bedtime  montelukast 10 milliGRAM(s) Oral every 24 hours  PPD  5 Tuberculin Unit(s) Injectable 5 Unit(s) IntraDermal once    MEDICATIONS  (PRN):  dextrose 40% Gel 15 Gram(s) Oral once PRN Blood Glucose LESS THAN 70 milliGRAM(s)/deciliter  glucagon  Injectable 1 milliGRAM(s) IntraMuscular once PRN Glucose LESS THAN 70 milligrams/deciliter      ALLERGIES:  Allergies    penicillin (Unknown)  sulfa drugs (Unknown)    Intolerances        LABS:                        10.2   11.06 )-----------( 240      ( 04 Apr 2020 06:13 )             30.7     04-04    140  |  102  |  65<H>  ----------------------------<  130<H>  4.5   |  23  |  5.52<H>    Ca    8.7      04 Apr 2020 06:13  Phos  5.7     04-04  Mg     2.3     04-04    TPro  5.9<L>  /  Alb  2.4<L>  /  TBili  0.5  /  DBili  x   /  AST  69<H>  /  ALT  73<H>  /  AlkPhos  70  04-04          Daily COVID labs  Procalcitonin: Procalcitonin, Serum: 0.38 ng/mL (04-01-20 @ 16:47)    D-dimer: D-Dimer Assay, Quantitative: 185 ng/mL DDU (04-04-20 @ 06:13)  D-Dimer Assay, Quantitative: 186 ng/mL DDU (04-03-20 @ 07:34)  D-Dimer Assay, Quantitative: 183 ng/mL DDU (04-02-20 @ 08:16)  D-Dimer Assay, Quantitative: 189 ng/mL DDU (04-01-20 @ 16:47)    ESR: Sedimentation Rate, Erythrocyte: 100 mm/Hr (04-02-20 @ 08:16)    CRP: C-Reactive Protein, Serum: 16.46 mg/dL (04-04-20 @ 06:13)  C-Reactive Protein, Serum: 15.10 mg/dL (04-03-20 @ 07:34)  C-Reactive Protein, Serum: 8.96 mg/dL (04-02-20 @ 08:16)  C-Reactive Protein, Serum: 7.85 mg/dL (04-01-20 @ 16:47)    LDH: Lactate Dehydrogenase, Serum: 493 U/L (04-01-20 @ 16:47)    Ferritin: Ferritin, Serum: 2394 ng/mL (04-04-20 @ 06:13)  Ferritin, Serum: 2140 ng/mL (04-03-20 @ 07:34)  Ferritin, Serum: 1862 ng/mL (04-02-20 @ 08:16)  Ferritin, Serum: 1737 ng/mL (04-01-20 @ 16:47)    Lactate: lc  Trop I:   Ck: Creatine Kinase, Serum: 288 U/L (04-01-20 @ 16:47)      Admission COVID Labs  Full T cell subset:   G6PD:   Immunoglobulins panel:   Quantiferon Gold Tb:   Triglyceride level:         Culture - Blood (collected 04-01-20 @ 19:36)  Source: .Blood Blood  Preliminary Report (04-03-20 @ 20:00):    No growth at 2 days.    Culture - Blood (collected 04-01-20 @ 19:36)  Source: .Blood Blood  Preliminary Report (04-03-20 @ 20:00):    No growth at 2 days.        RADIOLOGY & ADDITIONAL TESTS: Reviewed.

## 2020-04-04 NOTE — PROGRESS NOTE ADULT - ASSESSMENT
84 year old with CKD, admitted with Covid. Started on dialysis for GRACIE. Borderline hypotension. Anemia.    Suggest:    1. Cont HD.  2. Follow BP. Doxazosin could be contributing to hypotension. Consider lowering dose. (May be needed for prostate.)  3. Retacrit 10,000 U sq weekly if HGB < 10.    Please call with any questions.    Presley Payne MD, FACP, FASN | kidney.[facility]  Nephrology, Hypertension, and Internal Medicine  Mobile: [phone] (Daytime Hours Only)  Office/Answering Service: [phone]  Asst. Prof. of Medicine, [facility] [employer] at [facility] - Nephrology at [address]  [address], [address], [place], [place]

## 2020-04-05 DIAGNOSIS — Z29.9 ENCOUNTER FOR PROPHYLACTIC MEASURES, UNSPECIFIED: ICD-10-CM

## 2020-04-05 LAB
ALBUMIN SERPL ELPH-MCNC: 2.6 G/DL — LOW (ref 3.3–5)
ALP SERPL-CCNC: 76 U/L — SIGNIFICANT CHANGE UP (ref 40–120)
ALT FLD-CCNC: 68 U/L — HIGH (ref 10–45)
ANION GAP SERPL CALC-SCNC: 14 MMOL/L — SIGNIFICANT CHANGE UP (ref 5–17)
AST SERPL-CCNC: 58 U/L — HIGH (ref 10–40)
BASOPHILS # BLD AUTO: 0 K/UL — SIGNIFICANT CHANGE UP (ref 0–0.2)
BASOPHILS NFR BLD AUTO: 0 % — SIGNIFICANT CHANGE UP (ref 0–2)
BILIRUB SERPL-MCNC: 0.4 MG/DL — SIGNIFICANT CHANGE UP (ref 0.2–1.2)
BUN SERPL-MCNC: 93 MG/DL — HIGH (ref 7–23)
CALCIUM SERPL-MCNC: 9 MG/DL — SIGNIFICANT CHANGE UP (ref 8.4–10.5)
CHLORIDE SERPL-SCNC: 102 MMOL/L — SIGNIFICANT CHANGE UP (ref 96–108)
CO2 SERPL-SCNC: 23 MMOL/L — SIGNIFICANT CHANGE UP (ref 22–31)
CREAT SERPL-MCNC: 7.45 MG/DL — HIGH (ref 0.5–1.3)
CRP SERPL-MCNC: 12.79 MG/DL — HIGH (ref 0–0.4)
D DIMER BLD IA.RAPID-MCNC: 244 NG/ML DDU — HIGH
EOSINOPHIL # BLD AUTO: 0.11 K/UL — SIGNIFICANT CHANGE UP (ref 0–0.5)
EOSINOPHIL NFR BLD AUTO: 1.3 % — SIGNIFICANT CHANGE UP (ref 0–6)
FERRITIN SERPL-MCNC: 2575 NG/ML — HIGH (ref 30–400)
GLUCOSE BLDC GLUCOMTR-MCNC: 132 MG/DL — HIGH (ref 70–99)
GLUCOSE BLDC GLUCOMTR-MCNC: 139 MG/DL — HIGH (ref 70–99)
GLUCOSE BLDC GLUCOMTR-MCNC: 143 MG/DL — HIGH (ref 70–99)
GLUCOSE BLDC GLUCOMTR-MCNC: 146 MG/DL — HIGH (ref 70–99)
GLUCOSE SERPL-MCNC: 141 MG/DL — HIGH (ref 70–99)
HCT VFR BLD CALC: 30 % — LOW (ref 39–50)
HGB BLD-MCNC: 9.6 G/DL — LOW (ref 13–17)
IMM GRANULOCYTES NFR BLD AUTO: 1.8 % — HIGH (ref 0–1.5)
LYMPHOCYTES # BLD AUTO: 0.4 K/UL — LOW (ref 1–3.3)
LYMPHOCYTES # BLD AUTO: 4.7 % — LOW (ref 13–44)
MAGNESIUM SERPL-MCNC: 2.7 MG/DL — HIGH (ref 1.6–2.6)
MCHC RBC-ENTMCNC: 32 GM/DL — SIGNIFICANT CHANGE UP (ref 32–36)
MCHC RBC-ENTMCNC: 32.5 PG — SIGNIFICANT CHANGE UP (ref 27–34)
MCV RBC AUTO: 101.7 FL — HIGH (ref 80–100)
MONOCYTES # BLD AUTO: 0.3 K/UL — SIGNIFICANT CHANGE UP (ref 0–0.9)
MONOCYTES NFR BLD AUTO: 3.5 % — SIGNIFICANT CHANGE UP (ref 2–14)
NEUTROPHILS # BLD AUTO: 7.56 K/UL — HIGH (ref 1.8–7.4)
NEUTROPHILS NFR BLD AUTO: 88.7 % — HIGH (ref 43–77)
NRBC # BLD: 0 /100 WBCS — SIGNIFICANT CHANGE UP (ref 0–0)
PHOSPHATE SERPL-MCNC: 7.9 MG/DL — HIGH (ref 2.5–4.5)
PLATELET # BLD AUTO: 251 K/UL — SIGNIFICANT CHANGE UP (ref 150–400)
POTASSIUM SERPL-MCNC: 4.7 MMOL/L — SIGNIFICANT CHANGE UP (ref 3.5–5.3)
POTASSIUM SERPL-SCNC: 4.7 MMOL/L — SIGNIFICANT CHANGE UP (ref 3.5–5.3)
PROT SERPL-MCNC: 6.1 G/DL — SIGNIFICANT CHANGE UP (ref 6–8.3)
RBC # BLD: 2.95 M/UL — LOW (ref 4.2–5.8)
RBC # FLD: 13.6 % — SIGNIFICANT CHANGE UP (ref 10.3–14.5)
SODIUM SERPL-SCNC: 139 MMOL/L — SIGNIFICANT CHANGE UP (ref 135–145)
WBC # BLD: 8.52 K/UL — SIGNIFICANT CHANGE UP (ref 3.8–10.5)
WBC # FLD AUTO: 8.52 K/UL — SIGNIFICANT CHANGE UP (ref 3.8–10.5)

## 2020-04-05 PROCEDURE — 99232 SBSQ HOSP IP/OBS MODERATE 35: CPT

## 2020-04-05 PROCEDURE — 99233 SBSQ HOSP IP/OBS HIGH 50: CPT | Mod: GC

## 2020-04-05 RX ORDER — ERYTHROPOIETIN 10000 [IU]/ML
10000 INJECTION, SOLUTION INTRAVENOUS; SUBCUTANEOUS ONCE
Refills: 0 | Status: DISCONTINUED | OUTPATIENT
Start: 2020-04-05 | End: 2020-04-05

## 2020-04-05 RX ORDER — SODIUM ZIRCONIUM CYCLOSILICATE 10 G/10G
10 POWDER, FOR SUSPENSION ORAL DAILY
Refills: 0 | Status: DISCONTINUED | OUTPATIENT
Start: 2020-04-05 | End: 2020-04-13

## 2020-04-05 RX ORDER — ERYTHROPOIETIN 10000 [IU]/ML
10000 INJECTION, SOLUTION INTRAVENOUS; SUBCUTANEOUS ONCE
Refills: 0 | Status: COMPLETED | OUTPATIENT
Start: 2020-04-05 | End: 2020-04-05

## 2020-04-05 RX ADMIN — Medication 200 MILLIGRAM(S): at 18:45

## 2020-04-05 RX ADMIN — Medication 975 MILLIGRAM(S): at 12:11

## 2020-04-05 RX ADMIN — ATORVASTATIN CALCIUM 20 MILLIGRAM(S): 80 TABLET, FILM COATED ORAL at 21:15

## 2020-04-05 RX ADMIN — Medication 200 MILLIGRAM(S): at 06:40

## 2020-04-05 RX ADMIN — AZITHROMYCIN 250 MILLIGRAM(S): 500 TABLET, FILM COATED ORAL at 18:44

## 2020-04-05 RX ADMIN — HEPARIN SODIUM 5000 UNIT(S): 5000 INJECTION INTRAVENOUS; SUBCUTANEOUS at 06:39

## 2020-04-05 RX ADMIN — HEPARIN SODIUM 5000 UNIT(S): 5000 INJECTION INTRAVENOUS; SUBCUTANEOUS at 21:15

## 2020-04-05 RX ADMIN — Medication 81 MILLIGRAM(S): at 18:44

## 2020-04-05 RX ADMIN — Medication 975 MILLIGRAM(S): at 18:45

## 2020-04-05 RX ADMIN — Medication 975 MILLIGRAM(S): at 06:39

## 2020-04-05 RX ADMIN — HEPARIN SODIUM 5000 UNIT(S): 5000 INJECTION INTRAVENOUS; SUBCUTANEOUS at 12:11

## 2020-04-05 RX ADMIN — Medication 8 MILLIGRAM(S): at 21:22

## 2020-04-05 RX ADMIN — MONTELUKAST 10 MILLIGRAM(S): 4 TABLET, CHEWABLE ORAL at 18:44

## 2020-04-05 RX ADMIN — ERYTHROPOIETIN 10000 UNIT(S): 10000 INJECTION, SOLUTION INTRAVENOUS; SUBCUTANEOUS at 21:15

## 2020-04-05 RX ADMIN — Medication 100 MILLIGRAM(S): at 12:11

## 2020-04-05 NOTE — PROGRESS NOTE ADULT - SUBJECTIVE AND OBJECTIVE BOX
*** NOTE IN PROGRESS ***    INTERVAL HPI/OVERNIGHT EVENTS:    SUBJECTIVE: Patient seen and examined at bedside.    OBJECTIVE:    VITAL SIGNS:  ICU Vital Signs Last 24 Hrs  T(C): 36.7 (05 Apr 2020 01:31), Max: 36.9 (04 Apr 2020 14:29)  T(F): 98 (05 Apr 2020 01:31), Max: 98.4 (04 Apr 2020 14:29)  HR: 60 (05 Apr 2020 01:31) (60 - 71)  BP: 95/50 (05 Apr 2020 01:31) (95/50 - 129/58)  BP(mean): --  ABP: --  ABP(mean): --  RR: 26 (05 Apr 2020 01:31) (26 - 32)  SpO2: 96% (05 Apr 2020 01:31) (83% - 96%)        04-03 @ 07:01  -  04-04 @ 07:00  --------------------------------------------------------  IN: 0 mL / OUT: 1100 mL / NET: -1100 mL    04-04 @ 07:01  -  04-05 @ 06:56  --------------------------------------------------------  IN: 350 mL / OUT: 0 mL / NET: 350 mL      CAPILLARY BLOOD GLUCOSE      POCT Blood Glucose.: 168 mg/dL (04 Apr 2020 22:20)      PHYSICAL EXAM:    General: NAD  HEENT: NC/AT; PERRL, clear conjunctiva  Neck: supple  Respiratory: CTA b/l  Cardiovascular: +S1/S2; RRR  Abdomen: soft, NT/ND; +BS x4  Extremities: WWP, 2+ peripheral pulses b/l; no LE edema  Skin: normal color and turgor; no rash  Neurological:     MEDICATIONS:  MEDICATIONS  (STANDING):  acetaminophen   Tablet .. 975 milliGRAM(s) Oral every 8 hours  allopurinol 100 milliGRAM(s) Oral daily  aspirin enteric coated 81 milliGRAM(s) Oral every 24 hours  atorvastatin 20 milliGRAM(s) Oral at bedtime  azithromycin   Tablet 250 milliGRAM(s) Oral every 24 hours  dextrose 5%. 1000 milliLiter(s) (50 mL/Hr) IV Continuous <Continuous>  dextrose 50% Injectable 12.5 Gram(s) IV Push once  dextrose 50% Injectable 25 Gram(s) IV Push once  dextrose 50% Injectable 25 Gram(s) IV Push once  doxazosin 8 milliGRAM(s) Oral at bedtime  famotidine    Tablet 20 milliGRAM(s) Oral every 48 hours  heparin  Injectable 5000 Unit(s) SubCutaneous every 8 hours  hydroxychloroquine 200 milliGRAM(s) Oral every 12 hours  insulin lispro (HumaLOG) corrective regimen sliding scale   SubCutaneous Before meals and at bedtime  montelukast 10 milliGRAM(s) Oral every 24 hours  PPD  5 Tuberculin Unit(s) Injectable 5 Unit(s) IntraDermal once    MEDICATIONS  (PRN):  dextrose 40% Gel 15 Gram(s) Oral once PRN Blood Glucose LESS THAN 70 milliGRAM(s)/deciliter  glucagon  Injectable 1 milliGRAM(s) IntraMuscular once PRN Glucose LESS THAN 70 milligrams/deciliter      ALLERGIES:  Allergies    penicillin (Unknown)  sulfa drugs (Unknown)    Intolerances        LABS:                        9.6    8.52  )-----------( 251      ( 05 Apr 2020 06:25 )             30.0     04-04    140  |  102  |  65<H>  ----------------------------<  130<H>  4.5   |  23  |  5.52<H>    Ca    8.7      04 Apr 2020 06:13  Phos  5.7     04-04  Mg     2.3     04-04    TPro  5.9<L>  /  Alb  2.4<L>  /  TBili  0.5  /  DBili  x   /  AST  69<H>  /  ALT  73<H>  /  AlkPhos  70  04-04          Procalcitonin:   D-dimer: D-Dimer Assay, Quantitative: 244 ng/mL DDU (04-05-20 @ 06:25)  D-Dimer Assay, Quantitative: 185 ng/mL DDU (04-04-20 @ 06:13)  D-Dimer Assay, Quantitative: 186 ng/mL DDU (04-03-20 @ 07:34)  D-Dimer Assay, Quantitative: 183 ng/mL DDU (04-02-20 @ 08:16)    ESR: Sedimentation Rate, Erythrocyte: 100 mm/Hr (04-02-20 @ 08:16)    CRP: C-Reactive Protein, Serum: 16.46 mg/dL (04-04-20 @ 06:13)  C-Reactive Protein, Serum: 15.10 mg/dL (04-03-20 @ 07:34)  C-Reactive Protein, Serum: 8.96 mg/dL (04-02-20 @ 08:16)    LDH:   Ferritin: Ferritin, Serum: 2394 ng/mL (04-04-20 @ 06:13)  Ferritin, Serum: 2140 ng/mL (04-03-20 @ 07:34)  Ferritin, Serum: 1862 ng/mL (04-02-20 @ 08:16)    Lactate: lc  Trop I:   Ck:       COVID Labs  Full T cell subset:   G6PD:   Immunoglobulins panel:   Quantiferon Gold Tb:   Triglyceride level:               RADIOLOGY & ADDITIONAL TESTS: Reviewed. INTERVAL HPI/OVERNIGHT EVENTS: No acute events overnight. PPD read as negative.    SUBJECTIVE: Patient seen and examined at bedside. No acute complaints.     OBJECTIVE:    VITAL SIGNS:  ICU Vital Signs Last 24 Hrs  T(C): 36.7 (05 Apr 2020 01:31), Max: 36.9 (04 Apr 2020 14:29)  T(F): 98 (05 Apr 2020 01:31), Max: 98.4 (04 Apr 2020 14:29)  HR: 60 (05 Apr 2020 01:31) (60 - 71)  BP: 95/50 (05 Apr 2020 01:31) (95/50 - 129/58)  BP(mean): --  ABP: --  ABP(mean): --  RR: 26 (05 Apr 2020 01:31) (26 - 32)  SpO2: 96% (05 Apr 2020 01:31) (83% - 96%)        04-03 @ 07:01  -  04-04 @ 07:00  --------------------------------------------------------  IN: 0 mL / OUT: 1100 mL / NET: -1100 mL    04-04 @ 07:01  -  04-05 @ 06:56  --------------------------------------------------------  IN: 350 mL / OUT: 0 mL / NET: 350 mL      CAPILLARY BLOOD GLUCOSE      POCT Blood Glucose.: 168 mg/dL (04 Apr 2020 22:20)      PHYSICAL EXAM:  General: NAD, disheveled  HEENT: NC/AT; PERRL, clear conjunctiva  Neck: supple  Respiratory: Tachypneic, comfortable non labored breathing  Cardiovascular: +S1/S2; RRR  Abdomen: soft, NT/ND; +BS x4  Extremities: WWP, 2+ peripheral pulses b/l; no LE edema  Skin: normal color and turgor; no rash  Neuro: AAOx2      MEDICATIONS:  MEDICATIONS  (STANDING):  acetaminophen   Tablet .. 975 milliGRAM(s) Oral every 8 hours  allopurinol 100 milliGRAM(s) Oral daily  aspirin enteric coated 81 milliGRAM(s) Oral every 24 hours  atorvastatin 20 milliGRAM(s) Oral at bedtime  azithromycin   Tablet 250 milliGRAM(s) Oral every 24 hours  dextrose 5%. 1000 milliLiter(s) (50 mL/Hr) IV Continuous <Continuous>  dextrose 50% Injectable 12.5 Gram(s) IV Push once  dextrose 50% Injectable 25 Gram(s) IV Push once  dextrose 50% Injectable 25 Gram(s) IV Push once  doxazosin 8 milliGRAM(s) Oral at bedtime  famotidine    Tablet 20 milliGRAM(s) Oral every 48 hours  heparin  Injectable 5000 Unit(s) SubCutaneous every 8 hours  hydroxychloroquine 200 milliGRAM(s) Oral every 12 hours  insulin lispro (HumaLOG) corrective regimen sliding scale   SubCutaneous Before meals and at bedtime  montelukast 10 milliGRAM(s) Oral every 24 hours  PPD  5 Tuberculin Unit(s) Injectable 5 Unit(s) IntraDermal once    MEDICATIONS  (PRN):  dextrose 40% Gel 15 Gram(s) Oral once PRN Blood Glucose LESS THAN 70 milliGRAM(s)/deciliter  glucagon  Injectable 1 milliGRAM(s) IntraMuscular once PRN Glucose LESS THAN 70 milligrams/deciliter      ALLERGIES:  Allergies    penicillin (Unknown)  sulfa drugs (Unknown)    Intolerances        LABS:                        9.6    8.52  )-----------( 251      ( 05 Apr 2020 06:25 )             30.0     04-04    140  |  102  |  65<H>  ----------------------------<  130<H>  4.5   |  23  |  5.52<H>    Ca    8.7      04 Apr 2020 06:13  Phos  5.7     04-04  Mg     2.3     04-04    TPro  5.9<L>  /  Alb  2.4<L>  /  TBili  0.5  /  DBili  x   /  AST  69<H>  /  ALT  73<H>  /  AlkPhos  70  04-04          Procalcitonin:   D-dimer: D-Dimer Assay, Quantitative: 244 ng/mL DDU (04-05-20 @ 06:25)  D-Dimer Assay, Quantitative: 185 ng/mL DDU (04-04-20 @ 06:13)  D-Dimer Assay, Quantitative: 186 ng/mL DDU (04-03-20 @ 07:34)  D-Dimer Assay, Quantitative: 183 ng/mL DDU (04-02-20 @ 08:16)    ESR: Sedimentation Rate, Erythrocyte: 100 mm/Hr (04-02-20 @ 08:16)    CRP: C-Reactive Protein, Serum: 16.46 mg/dL (04-04-20 @ 06:13)  C-Reactive Protein, Serum: 15.10 mg/dL (04-03-20 @ 07:34)  C-Reactive Protein, Serum: 8.96 mg/dL (04-02-20 @ 08:16)    LDH:   Ferritin: Ferritin, Serum: 2394 ng/mL (04-04-20 @ 06:13)  Ferritin, Serum: 2140 ng/mL (04-03-20 @ 07:34)  Ferritin, Serum: 1862 ng/mL (04-02-20 @ 08:16)    Lactate: lc  Trop I:   Ck:       COVID Labs  Full T cell subset:   G6PD:   Immunoglobulins panel:   Quantiferon Gold Tb:   Triglyceride level:               RADIOLOGY & ADDITIONAL TESTS: Reviewed.

## 2020-04-05 NOTE — PROGRESS NOTE ADULT - ASSESSMENT
84 year old with CKD, admitted with Covid. Started on dialysis for GRACIE. Borderline hypotension. Anemia.    Suggest:    1. Follow BP. Doxazosin could be contributing to hypotension. Consider lowering dose. (May be needed for prostate.)  2. Retacrit 10,000 U sq weekly for anemia.    * Dialysis resources are critically limited during the Covid-19 public health crisis.  * Patients will not be dialyzed on a regular schedule. Need for dialysis will be reassessed daily.   * To control potassium, please ensure all dialysis patients with K > 4 are on Lokelma 10 grams daily.  * Please call us with any hyperkalemia (K > 5.5 - 6) or worsening respiratory failure that is primarily due to fluid overload. Please treat hyperkalemia aggressively (with lokelma instead of kayexalate; and glucose/insulin, lasix 120 mg IV, albuterol, and calcium gluconate when appropriate).  * Please fluid restrict patient to 1L and use intravenous diuretics for fluid overload, when possible.    Please call with any questions.    Presley Payne MD, FACP, FASN | kidney.[facility]  Nephrology, Hypertension, and Internal Medicine  Mobile: [phone] (Daytime Hours Only)  Office/Answering Service: [phone]  Asst. Prof. of Medicine, [facility] [employer] at [facility] - Nephrology at [address]  [address], [address], [place], [place]

## 2020-04-05 NOTE — PROGRESS NOTE ADULT - SUBJECTIVE AND OBJECTIVE BOX
CC: ESRD    INTERVAL HISTORY:    * Dialyzed 2 days ago. * Potassium controlled. * BP controlled.     ROS: By current guidelines, to minimize exposure and to preserve PPE, the ROS has been deferred and I will rely upon the ROS of the primary team.     PAST MEDICAL & SURGICAL HISTORY:  Gout  HLD (hyperlipidemia)  HTN (hypertension)  HTN, age 0-18  CKD (chronic kidney disease)  DM (diabetes mellitus)    PHYSICAL EXAM:  T(C): 36.1 (2020 12:12), Max: 37 (2020 07:34)  HR: 75 (2020 21:23)  BP: 108/62 (2020 21:23) (95/50 - 116/61)  RR: 20 (2020 21:23)  SpO2: 92% (2020 21:23)      2020 07:01  -  2020 07:00  --------------------------------------------------------  IN:    Oral Fluid: 450 mL  Total IN: 450 mL    OUT:  Total OUT: 0 mL    Total NET: 450 mL        Weight 82.2 (2020 16:26)    By current guidelines, to minimize exposure and to preserve PPE, the physical exam has been deferred and I will rely upon the exam of the primary team.     MEDICATIONS  (STANDING):  acetaminophen   Tablet .. 975 milliGRAM(s) Oral every 8 hours  allopurinol 100 milliGRAM(s) Oral daily  aspirin enteric coated 81 milliGRAM(s) Oral every 24 hours  atorvastatin 20 milliGRAM(s) Oral at bedtime  dextrose 5%. 1000 milliLiter(s) (50 mL/Hr) IV Continuous <Continuous>  dextrose 50% Injectable 12.5 Gram(s) IV Push once  dextrose 50% Injectable 25 Gram(s) IV Push once  dextrose 50% Injectable 25 Gram(s) IV Push once  doxazosin 8 milliGRAM(s) Oral at bedtime  famotidine    Tablet 20 milliGRAM(s) Oral every 48 hours  heparin  Injectable 5000 Unit(s) SubCutaneous every 8 hours  hydroxychloroquine 200 milliGRAM(s) Oral every 12 hours  insulin lispro (HumaLOG) corrective regimen sliding scale   SubCutaneous Before meals and at bedtime  montelukast 10 milliGRAM(s) Oral every 24 hours  PPD  5 Tuberculin Unit(s) Injectable 5 Unit(s) IntraDermal once    MEDICATIONS  (PRN):  dextrose 40% Gel 15 Gram(s) Oral once PRN Blood Glucose LESS THAN 70 milliGRAM(s)/deciliter  glucagon  Injectable 1 milliGRAM(s) IntraMuscular once PRN Glucose LESS THAN 70 milligrams/deciliter    DATA:  139    |  102    |  93<H>  ----------------------------<  141<H>  Ca:9.0   (2020 06:25)  4.7     |  23     |  7.45<H>      eGFR if Non : 6 <L>  eGFR if : 7 <L>    TPro  6.1    /  Alb  2.6<L>  /  TBili  0.4    /  DBili  x      /  AST  58<H>  /  ALT  68<H>  /  AlkPhos  76     2020 06:25    SCr 7.45 [2020 06:25]  SCr 5.52 [2020 06:13]  SCr 6.42 [2020 07:34]  SCr 7.08 [2020 08:16]  SCr 6.73 [2020 16:47]                          9.6<L>  8.52  )-----------( 251      ( 2020 06:25 )             30.0<L>    Phos:7.9 mg/dL<H> M.7 mg/dL<H> PTH:-- Uric acid:-- Serum Osm:--  Ferritin:2575 ng/mL<H> Iron:-- TIBC:-- Tsat:--  B12:-- TSH:-- (2020 06:25)    Urinalysis Basic - ( 2020 17:41 )  Color: Yellow / Appearance: SL Cloudy<!> / S.025 / pH: x  Gluc: x / Ketone: NEGATIVE  / Bili: Negative / Urobili: 0.2 E.U./dL   Blood: x / Protein: 100 mg/dL<!> / Nitrite: NEGATIVE   Leuk Esterase: Trace<!> / RBC: < 5 /HPF / WBC < 5 /HPF   Sq Epi: x / Non Sq Epi: 0-5 /HPF / Bacteria: Present /HPF<!>

## 2020-04-05 NOTE — PROGRESS NOTE ADULT - PROBLEM SELECTOR PLAN 2
Likely 2/2 to COVID  -CXR showed Bilateral patchy/hazy opacities with a mid/lower lung zone   -COVID treatment initiated, as above  -c/w NRB or nasal cannula  for now  -monitor for hypoxemia Likely viral pneumonia 2/2 to COVID-19 infection  -CXR showed Bilateral patchy/hazy opacities with a mid/lower lung zone   -COVID treatment initiated, as above  -c/w NRB or nasal cannula  for now  -monitor for hypoxemia

## 2020-04-05 NOTE — PROGRESS NOTE ADULT - PROBLEM SELECTOR PLAN 8
Permission received to review discharge instructions and discuss private health information with Cheryl Babin, daughter. Fluids: s/p __ NS, no more IVF, encourage PO intake   Electrolytes-replete as needed  Nutrition - DASH/TLC/carb consistent   Code- Full Code  DVT ppx: HSQ   GI ppx: pepcid   DIspo: [id number] Fluids: none  Electrolytes-replete as needed per nephro  Nutrition - DASH/TLC/carb consistent

## 2020-04-05 NOTE — PROGRESS NOTE ADULT - PROBLEM SELECTOR PLAN 1
- Continue azithromycin 250 for 5 days total  - Continue on plaquenil for 5 days (400 bid for the first day and 200 bid for the rest of the duration)  - Daily d-dimer, CRP and ferritin  - Pending G6PD COVID 19 + (4/1)  - Continue azithromycin 250 for 5 days total, complete 4/6  - Continue on plaquenil for 5 days (400 bid for the first day and 200 bid for the rest of the duration), complete 4/6  - Daily COVID labs: d-dimer, CRP and ferritin  - Pending G6PD

## 2020-04-05 NOTE — PROGRESS NOTE ADULT - PROBLEM SELECTOR PLAN 3
Pt with hx of CKD now presented with BUN oBUN 114, Cr 6.73, (unknown baseline).   - renal is on board--> HD initiated 4/2/20 via R IJ temp catheter   - monitor kidney function   - hold lasix for now  - d/c'd [place], Trial of void? Pt with hx of CKD now presented with , Cr 6.73, (unknown baseline).   - renal is on board--> HD initiated 4/2/20 via R IJ temp catheter   - PPD read as negative 4/5  - Daily BMP  - HD per nephrology

## 2020-04-06 LAB
ALBUMIN SERPL ELPH-MCNC: 2.3 G/DL — LOW (ref 3.3–5)
ALP SERPL-CCNC: 82 U/L — SIGNIFICANT CHANGE UP (ref 40–120)
ALT FLD-CCNC: 63 U/L — HIGH (ref 10–45)
ANION GAP SERPL CALC-SCNC: 18 MMOL/L — HIGH (ref 5–17)
APTT BLD: 33.7 SEC — SIGNIFICANT CHANGE UP (ref 27.5–36.3)
AST SERPL-CCNC: 49 U/L — HIGH (ref 10–40)
BASOPHILS # BLD AUTO: 0 K/UL — SIGNIFICANT CHANGE UP (ref 0–0.2)
BASOPHILS NFR BLD AUTO: 0 % — SIGNIFICANT CHANGE UP (ref 0–2)
BILIRUB SERPL-MCNC: 0.3 MG/DL — SIGNIFICANT CHANGE UP (ref 0.2–1.2)
BUN SERPL-MCNC: 118 MG/DL — HIGH (ref 7–23)
CALCIUM SERPL-MCNC: 8.6 MG/DL — SIGNIFICANT CHANGE UP (ref 8.4–10.5)
CHLORIDE SERPL-SCNC: 103 MMOL/L — SIGNIFICANT CHANGE UP (ref 96–108)
CO2 SERPL-SCNC: 21 MMOL/L — LOW (ref 22–31)
CREAT SERPL-MCNC: 8.36 MG/DL — HIGH (ref 0.5–1.3)
CRP SERPL-MCNC: 10.58 MG/DL — HIGH (ref 0–0.4)
CULTURE RESULTS: NO GROWTH — SIGNIFICANT CHANGE UP
CULTURE RESULTS: NO GROWTH — SIGNIFICANT CHANGE UP
D DIMER BLD IA.RAPID-MCNC: 152 NG/ML DDU — SIGNIFICANT CHANGE UP
EOSINOPHIL # BLD AUTO: 0.14 K/UL — SIGNIFICANT CHANGE UP (ref 0–0.5)
EOSINOPHIL NFR BLD AUTO: 1.7 % — SIGNIFICANT CHANGE UP (ref 0–6)
FERRITIN SERPL-MCNC: 2380 NG/ML — HIGH (ref 30–400)
GLUCOSE BLDC GLUCOMTR-MCNC: 133 MG/DL — HIGH (ref 70–99)
GLUCOSE BLDC GLUCOMTR-MCNC: 138 MG/DL — HIGH (ref 70–99)
GLUCOSE BLDC GLUCOMTR-MCNC: 139 MG/DL — HIGH (ref 70–99)
GLUCOSE BLDC GLUCOMTR-MCNC: 169 MG/DL — HIGH (ref 70–99)
GLUCOSE SERPL-MCNC: 141 MG/DL — HIGH (ref 70–99)
HCT VFR BLD CALC: 30.4 % — LOW (ref 39–50)
HGB BLD-MCNC: 9.6 G/DL — LOW (ref 13–17)
INR BLD: 1.22 — HIGH (ref 0.88–1.16)
LYMPHOCYTES # BLD AUTO: 0.21 K/UL — LOW (ref 1–3.3)
LYMPHOCYTES # BLD AUTO: 2.6 % — LOW (ref 13–44)
MAGNESIUM SERPL-MCNC: 2.8 MG/DL — HIGH (ref 1.6–2.6)
MCHC RBC-ENTMCNC: 31.6 GM/DL — LOW (ref 32–36)
MCHC RBC-ENTMCNC: 32 PG — SIGNIFICANT CHANGE UP (ref 27–34)
MCV RBC AUTO: 101.3 FL — HIGH (ref 80–100)
MONOCYTES # BLD AUTO: 0.21 K/UL — SIGNIFICANT CHANGE UP (ref 0–0.9)
MONOCYTES NFR BLD AUTO: 2.6 % — SIGNIFICANT CHANGE UP (ref 2–14)
NEUTROPHILS # BLD AUTO: 7.6 K/UL — HIGH (ref 1.8–7.4)
NEUTROPHILS NFR BLD AUTO: 90.5 % — HIGH (ref 43–77)
PHOSPHATE SERPL-MCNC: 8.2 MG/DL — HIGH (ref 2.5–4.5)
PLATELET # BLD AUTO: 260 K/UL — SIGNIFICANT CHANGE UP (ref 150–400)
POTASSIUM SERPL-MCNC: 4.8 MMOL/L — SIGNIFICANT CHANGE UP (ref 3.5–5.3)
POTASSIUM SERPL-SCNC: 4.8 MMOL/L — SIGNIFICANT CHANGE UP (ref 3.5–5.3)
PROT SERPL-MCNC: 5.8 G/DL — LOW (ref 6–8.3)
PROTHROM AB SERPL-ACNC: 14 SEC — HIGH (ref 10–12.9)
RBC # BLD: 3 M/UL — LOW (ref 4.2–5.8)
RBC # FLD: 13.7 % — SIGNIFICANT CHANGE UP (ref 10.3–14.5)
SODIUM SERPL-SCNC: 142 MMOL/L — SIGNIFICANT CHANGE UP (ref 135–145)
SPECIMEN SOURCE: SIGNIFICANT CHANGE UP
SPECIMEN SOURCE: SIGNIFICANT CHANGE UP
WBC # BLD: 8.24 K/UL — SIGNIFICANT CHANGE UP (ref 3.8–10.5)
WBC # FLD AUTO: 8.24 K/UL — SIGNIFICANT CHANGE UP (ref 3.8–10.5)

## 2020-04-06 PROCEDURE — 90935 HEMODIALYSIS ONE EVALUATION: CPT

## 2020-04-06 RX ORDER — ALBUMIN HUMAN 25 %
50 VIAL (ML) INTRAVENOUS
Refills: 0 | Status: COMPLETED | OUTPATIENT
Start: 2020-04-06 | End: 2020-04-06

## 2020-04-06 RX ORDER — SEVELAMER CARBONATE 2400 MG/1
1600 POWDER, FOR SUSPENSION ORAL
Refills: 0 | Status: DISCONTINUED | OUTPATIENT
Start: 2020-04-06 | End: 2020-04-22

## 2020-04-06 RX ADMIN — ATORVASTATIN CALCIUM 20 MILLIGRAM(S): 80 TABLET, FILM COATED ORAL at 22:56

## 2020-04-06 RX ADMIN — SEVELAMER CARBONATE 1600 MILLIGRAM(S): 2400 POWDER, FOR SUSPENSION ORAL at 12:32

## 2020-04-06 RX ADMIN — HEPARIN SODIUM 5000 UNIT(S): 5000 INJECTION INTRAVENOUS; SUBCUTANEOUS at 05:26

## 2020-04-06 RX ADMIN — Medication 8 MILLIGRAM(S): at 22:52

## 2020-04-06 RX ADMIN — Medication 975 MILLIGRAM(S): at 05:27

## 2020-04-06 RX ADMIN — Medication 975 MILLIGRAM(S): at 12:31

## 2020-04-06 RX ADMIN — FAMOTIDINE 20 MILLIGRAM(S): 10 INJECTION INTRAVENOUS at 05:28

## 2020-04-06 RX ADMIN — SEVELAMER CARBONATE 1600 MILLIGRAM(S): 2400 POWDER, FOR SUSPENSION ORAL at 18:11

## 2020-04-06 RX ADMIN — MONTELUKAST 10 MILLIGRAM(S): 4 TABLET, CHEWABLE ORAL at 18:33

## 2020-04-06 RX ADMIN — Medication 50 MILLILITER(S): at 15:05

## 2020-04-06 RX ADMIN — Medication 975 MILLIGRAM(S): at 22:52

## 2020-04-06 RX ADMIN — SODIUM ZIRCONIUM CYCLOSILICATE 10 GRAM(S): 10 POWDER, FOR SUSPENSION ORAL at 21:12

## 2020-04-06 RX ADMIN — Medication 200 MILLIGRAM(S): at 18:33

## 2020-04-06 RX ADMIN — HEPARIN SODIUM 5000 UNIT(S): 5000 INJECTION INTRAVENOUS; SUBCUTANEOUS at 22:57

## 2020-04-06 RX ADMIN — Medication 50 MILLILITER(S): at 13:15

## 2020-04-06 RX ADMIN — Medication 2: at 12:31

## 2020-04-06 RX ADMIN — Medication 200 MILLIGRAM(S): at 05:27

## 2020-04-06 RX ADMIN — Medication 81 MILLIGRAM(S): at 22:52

## 2020-04-06 RX ADMIN — Medication 50 MILLILITER(S): at 14:15

## 2020-04-06 RX ADMIN — Medication 100 MILLIGRAM(S): at 18:10

## 2020-04-06 NOTE — PROGRESS NOTE ADULT - PROBLEM SELECTOR PLAN 3
Pt with hx of CKD now presented with , Cr 6.73, (unknown baseline).   - renal is on board--> HD initiated 4/2/20 via R IJ temp catheter   - PPD read as negative 4/5  - Daily CMP  - R IJ temp line placed  - HD per nephrology  - Started sevelamer 1600 TID for elevated phos  - c/w lokelma  - c/w Retacrit

## 2020-04-06 NOTE — CHART NOTE - NSCHARTNOTEFT_GEN_A_CORE
Upon Nutritional Assessment by the Registered Dietitian your patient was determined to meet criteria / has evidence of the following diagnosis/diagnoses:          [ ]  Mild Protein Calorie Malnutrition        [ ]  Moderate Protein Calorie Malnutrition        [x ] Severe Protein Calorie Malnutrition        [ ] Unspecified Protein Calorie Malnutrition        [ ] Underweight / BMI <19        [ ] Morbid Obesity / BMI > 40    Pt with inadequate energy intake x2 weeks prior to admission with subsequent ~18% (~40lbs) weight loss.     Findings as based on:  •  Comprehensive nutrition assessment and consultation    Treatment:    1. Monitor PO intake  2. Appreciate continued assistance and encouragement with meals  3. Add Glucerna TID (660kcal, 30g pro)    PROVIDER Section:     By signing this assessment you are acknowledging and agree with the diagnosis/diagnoses assigned by the Registered Dietitian    Comments:

## 2020-04-06 NOTE — CHART NOTE - NSCHARTNOTEFT_GEN_A_CORE
Admitting Diagnosis:   Patient is a 84y old  Male who presents with a chief complaint of fatigue, COVID (06 Apr 2020 08:24)      Consult: Yes [   ]  No [ x  ]    Reason for Initial Nutrition Assessment: LOS       PAST MEDICAL & SURGICAL HISTORY:  Gout  HLD (hyperlipidemia)  HTN (hypertension)  CKD (chronic kidney disease)  DM (diabetes mellitus)      Current Nutrition Order: DASH/TLC, Consistent CHO     PO Intake: Good (%) [   ]  Fair (50-75%) [   ] Poor (<25%) [ x  ]    GI Issues: no recent complaints of n/v/d/c     Pain: no recent complaints of pain     Skin Integrity: intact; hunter 16    Labs:   04-06    142  |  103  |  118<H>  ----------------------------<  141<H>  4.8   |  21<L>  |  8.36<H>    Ca    8.6      06 Apr 2020 06:57  Phos  8.2     04-06  Mg     2.8     04-06    TPro  5.8<L>  /  Alb  2.3<L>  /  TBili  0.3  /  DBili  x   /  AST  49<H>  /  ALT  63<H>  /  AlkPhos  82  04-06    CAPILLARY BLOOD GLUCOSE      POCT Blood Glucose.: 169 mg/dL (06 Apr 2020 12:15)  POCT Blood Glucose.: 138 mg/dL (06 Apr 2020 07:42)  POCT Blood Glucose.: 143 mg/dL (05 Apr 2020 21:52)  POCT Blood Glucose.: 139 mg/dL (05 Apr 2020 17:16)    Nutritionally Pertinent Lab Values: (4/6) , Cr 8.36, Glu 141, CRP 10.5 (trending down), Mg 2.8, Phos 8.2    Medications:  MEDICATIONS  (STANDING):  acetaminophen   Tablet .. 975 milliGRAM(s) Oral every 8 hours  albumin human 25% IVPB 50 milliLiter(s) IV Intermittent every 1 hour  allopurinol 100 milliGRAM(s) Oral daily  aspirin enteric coated 81 milliGRAM(s) Oral every 24 hours  atorvastatin 20 milliGRAM(s) Oral at bedtime  dextrose 5%. 1000 milliLiter(s) (50 mL/Hr) IV Continuous <Continuous>  dextrose 50% Injectable 12.5 Gram(s) IV Push once  dextrose 50% Injectable 25 Gram(s) IV Push once  dextrose 50% Injectable 25 Gram(s) IV Push once  doxazosin 8 milliGRAM(s) Oral at bedtime  famotidine    Tablet 20 milliGRAM(s) Oral every 48 hours  heparin  Injectable 5000 Unit(s) SubCutaneous every 8 hours  hydroxychloroquine 200 milliGRAM(s) Oral every 12 hours  insulin lispro (HumaLOG) corrective regimen sliding scale   SubCutaneous Before meals and at bedtime  montelukast 10 milliGRAM(s) Oral every 24 hours  PPD  5 Tuberculin Unit(s) Injectable 5 Unit(s) IntraDermal once  sevelamer carbonate 1600 milliGRAM(s) Oral three times a day with meals  sodium zirconium cyclosilicate 10 Gram(s) Oral daily    MEDICATIONS  (PRN):  dextrose 40% Gel 15 Gram(s) Oral once PRN Blood Glucose LESS THAN 70 milliGRAM(s)/deciliter  glucagon  Injectable 1 milliGRAM(s) IntraMuscular once PRN Glucose LESS THAN 70 milligrams/deciliter      Admitted Anthropometrics: Wt (4/2) 82.2kg; Ht (per son) 69in; BMI 26.7; IBW 72.7kg; %%    Weight:   (4/2) 82.5kg  (4/3) 81.8kg  (4/3) 80.7kg    Weight Change: minor fluid shifts noted     Nutrition Focused Physical Exam: Completed [   ]  Unable to complete [  x ]    Estimated energy needs:   ABW used to calculate energy needs due to pt's current body weight within % IBW  Needs calculated for age and increased slightly secondary to COVID+/infection/inflammation and concern for malnutrition  Energy: 2055-2466kcal (25-30cal/kg)  Protein: 99-115g pro (1.2-1.4g/kg pro)  Fluids per team    Subjective:   Pt with past medical history significant for DM2, HTN, HLD, CKD4.  Pt presents with fatigue, poor appetite, cough and malaise; COVID+.  Pt is noted to be confused at present.    Unable to conduct a face to face interview or nutrition-focused physical exam due to limited contact restrictions related to the pt's medical condition and isolation precautions.  Spoke with son (physician in [place]) and partner of 30+ years (emergency contact) over the phone.  Both family members spoke to patient's sharp wit prior to admission and drastic decline over the last two weeks.  Pt with good appetite/intake at baseline; consuming a diet low in sugar and potassium.  Pt with UBW ~220lbs.  Pt with very minimal intake x2 week prior to admission; sleeping the majority of the days.  Per spouse, pt was far into the process for a kidney transplant; currently determining need to initiate HD.  Starting phosphate binder today.  Per son, pt with poor appetite at present; minimal PO intake.  Per son, pt likely requires encouragement and redirection during meals given current mental status.  Inadequate energy intake prior to admission and ~18% weight loss x2 weeks are concerning for severe malnutrition in the setting of acute illness.      Nutrition Diagnosis: Increased nutrient needs    [  ] No active nutrition diagnosis at this time  [  ] Current medical condition precludes nutrition intervention    Goal:    Recommendations:    Education:     Risk Level: High [   ] Moderate [   ] Low [   ] Admitting Diagnosis:   Patient is a 84y old  Male who presents with a chief complaint of fatigue, COVID (06 Apr 2020 08:24)      Consult: Yes [   ]  No [ x  ]    Reason for Initial Nutrition Assessment: LOS       PAST MEDICAL & SURGICAL HISTORY:  Gout  HLD (hyperlipidemia)  HTN (hypertension)  CKD (chronic kidney disease)  DM (diabetes mellitus)      Current Nutrition Order: DASH/TLC, Consistent CHO     PO Intake: Good (%) [   ]  Fair (50-75%) [   ] Poor (<25%) [ x  ]    GI Issues: no recent complaints of n/v/d/c     Pain: no recent complaints of pain     Skin Integrity: intact; hunter 16    Labs:   04-06    142  |  103  |  118<H>  ----------------------------<  141<H>  4.8   |  21<L>  |  8.36<H>    Ca    8.6      06 Apr 2020 06:57  Phos  8.2     04-06  Mg     2.8     04-06    TPro  5.8<L>  /  Alb  2.3<L>  /  TBili  0.3  /  DBili  x   /  AST  49<H>  /  ALT  63<H>  /  AlkPhos  82  04-06    CAPILLARY BLOOD GLUCOSE      POCT Blood Glucose.: 169 mg/dL (06 Apr 2020 12:15)  POCT Blood Glucose.: 138 mg/dL (06 Apr 2020 07:42)  POCT Blood Glucose.: 143 mg/dL (05 Apr 2020 21:52)  POCT Blood Glucose.: 139 mg/dL (05 Apr 2020 17:16)    Nutritionally Pertinent Lab Values: (4/6) , Cr 8.36, Glu 141, CRP 10.5 (trending down), Mg 2.8, Phos 8.2    Medications:  MEDICATIONS  (STANDING):  acetaminophen   Tablet .. 975 milliGRAM(s) Oral every 8 hours  albumin human 25% IVPB 50 milliLiter(s) IV Intermittent every 1 hour  allopurinol 100 milliGRAM(s) Oral daily  aspirin enteric coated 81 milliGRAM(s) Oral every 24 hours  atorvastatin 20 milliGRAM(s) Oral at bedtime  dextrose 5%. 1000 milliLiter(s) (50 mL/Hr) IV Continuous <Continuous>  dextrose 50% Injectable 12.5 Gram(s) IV Push once  dextrose 50% Injectable 25 Gram(s) IV Push once  dextrose 50% Injectable 25 Gram(s) IV Push once  doxazosin 8 milliGRAM(s) Oral at bedtime  famotidine    Tablet 20 milliGRAM(s) Oral every 48 hours  heparin  Injectable 5000 Unit(s) SubCutaneous every 8 hours  hydroxychloroquine 200 milliGRAM(s) Oral every 12 hours  insulin lispro (HumaLOG) corrective regimen sliding scale   SubCutaneous Before meals and at bedtime  montelukast 10 milliGRAM(s) Oral every 24 hours  PPD  5 Tuberculin Unit(s) Injectable 5 Unit(s) IntraDermal once  sevelamer carbonate 1600 milliGRAM(s) Oral three times a day with meals  sodium zirconium cyclosilicate 10 Gram(s) Oral daily    MEDICATIONS  (PRN):  dextrose 40% Gel 15 Gram(s) Oral once PRN Blood Glucose LESS THAN 70 milliGRAM(s)/deciliter  glucagon  Injectable 1 milliGRAM(s) IntraMuscular once PRN Glucose LESS THAN 70 milligrams/deciliter      Admitted Anthropometrics: Wt (4/2) 82.2kg; Ht (per son) 69in; BMI 26.7; IBW 72.7kg; %%    Weight:   (4/2) 82.5kg  (4/3) 81.8kg  (4/3) 80.7kg    Weight Change: minor fluid shifts noted     Nutrition Focused Physical Exam: Completed [   ]  Unable to complete [  x ]    Estimated energy needs:   ABW used to calculate energy needs due to pt's current body weight within % IBW  Needs calculated for age and energy increased slightly secondary to COVID+/infection/inflammation and concern for malnutrition  Energy: 2055-2466kcal (25-30cal/kg)  Protein: 82-99g pro (1.0-1.2g/kg pro) - protein needs not adjusted for current COVID status 2/2 ESRD; less is protein is likely preferable given renal status, however, pt is currently hypermetabolic.  Will closely monitor and adjust as necessary and pending dialysis initiation   Fluids per team    Subjective:   Pt with past medical history significant for DM2, HTN, HLD, CKD4.  Pt presents with fatigue, poor appetite, cough and malaise; COVID+.  Pt is noted to be confused at present.    Unable to conduct a face to face interview or nutrition-focused physical exam due to limited contact restrictions related to the pt's medical condition and isolation precautions.  Spoke with son (physician in [place]) and partner of 30+ years (emergency contact) over the phone.  Both family members spoke to patient's sharp wit prior to admission and drastic decline over the last two weeks.  Pt with good appetite/intake at baseline; consuming a diet low in sugar and potassium.  Pt with UBW ~220lbs.  Pt with very minimal intake x2 week prior to admission; sleeping the majority of the days.  Per spouse, pt was far into the process for a kidney transplant; currently determining need to initiate HD.  Starting phosphate binder today.  Per son, pt with poor appetite at present; minimal PO intake.  Per son, pt likely requires encouragement and redirection during meals given current mental status.  Inadequate energy intake prior to admission and ~18% weight loss x2 weeks are concerning for severe malnutrition in the setting of acute illness.      Nutrition Diagnosis: Increased nutrient needs (energy) r/t increased demand for nutrient AEB COVID+/infection/inflammation     Goal: Pt to consistently meet at least 75% estimated nutrient needs     Recommendations:  1. Monitor PO intake  2. Appreciate continued assistance and encouragement with meals  3. Add Glucerna TID (660kcal, 30g pro)    Education: n/a     Risk Level: High [ x  ] Moderate [   ] Low [   ]

## 2020-04-06 NOTE — PROGRESS NOTE ADULT - SUBJECTIVE AND OBJECTIVE BOX
INTERVAL HPI/OVERNIGHT EVENTS: WBC decreasing, Creatine 8.36, , Phos 8. Starting sevelamer 1600 TID as per nephro, dialysis resources are critical shortage so may receive dialysis. PPD negative. Satting 100% on NRB.     SUBJECTIVE: Patient seen and examined at bedside. Resting comfortably on NRB.     OBJECTIVE:    VITAL SIGNS:  ICU Vital Signs Last 24 Hrs  T(C): 35.9 (06 Apr 2020 05:30), Max: 36.6 (05 Apr 2020 21:23)  T(F): 96.7 (06 Apr 2020 05:30), Max: 97.9 (05 Apr 2020 21:23)  HR: 72 (06 Apr 2020 05:30) (65 - 75)  BP: 133/65 (06 Apr 2020 05:30) (101/63 - 133/65)  BP(mean): 92 (06 Apr 2020 05:30) (67 - 92)  ABP: --  ABP(mean): --  RR: 20 (06 Apr 2020 05:30) (20 - 26)  SpO2: 93% (06 Apr 2020 05:30) (70% - 94%)        CAPILLARY BLOOD GLUCOSE      POCT Blood Glucose.: 138 mg/dL (06 Apr 2020 07:42)      PHYSICAL EXAM:  General: NAD  HEENT: NC/AT; PERRL, clear conjunctiva  Neck: supple  Respiratory: non-labored respirations.  Cardiovascular: +S1/S2; RRR  Abdomen: soft, NT/ND; +BS x4  Extremities: WWP, 2+ peripheral pulses b/l; no LE edema  Skin: normal color and turgor; no rash  Neurological: AAOx2    MEDICATIONS:  MEDICATIONS  (STANDING):  acetaminophen   Tablet .. 975 milliGRAM(s) Oral every 8 hours  albumin human 25% IVPB 50 milliLiter(s) IV Intermittent every 1 hour  allopurinol 100 milliGRAM(s) Oral daily  aspirin enteric coated 81 milliGRAM(s) Oral every 24 hours  atorvastatin 20 milliGRAM(s) Oral at bedtime  dextrose 5%. 1000 milliLiter(s) (50 mL/Hr) IV Continuous <Continuous>  dextrose 50% Injectable 12.5 Gram(s) IV Push once  dextrose 50% Injectable 25 Gram(s) IV Push once  dextrose 50% Injectable 25 Gram(s) IV Push once  doxazosin 8 milliGRAM(s) Oral at bedtime  famotidine    Tablet 20 milliGRAM(s) Oral every 48 hours  heparin  Injectable 5000 Unit(s) SubCutaneous every 8 hours  hydroxychloroquine 200 milliGRAM(s) Oral every 12 hours  insulin lispro (HumaLOG) corrective regimen sliding scale   SubCutaneous Before meals and at bedtime  montelukast 10 milliGRAM(s) Oral every 24 hours  PPD  5 Tuberculin Unit(s) Injectable 5 Unit(s) IntraDermal once  sevelamer carbonate 1600 milliGRAM(s) Oral three times a day with meals  sodium zirconium cyclosilicate 10 Gram(s) Oral daily    MEDICATIONS  (PRN):  dextrose 40% Gel 15 Gram(s) Oral once PRN Blood Glucose LESS THAN 70 milliGRAM(s)/deciliter  glucagon  Injectable 1 milliGRAM(s) IntraMuscular once PRN Glucose LESS THAN 70 milligrams/deciliter      ALLERGIES:  Allergies    penicillin (Unknown)  sulfa drugs (Unknown)    Intolerances        LABS:                        9.6    8.24  )-----------( 260      ( 06 Apr 2020 06:57 )             30.4     04-06    142  |  103  |  118<H>  ----------------------------<  141<H>  4.8   |  21<L>  |  8.36<H>    Ca    8.6      06 Apr 2020 06:57  Phos  8.2     04-06  Mg     2.8     04-06    TPro  5.8<L>  /  Alb  2.3<L>  /  TBili  0.3  /  DBili  x   /  AST  49<H>  /  ALT  63<H>  /  AlkPhos  82  04-06          Daily COVID labs  Procalcitonin:   D-dimer: D-Dimer Assay, Quantitative: 152 ng/mL DDU (04-06-20 @ 06:57)  D-Dimer Assay, Quantitative: 244 ng/mL DDU (04-05-20 @ 06:25)  D-Dimer Assay, Quantitative: 185 ng/mL DDU (04-04-20 @ 06:13)    ESR:   CRP: C-Reactive Protein, Serum: 10.58 mg/dL (04-06-20 @ 06:57)  C-Reactive Protein, Serum: 12.79 mg/dL (04-05-20 @ 06:25)  C-Reactive Protein, Serum: 16.46 mg/dL (04-04-20 @ 06:13)    LDH:   Ferritin: Ferritin, Serum: 2380 ng/mL (04-06-20 @ 06:57)  Ferritin, Serum: 2575 ng/mL (04-05-20 @ 06:25)  Ferritin, Serum: 2394 ng/mL (04-04-20 @ 06:13)    Lactate: lc  Trop I:   Ck:     Admission COVID Labs  Full T cell subset:   G6PD:   Immunoglobulins panel:   Quantiferon Gold Tb:   Triglyceride level:           RADIOLOGY & ADDITIONAL TESTS: Reviewed.

## 2020-04-06 NOTE — PROGRESS NOTE ADULT - PROBLEM SELECTOR PLAN 5
Pt on lasix 20 BID, norvasc 10 qd and doxazosin at home. Currently BP is around 160  - Hold norvasc 10 daily  - Hold lasix  - Resumed doxazosin 8 daily

## 2020-04-06 NOTE — PROGRESS NOTE ADULT - PROBLEM SELECTOR PLAN 2
Likely viral pneumonia 2/2 to COVID-19 infection  -CXR showed Bilateral patchy/hazy opacities with a mid/lower lung zone   -COVID treatment initiated, as above  -c/w NRB, descalate as tolerated  -monitor for hypoxemia

## 2020-04-06 NOTE — PROGRESS NOTE ADULT - ASSESSMENT
A 84 years old M with PMH of IDDM, HTN, HLD, CKD stage 4, sleep apnea (has CPAP machine), gout, Paget's s/p Reclast in 6/2014. 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 and verrucous acanthoma, presented from his nephrologist office (Dr. Valentin) because of fatigue and poor appetite and malaise w/ apparent increased creatinine in past week w/ productive cough.   COVID+ and patient was started on triple therapy (holding ascorbic acid since patient is ESRD).    # GRACIE on worsening CKD 5 in setting of COVID infection  - Pt with hx of CKD presented with BUN oBUN 114, Cr 6.73, (unknown baseline).   - HD initiated 4/2/20 via R IJ temp catheter   - getting HD today - catheter allows only 200 BRF   - Primary team aware  - daily weight  - renal diet  - strict I/O  - Volume and electrolytes noted       # HTN  - Resume home meds  - UF with HD    # Renal bone disease  - send phos/pth/vit D

## 2020-04-06 NOTE — PROGRESS NOTE ADULT - PROBLEM SELECTOR PLAN 1
COVID 19 + (4/1)  - Continue azithromycin 250 for 5 days total, complete 4/6  - Continue on plaquenil for 5 days (400 bid for the first day and 200 bid for the rest of the duration), complete 4/6  - Daily COVID labs: d-dimer, CRP and ferritin  - Pending G6PD

## 2020-04-06 NOTE — PROGRESS NOTE ADULT - SUBJECTIVE AND OBJECTIVE BOX
Patient was seen and evaluated on dialysis.   HR: 73 (04-06-20 @ 13:10)  BP: 122/64 (04-06-20 @ 13:10)      Continue dialysis:   Dialyzer:   180    QB: 400  K bath: 2  Goal UF:     2  L   over      150         min    Poor blood flow noted - catheter allows for only 200 bfr        Meds:    acetaminophen   Tablet .. 975 milliGRAM(s) Oral every 8 hours  allopurinol 100 milliGRAM(s) Oral daily  aspirin enteric coated 81 milliGRAM(s) Oral every 24 hours  atorvastatin 20 milliGRAM(s) Oral at bedtime  dextrose 40% Gel 15 Gram(s) Oral once PRN  dextrose 5%. 1000 milliLiter(s) IV Continuous <Continuous>  dextrose 50% Injectable 12.5 Gram(s) IV Push once  dextrose 50% Injectable 25 Gram(s) IV Push once  dextrose 50% Injectable 25 Gram(s) IV Push once  doxazosin 8 milliGRAM(s) Oral at bedtime  famotidine    Tablet 20 milliGRAM(s) Oral every 48 hours  glucagon  Injectable 1 milliGRAM(s) IntraMuscular once PRN  heparin  Injectable 5000 Unit(s) SubCutaneous every 8 hours  hydroxychloroquine 200 milliGRAM(s) Oral every 12 hours  insulin lispro (HumaLOG) corrective regimen sliding scale   SubCutaneous Before meals and at bedtime  montelukast 10 milliGRAM(s) Oral every 24 hours  PPD  5 Tuberculin Unit(s) Injectable 5 Unit(s) IntraDermal once  sevelamer carbonate 1600 milliGRAM(s) Oral three times a day with meals  sodium zirconium cyclosilicate 10 Gram(s) Oral daily      T(C): 36.3 (04-06-20 @ 13:10), Max: 36.6 (04-05-20 @ 21:23)  HR: 73 (04-06-20 @ 13:10) (67 - 75)  BP: 122/64 (04-06-20 @ 13:10) (101/63 - 133/65)  RR: 19 (04-06-20 @ 13:10) (19 - 26)  SpO2: 94% (04-06-20 @ 13:10) (70% - 94%)    Input/Output      04-06-20 @ 07:01  -  04-06-20 @ 15:20  --------------------------------------------------------  IN: 180 mL / OUT: 0 mL / NET: 180 mL            ROS:     drowsy            PHYSICAL EXAM as per primary due to covid:  GENERAL: NAD, well-developed, well nourished, alert, awake, no acute distress at present  NECK: Neck supple, No JVD  CHEST/LUNG: Clear to auscultation bilaterally; No wheeze, no rales, no crepitations  HEART: Regular rate and rhythm. S1S2+  No gallop, no rub   ABDOMEN: Soft, Nontender, BS+nt, No flank tenderness.   EXTREMITIES: No clubbing, cyanosis, or edema  Neurology: sleeping,            ACCESS: right IJ    LABS:                                            9.6    8.24  )-----------( 260      ( 06 Apr 2020 06:57 )             30.4       04-06    142  |  103  |  118<H>  ----------------------------<  141<H>  4.8   |  21<L>  |  8.36<H>    Ca    8.6      06 Apr 2020 06:57  Phos  8.2     04-06  Mg     2.8     04-06    TPro  5.8<L>  /  Alb  2.3<L>  /  TBili  0.3  /  DBili  x   /  AST  49<H>  /  ALT  63<H>  /  AlkPhos  82  04-06      PT/INR - ( 06 Apr 2020 06:57 )   PT: 14.0 sec;   INR: 1.22          PTT - ( 06 Apr 2020 06:57 )  PTT:33.7 sec                RADIOLOGY & ADDITIONAL STUDIES:

## 2020-04-07 LAB
ALBUMIN SERPL ELPH-MCNC: 3.2 G/DL — LOW (ref 3.3–5)
ALP SERPL-CCNC: 83 U/L — SIGNIFICANT CHANGE UP (ref 40–120)
ALT FLD-CCNC: 61 U/L — HIGH (ref 10–45)
ANION GAP SERPL CALC-SCNC: 20 MMOL/L — HIGH (ref 5–17)
APTT BLD: 35.4 SEC — SIGNIFICANT CHANGE UP (ref 27.5–36.3)
AST SERPL-CCNC: 55 U/L — HIGH (ref 10–40)
BASOPHILS # BLD AUTO: 0.01 K/UL — SIGNIFICANT CHANGE UP (ref 0–0.2)
BASOPHILS NFR BLD AUTO: 0.1 % — SIGNIFICANT CHANGE UP (ref 0–2)
BILIRUB SERPL-MCNC: 0.4 MG/DL — SIGNIFICANT CHANGE UP (ref 0.2–1.2)
BUN SERPL-MCNC: 101 MG/DL — HIGH (ref 7–23)
CALCIUM SERPL-MCNC: 9.1 MG/DL — SIGNIFICANT CHANGE UP (ref 8.4–10.5)
CHLORIDE SERPL-SCNC: 101 MMOL/L — SIGNIFICANT CHANGE UP (ref 96–108)
CO2 SERPL-SCNC: 21 MMOL/L — LOW (ref 22–31)
CREAT SERPL-MCNC: 7.94 MG/DL — HIGH (ref 0.5–1.3)
CRP SERPL-MCNC: 10.66 MG/DL — HIGH (ref 0–0.4)
D DIMER BLD IA.RAPID-MCNC: 200 NG/ML DDU — SIGNIFICANT CHANGE UP
EOSINOPHIL # BLD AUTO: 0.13 K/UL — SIGNIFICANT CHANGE UP (ref 0–0.5)
EOSINOPHIL NFR BLD AUTO: 1.5 % — SIGNIFICANT CHANGE UP (ref 0–6)
FERRITIN SERPL-MCNC: 1905 NG/ML — HIGH (ref 30–400)
GLUCOSE BLDC GLUCOMTR-MCNC: 127 MG/DL — HIGH (ref 70–99)
GLUCOSE BLDC GLUCOMTR-MCNC: 131 MG/DL — HIGH (ref 70–99)
GLUCOSE BLDC GLUCOMTR-MCNC: 139 MG/DL — HIGH (ref 70–99)
GLUCOSE BLDC GLUCOMTR-MCNC: 178 MG/DL — HIGH (ref 70–99)
GLUCOSE SERPL-MCNC: 126 MG/DL — HIGH (ref 70–99)
HCT VFR BLD CALC: 29.2 % — LOW (ref 39–50)
HGB BLD-MCNC: 9.6 G/DL — LOW (ref 13–17)
IMM GRANULOCYTES NFR BLD AUTO: 6.8 % — HIGH (ref 0–1.5)
INR BLD: 1.21 — HIGH (ref 0.88–1.16)
LYMPHOCYTES # BLD AUTO: 0.44 K/UL — LOW (ref 1–3.3)
LYMPHOCYTES # BLD AUTO: 5.2 % — LOW (ref 13–44)
MAGNESIUM SERPL-MCNC: 3 MG/DL — HIGH (ref 1.6–2.6)
MCHC RBC-ENTMCNC: 32.9 GM/DL — SIGNIFICANT CHANGE UP (ref 32–36)
MCHC RBC-ENTMCNC: 33.1 PG — SIGNIFICANT CHANGE UP (ref 27–34)
MCV RBC AUTO: 100.7 FL — HIGH (ref 80–100)
MONOCYTES # BLD AUTO: 0.42 K/UL — SIGNIFICANT CHANGE UP (ref 0–0.9)
MONOCYTES NFR BLD AUTO: 4.9 % — SIGNIFICANT CHANGE UP (ref 2–14)
NEUTROPHILS # BLD AUTO: 6.93 K/UL — SIGNIFICANT CHANGE UP (ref 1.8–7.4)
NEUTROPHILS NFR BLD AUTO: 81.5 % — HIGH (ref 43–77)
NRBC # BLD: 0 /100 WBCS — SIGNIFICANT CHANGE UP (ref 0–0)
PHOSPHATE SERPL-MCNC: 8.2 MG/DL — HIGH (ref 2.5–4.5)
PLATELET # BLD AUTO: 257 K/UL — SIGNIFICANT CHANGE UP (ref 150–400)
POTASSIUM SERPL-MCNC: 4.9 MMOL/L — SIGNIFICANT CHANGE UP (ref 3.5–5.3)
POTASSIUM SERPL-SCNC: 4.9 MMOL/L — SIGNIFICANT CHANGE UP (ref 3.5–5.3)
PROT SERPL-MCNC: 6.6 G/DL — SIGNIFICANT CHANGE UP (ref 6–8.3)
PROTHROM AB SERPL-ACNC: 13.9 SEC — HIGH (ref 10–12.9)
RBC # BLD: 2.9 M/UL — LOW (ref 4.2–5.8)
RBC # FLD: 13.7 % — SIGNIFICANT CHANGE UP (ref 10.3–14.5)
SODIUM SERPL-SCNC: 142 MMOL/L — SIGNIFICANT CHANGE UP (ref 135–145)
WBC # BLD: 8.51 K/UL — SIGNIFICANT CHANGE UP (ref 3.8–10.5)
WBC # FLD AUTO: 8.51 K/UL — SIGNIFICANT CHANGE UP (ref 3.8–10.5)

## 2020-04-07 PROCEDURE — 99233 SBSQ HOSP IP/OBS HIGH 50: CPT | Mod: CS

## 2020-04-07 PROCEDURE — 99232 SBSQ HOSP IP/OBS MODERATE 35: CPT | Mod: GC

## 2020-04-07 RX ORDER — ALTEPLASE 100 MG
2 KIT INTRAVENOUS ONCE
Refills: 0 | Status: COMPLETED | OUTPATIENT
Start: 2020-04-07 | End: 2020-04-07

## 2020-04-07 RX ADMIN — Medication 2: at 12:37

## 2020-04-07 RX ADMIN — HEPARIN SODIUM 5000 UNIT(S): 5000 INJECTION INTRAVENOUS; SUBCUTANEOUS at 22:33

## 2020-04-07 RX ADMIN — MONTELUKAST 10 MILLIGRAM(S): 4 TABLET, CHEWABLE ORAL at 17:13

## 2020-04-07 RX ADMIN — ATORVASTATIN CALCIUM 20 MILLIGRAM(S): 80 TABLET, FILM COATED ORAL at 22:33

## 2020-04-07 RX ADMIN — Medication 100 MILLIGRAM(S): at 13:06

## 2020-04-07 RX ADMIN — SODIUM ZIRCONIUM CYCLOSILICATE 10 GRAM(S): 10 POWDER, FOR SUSPENSION ORAL at 20:27

## 2020-04-07 RX ADMIN — HEPARIN SODIUM 5000 UNIT(S): 5000 INJECTION INTRAVENOUS; SUBCUTANEOUS at 13:07

## 2020-04-07 RX ADMIN — Medication 81 MILLIGRAM(S): at 17:13

## 2020-04-07 RX ADMIN — Medication 975 MILLIGRAM(S): at 13:07

## 2020-04-07 RX ADMIN — SEVELAMER CARBONATE 1600 MILLIGRAM(S): 2400 POWDER, FOR SUSPENSION ORAL at 09:42

## 2020-04-07 RX ADMIN — HEPARIN SODIUM 5000 UNIT(S): 5000 INJECTION INTRAVENOUS; SUBCUTANEOUS at 06:06

## 2020-04-07 RX ADMIN — Medication 975 MILLIGRAM(S): at 22:37

## 2020-04-07 RX ADMIN — Medication 975 MILLIGRAM(S): at 06:06

## 2020-04-07 RX ADMIN — Medication 8 MILLIGRAM(S): at 22:33

## 2020-04-07 NOTE — PROGRESS NOTE ADULT - PROBLEM SELECTOR PLAN 3
Pt with hx of CKD now presented with , Cr 6.73, (unknown baseline).   - renal is on board--> HD initiated 4/2/20 via R IJ temp catheter   - PPD read as negative 4/5  - Daily CMP  - HD per nephrology  - Started sevelamer 1600 TID for elevated phos  - c/w lokelma  - c/w Retacrit

## 2020-04-07 NOTE — PROGRESS NOTE ADULT - PROBLEM SELECTOR PLAN 1
COVID 19 + (4/1)  - azithromycin 250 for 5 days total, complete 4/6  - plaquenil for 5 days (400 bid for the first day and 200 bid for the rest of the duration), complete 4/6  - Daily COVID labs: d-dimer, CRP and ferritin  - Pending G6PD

## 2020-04-07 NOTE — PROGRESS NOTE ADULT - SUBJECTIVE AND OBJECTIVE BOX
chart reviewed   case discussed with med and nursing teams  tolerated HD adequately yesterday- will need to reposition HD  catheter with next HD  still on non rebreather mask, but O2 sats up to 96%  still a bit confused per nurses-  processing very slow-  does recognize my name per team      T(C): , Max: 36.3 (04-06-20 @ 13:10)  T(F): , Max: 97.3 (04-06-20 @ 13:10)  HR: 62 (04-07-20 @ 09:00)  BP: 116/58 (04-07-20 @ 09:00)  BP(mean): 87 (04-06-20 @ 23:10)  RR: 20 (04-07-20 @ 09:00)  SpO2: 96% (04-07-20 @ 09:00)  Wt(kg): --    04-06 @ 07:01  -  04-07 @ 07:00  --------------------------------------------------------  IN:    Oral Fluid: 180 mL    Other: 500 mL  Total IN: 680 mL    OUT:    Other: 2500 mL  Total OUT: 2500 mL    Total NET: -1820 mL            acetaminophen   Tablet .. 975 every 8 hours  allopurinol 100 daily  aspirin enteric coated 81 every 24 hours  atorvastatin 20 at bedtime  dextrose 5%. 1000 <Continuous>  dextrose 50% Injectable 12.5 once  dextrose 50% Injectable 25 once  dextrose 50% Injectable 25 once  doxazosin 8 at bedtime  famotidine    Tablet 20 every 48 hours  heparin  Injectable 5000 every 8 hours  insulin lispro (HumaLOG) corrective regimen sliding scale  Before meals and at bedtime  montelukast 10 every 24 hours  PPD  5 Tuberculin Unit(s) Injectable 5 once  sevelamer carbonate 1600 three times a day with meals  sodium zirconium cyclosilicate 10 daily    Allergies    penicillin (Unknown)  sulfa drugs (Unknown)      PHYSICAL EXAM:  given covid 19 pandemic- only 1 physician going into room if otherwise no significant changes/findings to PE  reviewed with med team    LABS:                        9.6    8.51  )-----------( 257      ( 07 Apr 2020 10:46 )             29.2     04-06    142  |  103  |  118<H>  ----------------------------<  141<H>  4.8   |  21<L>  |  8.36<H>    Ca    8.6      06 Apr 2020 06:57  Phos  8.2     04-06  Mg     2.8     04-06    TPro  5.8<L>  /  Alb  2.3<L>  /  TBili  0.3  /  DBili  x   /  AST  49<H>  /  ALT  63<H>  /  AlkPhos  82  04-06      PT/INR - ( 07 Apr 2020 10:46 )   PT: 13.9 sec;   INR: 1.21          PTT - ( 07 Apr 2020 10:46 )  PTT:35.4 sec          RADIOLOGY & ADDITIONAL STUDIES:

## 2020-04-07 NOTE — PROGRESS NOTE ADULT - ASSESSMENT
84 y o man new start ESRD- admitted for HD and found to be covid + and then develop symptoms of covid illness-  h/o DM, HTN, CAD KACI     Covid 19 infection- c/w protocols in place-   will continue to communicate pt's condition to his wife and son  ESRD-  tolerated HD yesterday- was treatment # 2-    no HD scheduled for today  acidosis and hyperkalemia improved  reassess in AM- prefer repeat dialysis in AM, but will evaluate resources and needs  c/w renal Diet  Hepatitis panel- neg  PPD- neg  CXR, EKG done  Outpatient HD placement will be at [address] [place] [address]      HTN - hold doxazosin if BP remains < 130  anemia- start NAVIN once stabilized- would probably not respond to agent now given his over imflammatory condition  renal bone disease

## 2020-04-07 NOTE — PROGRESS NOTE ADULT - SUBJECTIVE AND OBJECTIVE BOX
INTERVAL HPI/OVERNIGHT EVENTS: trialed on venti mask, Pending HD cath exchange.     SUBJECTIVE: Patient seen and examined at bedside. Comfortable. NPO    OBJECTIVE:    VITAL SIGNS:  ICU Vital Signs Last 24 Hrs  T(C): 36.2 (07 Apr 2020 06:20), Max: 36.3 (06 Apr 2020 13:10)  T(F): 97.2 (07 Apr 2020 06:20), Max: 97.3 (06 Apr 2020 13:10)  HR: 62 (07 Apr 2020 09:00) (62 - 80)  BP: 116/58 (07 Apr 2020 09:00) (116/58 - 149/63)  BP(mean): 87 (06 Apr 2020 23:10) (87 - 91)  ABP: --  ABP(mean): --  RR: 20 (07 Apr 2020 09:00) (19 - 75)  SpO2: 96% (07 Apr 2020 09:00) (94% - 99%)        04-06 @ 07:01  -  04-07 @ 07:00  --------------------------------------------------------  IN: 680 mL / OUT: 2500 mL / NET: -1820 mL      CAPILLARY BLOOD GLUCOSE      POCT Blood Glucose.: 178 mg/dL (07 Apr 2020 11:43)      PHYSICAL EXAM:    General: NAD  HEENT: NC/AT; PERRL, clear conjunctiva  Neck: supple  Respiratory: CTA b/l  Cardiovascular: +S1/S2; RRR  Abdomen: soft, NT/ND; +BS x4  Extremities: WWP, 2+ peripheral pulses b/l; no LE edema  Skin: normal color and turgor; no rash  Neurological:     MEDICATIONS:  MEDICATIONS  (STANDING):  acetaminophen   Tablet .. 975 milliGRAM(s) Oral every 8 hours  allopurinol 100 milliGRAM(s) Oral daily  aspirin enteric coated 81 milliGRAM(s) Oral every 24 hours  atorvastatin 20 milliGRAM(s) Oral at bedtime  dextrose 5%. 1000 milliLiter(s) (50 mL/Hr) IV Continuous <Continuous>  dextrose 50% Injectable 12.5 Gram(s) IV Push once  dextrose 50% Injectable 25 Gram(s) IV Push once  dextrose 50% Injectable 25 Gram(s) IV Push once  doxazosin 8 milliGRAM(s) Oral at bedtime  famotidine    Tablet 20 milliGRAM(s) Oral every 48 hours  heparin  Injectable 5000 Unit(s) SubCutaneous every 8 hours  insulin lispro (HumaLOG) corrective regimen sliding scale   SubCutaneous Before meals and at bedtime  montelukast 10 milliGRAM(s) Oral every 24 hours  PPD  5 Tuberculin Unit(s) Injectable 5 Unit(s) IntraDermal once  sevelamer carbonate 1600 milliGRAM(s) Oral three times a day with meals  sodium zirconium cyclosilicate 10 Gram(s) Oral daily    MEDICATIONS  (PRN):  dextrose 40% Gel 15 Gram(s) Oral once PRN Blood Glucose LESS THAN 70 milliGRAM(s)/deciliter  glucagon  Injectable 1 milliGRAM(s) IntraMuscular once PRN Glucose LESS THAN 70 milligrams/deciliter      ALLERGIES:  Allergies    penicillin (Unknown)  sulfa drugs (Unknown)    Intolerances        LABS:                        9.6    8.51  )-----------( 257      ( 07 Apr 2020 10:46 )             29.2     04-07    142  |  101  |  101<H>  ----------------------------<  126<H>  4.9   |  21<L>  |  7.94<H>    Ca    9.1      07 Apr 2020 10:46  Phos  8.2     04-07  Mg     3.0     04-07    TPro  6.6  /  Alb  3.2<L>  /  TBili  0.4  /  DBili  x   /  AST  55<H>  /  ALT  61<H>  /  AlkPhos  83  04-07    PT/INR - ( 07 Apr 2020 10:46 )   PT: 13.9 sec;   INR: 1.21          PTT - ( 07 Apr 2020 10:46 )  PTT:35.4 sec      Daily COVID labs  Procalcitonin:   D-dimer: D-Dimer Assay, Quantitative: 200 ng/mL DDU (04-07-20 @ 10:46)  D-Dimer Assay, Quantitative: 152 ng/mL DDU (04-06-20 @ 06:57)  D-Dimer Assay, Quantitative: 244 ng/mL DDU (04-05-20 @ 06:25)    ESR:   CRP: C-Reactive Protein, Serum: 10.66 mg/dL (04-07-20 @ 10:46)  C-Reactive Protein, Serum: 10.58 mg/dL (04-06-20 @ 06:57)  C-Reactive Protein, Serum: 12.79 mg/dL (04-05-20 @ 06:25)    LDH:   Ferritin: Ferritin, Serum: 2380 ng/mL (04-06-20 @ 06:57)  Ferritin, Serum: 2575 ng/mL (04-05-20 @ 06:25)    Lactate: lc  Trop I:   Ck:     Admission COVID Labs  Full T cell subset:   G6PD:   Immunoglobulins panel:   Quantiferon Gold Tb:   Triglyceride level:           RADIOLOGY & ADDITIONAL TESTS: Reviewed.

## 2020-04-08 LAB
ALBUMIN SERPL ELPH-MCNC: 3 G/DL — LOW (ref 3.3–5)
ALP SERPL-CCNC: 96 U/L — SIGNIFICANT CHANGE UP (ref 40–120)
ALT FLD-CCNC: 66 U/L — HIGH (ref 10–45)
ANION GAP SERPL CALC-SCNC: 22 MMOL/L — HIGH (ref 5–17)
AST SERPL-CCNC: 57 U/L — HIGH (ref 10–40)
BASOPHILS # BLD AUTO: 0.01 K/UL — SIGNIFICANT CHANGE UP (ref 0–0.2)
BASOPHILS NFR BLD AUTO: 0.1 % — SIGNIFICANT CHANGE UP (ref 0–2)
BILIRUB SERPL-MCNC: 0.4 MG/DL — SIGNIFICANT CHANGE UP (ref 0.2–1.2)
BUN SERPL-MCNC: 120 MG/DL — HIGH (ref 7–23)
CALCIUM SERPL-MCNC: 9.4 MG/DL — SIGNIFICANT CHANGE UP (ref 8.4–10.5)
CHLORIDE SERPL-SCNC: 104 MMOL/L — SIGNIFICANT CHANGE UP (ref 96–108)
CO2 SERPL-SCNC: 20 MMOL/L — LOW (ref 22–31)
CREAT SERPL-MCNC: 8.56 MG/DL — HIGH (ref 0.5–1.3)
CRP SERPL-MCNC: 10.95 MG/DL — HIGH (ref 0–0.4)
D DIMER BLD IA.RAPID-MCNC: 208 NG/ML DDU — SIGNIFICANT CHANGE UP
EOSINOPHIL # BLD AUTO: 0.13 K/UL — SIGNIFICANT CHANGE UP (ref 0–0.5)
EOSINOPHIL NFR BLD AUTO: 1.4 % — SIGNIFICANT CHANGE UP (ref 0–6)
FERRITIN SERPL-MCNC: 1870 NG/ML — HIGH (ref 30–400)
GLUCOSE BLDC GLUCOMTR-MCNC: 112 MG/DL — HIGH (ref 70–99)
GLUCOSE BLDC GLUCOMTR-MCNC: 117 MG/DL — HIGH (ref 70–99)
GLUCOSE BLDC GLUCOMTR-MCNC: 118 MG/DL — HIGH (ref 70–99)
GLUCOSE BLDC GLUCOMTR-MCNC: 126 MG/DL — HIGH (ref 70–99)
GLUCOSE SERPL-MCNC: 127 MG/DL — HIGH (ref 70–99)
HCT VFR BLD CALC: 29.9 % — LOW (ref 39–50)
HGB BLD-MCNC: 9.6 G/DL — LOW (ref 13–17)
IMM GRANULOCYTES NFR BLD AUTO: 7 % — HIGH (ref 0–1.5)
LYMPHOCYTES # BLD AUTO: 0.41 K/UL — LOW (ref 1–3.3)
LYMPHOCYTES # BLD AUTO: 4.3 % — LOW (ref 13–44)
MAGNESIUM SERPL-MCNC: 3.2 MG/DL — HIGH (ref 1.6–2.6)
MCHC RBC-ENTMCNC: 32.1 GM/DL — SIGNIFICANT CHANGE UP (ref 32–36)
MCHC RBC-ENTMCNC: 33.1 PG — SIGNIFICANT CHANGE UP (ref 27–34)
MCV RBC AUTO: 103.1 FL — HIGH (ref 80–100)
MONOCYTES # BLD AUTO: 0.4 K/UL — SIGNIFICANT CHANGE UP (ref 0–0.9)
MONOCYTES NFR BLD AUTO: 4.2 % — SIGNIFICANT CHANGE UP (ref 2–14)
NEUTROPHILS # BLD AUTO: 7.87 K/UL — HIGH (ref 1.8–7.4)
NEUTROPHILS NFR BLD AUTO: 83 % — HIGH (ref 43–77)
NRBC # BLD: 0 /100 WBCS — SIGNIFICANT CHANGE UP (ref 0–0)
PHOSPHATE SERPL-MCNC: 9.3 MG/DL — HIGH (ref 2.5–4.5)
PLATELET # BLD AUTO: 267 K/UL — SIGNIFICANT CHANGE UP (ref 150–400)
POTASSIUM SERPL-MCNC: 5.2 MMOL/L — SIGNIFICANT CHANGE UP (ref 3.5–5.3)
POTASSIUM SERPL-SCNC: 5.2 MMOL/L — SIGNIFICANT CHANGE UP (ref 3.5–5.3)
PROT SERPL-MCNC: 6.6 G/DL — SIGNIFICANT CHANGE UP (ref 6–8.3)
RBC # BLD: 2.9 M/UL — LOW (ref 4.2–5.8)
RBC # FLD: 14 % — SIGNIFICANT CHANGE UP (ref 10.3–14.5)
SODIUM SERPL-SCNC: 146 MMOL/L — HIGH (ref 135–145)
WBC # BLD: 9.48 K/UL — SIGNIFICANT CHANGE UP (ref 3.8–10.5)
WBC # FLD AUTO: 9.48 K/UL — SIGNIFICANT CHANGE UP (ref 3.8–10.5)

## 2020-04-08 PROCEDURE — 99233 SBSQ HOSP IP/OBS HIGH 50: CPT | Mod: CS

## 2020-04-08 PROCEDURE — 90935 HEMODIALYSIS ONE EVALUATION: CPT

## 2020-04-08 RX ORDER — ALBUMIN HUMAN 25 %
50 VIAL (ML) INTRAVENOUS
Refills: 0 | Status: COMPLETED | OUTPATIENT
Start: 2020-04-08 | End: 2020-04-08

## 2020-04-08 RX ADMIN — SEVELAMER CARBONATE 1600 MILLIGRAM(S): 2400 POWDER, FOR SUSPENSION ORAL at 15:00

## 2020-04-08 RX ADMIN — Medication 50 MILLILITER(S): at 11:00

## 2020-04-08 RX ADMIN — Medication 975 MILLIGRAM(S): at 23:16

## 2020-04-08 RX ADMIN — HEPARIN SODIUM 5000 UNIT(S): 5000 INJECTION INTRAVENOUS; SUBCUTANEOUS at 06:21

## 2020-04-08 RX ADMIN — SODIUM ZIRCONIUM CYCLOSILICATE 10 GRAM(S): 10 POWDER, FOR SUSPENSION ORAL at 16:49

## 2020-04-08 RX ADMIN — Medication 81 MILLIGRAM(S): at 23:15

## 2020-04-08 RX ADMIN — SEVELAMER CARBONATE 1600 MILLIGRAM(S): 2400 POWDER, FOR SUSPENSION ORAL at 18:36

## 2020-04-08 RX ADMIN — HEPARIN SODIUM 5000 UNIT(S): 5000 INJECTION INTRAVENOUS; SUBCUTANEOUS at 15:01

## 2020-04-08 RX ADMIN — Medication 975 MILLIGRAM(S): at 06:20

## 2020-04-08 RX ADMIN — ATORVASTATIN CALCIUM 20 MILLIGRAM(S): 80 TABLET, FILM COATED ORAL at 23:16

## 2020-04-08 RX ADMIN — Medication 8 MILLIGRAM(S): at 23:15

## 2020-04-08 RX ADMIN — Medication 975 MILLIGRAM(S): at 15:00

## 2020-04-08 RX ADMIN — HEPARIN SODIUM 5000 UNIT(S): 5000 INJECTION INTRAVENOUS; SUBCUTANEOUS at 23:16

## 2020-04-08 RX ADMIN — Medication 50 MILLILITER(S): at 12:00

## 2020-04-08 RX ADMIN — Medication 50 MILLILITER(S): at 13:00

## 2020-04-08 RX ADMIN — Medication 100 MILLIGRAM(S): at 15:05

## 2020-04-08 RX ADMIN — SEVELAMER CARBONATE 1600 MILLIGRAM(S): 2400 POWDER, FOR SUSPENSION ORAL at 09:12

## 2020-04-08 RX ADMIN — MONTELUKAST 10 MILLIGRAM(S): 4 TABLET, CHEWABLE ORAL at 18:36

## 2020-04-08 RX ADMIN — FAMOTIDINE 20 MILLIGRAM(S): 10 INJECTION INTRAVENOUS at 06:20

## 2020-04-08 NOTE — PROGRESS NOTE ADULT - SUBJECTIVE AND OBJECTIVE BOX
Patient was seen and evaluated on dialysis.   HR: 79 (04-08-20 @ 10:40)  BP: 114/68 (04-08-20 @ 10:40)  Wt(kg): --  04-08    146<H>  |  104  |  120<H>  ----------------------------<  127<H>  5.2   |  20<L>  |  8.56<H>    Ca    9.4      08 Apr 2020 07:13  Phos  9.3     04-08  Mg     3.2     04-08    TPro  6.6  /  Alb  3.0<L>  /  TBili  0.4  /  DBili  x   /  AST  57<H>  /  ALT  66<H>  /  AlkPhos  96  04-08    Continue dialysis:   Dialyzer:         QB: 350 ml/min  K bath: 2K+  Goal UF: 1.3  Duration: 3 Hours  Patient is tolerating the procedure well.   Continue full hemodialysis treatment as prescribed.

## 2020-04-08 NOTE — PROGRESS NOTE ADULT - SUBJECTIVE AND OBJECTIVE BOX
INTERVAL HPI/OVERNIGHT EVENTS: o/n: ROSENDO. spoke with son who wanted updates and to pass along message that father is a high functioning person just [place] and needs his glasses. No dementia.   4/8: Patient for dialysis today, had ______ liters removed.    SUBJECTIVE: Patient seen and examined at bedside. Patient tolerating NRB satting 96%. No complaints.     OBJECTIVE:    VITAL SIGNS:  ICU Vital Signs Last 24 Hrs  T(C): 36.1 (08 Apr 2020 06:02), Max: 36.1 (07 Apr 2020 22:48)  T(F): 96.9 (08 Apr 2020 06:02), Max: 96.9 (07 Apr 2020 22:48)  HR: 80 (08 Apr 2020 06:56) (66 - 80)  BP: 120/60 (08 Apr 2020 06:56) (107/56 - 129/61)  BP(mean): --  ABP: --  ABP(mean): --  RR: 20 (08 Apr 2020 06:56) (18 - 20)  SpO2: 96% (08 Apr 2020 06:56) (95% - 96%)        04-07 @ 07:01  -  04-08 @ 07:00  --------------------------------------------------------  IN: 0 mL / OUT: 200 mL / NET: -200 mL      CAPILLARY BLOOD GLUCOSE      POCT Blood Glucose.: 126 mg/dL (08 Apr 2020 09:10)      PHYSICAL EXAM:    General: NAD  HEENT: NC/AT; PERRL, clear conjunctiva  Neck: supple  Respiratory: CTA b/l  Cardiovascular: +S1/S2; RRR  Abdomen: soft, NT/ND; +BS x4  Extremities: WWP, 2+ peripheral pulses b/l; no LE edema  Skin: normal color and turgor; no rash  Neurological:     MEDICATIONS:  MEDICATIONS  (STANDING):  acetaminophen   Tablet .. 975 milliGRAM(s) Oral every 8 hours  albumin human 25% IVPB 50 milliLiter(s) IV Intermittent every 1 hour  allopurinol 100 milliGRAM(s) Oral daily  aspirin enteric coated 81 milliGRAM(s) Oral every 24 hours  atorvastatin 20 milliGRAM(s) Oral at bedtime  dextrose 5%. 1000 milliLiter(s) (50 mL/Hr) IV Continuous <Continuous>  dextrose 50% Injectable 12.5 Gram(s) IV Push once  dextrose 50% Injectable 25 Gram(s) IV Push once  dextrose 50% Injectable 25 Gram(s) IV Push once  doxazosin 8 milliGRAM(s) Oral at bedtime  famotidine    Tablet 20 milliGRAM(s) Oral every 48 hours  heparin  Injectable 5000 Unit(s) SubCutaneous every 8 hours  insulin lispro (HumaLOG) corrective regimen sliding scale   SubCutaneous Before meals and at bedtime  montelukast 10 milliGRAM(s) Oral every 24 hours  PPD  5 Tuberculin Unit(s) Injectable 5 Unit(s) IntraDermal once  sevelamer carbonate 1600 milliGRAM(s) Oral three times a day with meals  sodium zirconium cyclosilicate 10 Gram(s) Oral daily    MEDICATIONS  (PRN):  dextrose 40% Gel 15 Gram(s) Oral once PRN Blood Glucose LESS THAN 70 milliGRAM(s)/deciliter  glucagon  Injectable 1 milliGRAM(s) IntraMuscular once PRN Glucose LESS THAN 70 milligrams/deciliter      ALLERGIES:  Allergies    penicillin (Unknown)  sulfa drugs (Unknown)    Intolerances        LABS:                        9.6    9.48  )-----------( 267      ( 08 Apr 2020 07:13 )             29.9     04-08    146<H>  |  104  |  120<H>  ----------------------------<  127<H>  5.2   |  20<L>  |  8.56<H>    Ca    9.4      08 Apr 2020 07:13  Phos  9.3     04-08  Mg     3.2     04-08    TPro  6.6  /  Alb  3.0<L>  /  TBili  0.4  /  DBili  x   /  AST  57<H>  /  ALT  66<H>  /  AlkPhos  96  04-08    PT/INR - ( 07 Apr 2020 10:46 )   PT: 13.9 sec;   INR: 1.21          PTT - ( 07 Apr 2020 10:46 )  PTT:35.4 sec      Daily COVID labs  Procalcitonin:   D-dimer: D-Dimer Assay, Quantitative: 208 ng/mL DDU (04-08-20 @ 07:13)  D-Dimer Assay, Quantitative: 200 ng/mL DDU (04-07-20 @ 10:46)  D-Dimer Assay, Quantitative: 152 ng/mL DDU (04-06-20 @ 06:57)    ESR:   CRP: C-Reactive Protein, Serum: 10.95 mg/dL (04-08-20 @ 07:13)  C-Reactive Protein, Serum: 10.66 mg/dL (04-07-20 @ 10:46)  C-Reactive Protein, Serum: 10.58 mg/dL (04-06-20 @ 06:57)    LDH:   Ferritin: Ferritin, Serum: 1870 ng/mL (04-08-20 @ 07:13)  Ferritin, Serum: 1905 ng/mL (04-07-20 @ 10:46)  Ferritin, Serum: 2380 ng/mL (04-06-20 @ 06:57)    Lactate: lc  Trop I:   Ck:     Admission COVID Labs  Full T cell subset:   G6PD:   Immunoglobulins panel:   Quantiferon Gold Tb:   Triglyceride level:           RADIOLOGY & ADDITIONAL TESTS: Reviewed.

## 2020-04-08 NOTE — PROGRESS NOTE ADULT - PROBLEM SELECTOR PLAN 3
Pt with hx of CKD now presented with , Cr 6.73, (unknown baseline).   - renal is on board--> HD initiated 4/2/20 via R IJ temp catheter   - Dialysis today 4/8  - PPD read as negative 4/5  - Daily CMP  - HD per nephrology  - Started sevelamer 1600 TID for elevated phos  - c/w lokelma  - c/w Retacrit

## 2020-04-09 LAB
ALBUMIN SERPL ELPH-MCNC: 3.6 G/DL — SIGNIFICANT CHANGE UP (ref 3.3–5)
ALP SERPL-CCNC: 88 U/L — SIGNIFICANT CHANGE UP (ref 40–120)
ALT FLD-CCNC: 54 U/L — HIGH (ref 10–45)
ANION GAP SERPL CALC-SCNC: 22 MMOL/L — HIGH (ref 5–17)
AST SERPL-CCNC: 44 U/L — HIGH (ref 10–40)
BASOPHILS # BLD AUTO: 0.02 K/UL — SIGNIFICANT CHANGE UP (ref 0–0.2)
BASOPHILS NFR BLD AUTO: 0.2 % — SIGNIFICANT CHANGE UP (ref 0–2)
BILIRUB SERPL-MCNC: 0.5 MG/DL — SIGNIFICANT CHANGE UP (ref 0.2–1.2)
BUN SERPL-MCNC: 75 MG/DL — HIGH (ref 7–23)
CALCIUM SERPL-MCNC: 9.8 MG/DL — SIGNIFICANT CHANGE UP (ref 8.4–10.5)
CHLORIDE SERPL-SCNC: 98 MMOL/L — SIGNIFICANT CHANGE UP (ref 96–108)
CO2 SERPL-SCNC: 21 MMOL/L — LOW (ref 22–31)
CREAT SERPL-MCNC: 6.6 MG/DL — HIGH (ref 0.5–1.3)
CRP SERPL-MCNC: 13.83 MG/DL — HIGH (ref 0–0.4)
D DIMER BLD IA.RAPID-MCNC: 221 NG/ML DDU — SIGNIFICANT CHANGE UP
EOSINOPHIL # BLD AUTO: 0.11 K/UL — SIGNIFICANT CHANGE UP (ref 0–0.5)
EOSINOPHIL NFR BLD AUTO: 1 % — SIGNIFICANT CHANGE UP (ref 0–6)
FERRITIN SERPL-MCNC: 1776 NG/ML — HIGH (ref 30–400)
GLUCOSE BLDC GLUCOMTR-MCNC: 104 MG/DL — HIGH (ref 70–99)
GLUCOSE BLDC GLUCOMTR-MCNC: 109 MG/DL — HIGH (ref 70–99)
GLUCOSE BLDC GLUCOMTR-MCNC: 137 MG/DL — HIGH (ref 70–99)
GLUCOSE BLDC GLUCOMTR-MCNC: 143 MG/DL — HIGH (ref 70–99)
GLUCOSE SERPL-MCNC: 104 MG/DL — HIGH (ref 70–99)
HCT VFR BLD CALC: 31.5 % — LOW (ref 39–50)
HGB BLD-MCNC: 9.9 G/DL — LOW (ref 13–17)
IMM GRANULOCYTES NFR BLD AUTO: 7.9 % — HIGH (ref 0–1.5)
LYMPHOCYTES # BLD AUTO: 0.46 K/UL — LOW (ref 1–3.3)
LYMPHOCYTES # BLD AUTO: 4.4 % — LOW (ref 13–44)
MAGNESIUM SERPL-MCNC: 2.8 MG/DL — HIGH (ref 1.6–2.6)
MCHC RBC-ENTMCNC: 31.4 GM/DL — LOW (ref 32–36)
MCHC RBC-ENTMCNC: 32 PG — SIGNIFICANT CHANGE UP (ref 27–34)
MCV RBC AUTO: 101.9 FL — HIGH (ref 80–100)
MONOCYTES # BLD AUTO: 0.47 K/UL — SIGNIFICANT CHANGE UP (ref 0–0.9)
MONOCYTES NFR BLD AUTO: 4.5 % — SIGNIFICANT CHANGE UP (ref 2–14)
NEUTROPHILS # BLD AUTO: 8.65 K/UL — HIGH (ref 1.8–7.4)
NEUTROPHILS NFR BLD AUTO: 82 % — HIGH (ref 43–77)
NRBC # BLD: 0 /100 WBCS — SIGNIFICANT CHANGE UP (ref 0–0)
PHOSPHATE SERPL-MCNC: 8.8 MG/DL — HIGH (ref 2.5–4.5)
PLATELET # BLD AUTO: 238 K/UL — SIGNIFICANT CHANGE UP (ref 150–400)
POTASSIUM SERPL-MCNC: 4.6 MMOL/L — SIGNIFICANT CHANGE UP (ref 3.5–5.3)
POTASSIUM SERPL-SCNC: 4.6 MMOL/L — SIGNIFICANT CHANGE UP (ref 3.5–5.3)
PROT SERPL-MCNC: 6.9 G/DL — SIGNIFICANT CHANGE UP (ref 6–8.3)
RBC # BLD: 3.09 M/UL — LOW (ref 4.2–5.8)
RBC # FLD: 13.8 % — SIGNIFICANT CHANGE UP (ref 10.3–14.5)
SODIUM SERPL-SCNC: 141 MMOL/L — SIGNIFICANT CHANGE UP (ref 135–145)
WBC # BLD: 10.54 K/UL — HIGH (ref 3.8–10.5)
WBC # FLD AUTO: 10.54 K/UL — HIGH (ref 3.8–10.5)

## 2020-04-09 PROCEDURE — 99232 SBSQ HOSP IP/OBS MODERATE 35: CPT

## 2020-04-09 PROCEDURE — 99233 SBSQ HOSP IP/OBS HIGH 50: CPT | Mod: CS

## 2020-04-09 RX ADMIN — HEPARIN SODIUM 5000 UNIT(S): 5000 INJECTION INTRAVENOUS; SUBCUTANEOUS at 13:52

## 2020-04-09 RX ADMIN — Medication 8 MILLIGRAM(S): at 22:50

## 2020-04-09 RX ADMIN — Medication 975 MILLIGRAM(S): at 13:52

## 2020-04-09 RX ADMIN — ATORVASTATIN CALCIUM 20 MILLIGRAM(S): 80 TABLET, FILM COATED ORAL at 22:51

## 2020-04-09 RX ADMIN — SODIUM ZIRCONIUM CYCLOSILICATE 10 GRAM(S): 10 POWDER, FOR SUSPENSION ORAL at 11:07

## 2020-04-09 RX ADMIN — Medication 81 MILLIGRAM(S): at 22:51

## 2020-04-09 RX ADMIN — Medication 975 MILLIGRAM(S): at 07:27

## 2020-04-09 RX ADMIN — MONTELUKAST 10 MILLIGRAM(S): 4 TABLET, CHEWABLE ORAL at 17:22

## 2020-04-09 RX ADMIN — Medication 975 MILLIGRAM(S): at 22:51

## 2020-04-09 RX ADMIN — SEVELAMER CARBONATE 1600 MILLIGRAM(S): 2400 POWDER, FOR SUSPENSION ORAL at 11:53

## 2020-04-09 RX ADMIN — HEPARIN SODIUM 5000 UNIT(S): 5000 INJECTION INTRAVENOUS; SUBCUTANEOUS at 07:27

## 2020-04-09 RX ADMIN — SEVELAMER CARBONATE 1600 MILLIGRAM(S): 2400 POWDER, FOR SUSPENSION ORAL at 07:27

## 2020-04-09 RX ADMIN — HEPARIN SODIUM 5000 UNIT(S): 5000 INJECTION INTRAVENOUS; SUBCUTANEOUS at 22:50

## 2020-04-09 RX ADMIN — Medication 100 MILLIGRAM(S): at 11:07

## 2020-04-09 RX ADMIN — SEVELAMER CARBONATE 1600 MILLIGRAM(S): 2400 POWDER, FOR SUSPENSION ORAL at 17:21

## 2020-04-09 NOTE — PROGRESS NOTE ADULT - SUBJECTIVE AND OBJECTIVE BOX
CC: ESRD    INTERVAL HISTORY:    * O2 decreased from NRB to VM. * K contolled. * BP controlled. * Last dialyzed yesterday.    ROS: By current guidelines, to minimize exposure and to preserve PPE, the ROS has been deferred and I will rely upon the ROS of the primary team.     PAST MEDICAL & SURGICAL HISTORY:  Gout  HLD (hyperlipidemia)  HTN (hypertension)  HTN, age 0-18  CKD (chronic kidney disease)  DM (diabetes mellitus)    PHYSICAL EXAM:  T(C): 35.9 (2020 06:02), Max: 36.5 (2020 13:40)  HR: 80 (2020 08:45)  BP: 115/63 (2020 08:45) (109/60 - 134/64)  RR: 22 (2020 08:45)  SpO2: 97% (2020 08:45)      2020 07:01  -  2020 07:00  --------------------------------------------------------  IN:  Total IN: 0 mL    OUT:    Other: 1200 mL  Total OUT: 1200 mL    Total NET: -1200 mL        Weight 82.2 (2020 16:26)    By current guidelines, to minimize exposure and to preserve PPE, the physical exam has been deferred and I will rely upon the exam of the primary team.     MEDICATIONS  (STANDING):  acetaminophen   Tablet .. 975 milliGRAM(s) Oral every 8 hours  allopurinol 100 milliGRAM(s) Oral daily  aspirin enteric coated 81 milliGRAM(s) Oral every 24 hours  atorvastatin 20 milliGRAM(s) Oral at bedtime  dextrose 5%. 1000 milliLiter(s) (50 mL/Hr) IV Continuous <Continuous>  dextrose 50% Injectable 12.5 Gram(s) IV Push once  dextrose 50% Injectable 25 Gram(s) IV Push once  dextrose 50% Injectable 25 Gram(s) IV Push once  doxazosin 8 milliGRAM(s) Oral at bedtime  famotidine    Tablet 20 milliGRAM(s) Oral every 48 hours  heparin  Injectable 5000 Unit(s) SubCutaneous every 8 hours  insulin lispro (HumaLOG) corrective regimen sliding scale   SubCutaneous Before meals and at bedtime  montelukast 10 milliGRAM(s) Oral every 24 hours  PPD  5 Tuberculin Unit(s) Injectable 5 Unit(s) IntraDermal once  sevelamer carbonate 1600 milliGRAM(s) Oral three times a day with meals  sodium zirconium cyclosilicate 10 Gram(s) Oral daily    MEDICATIONS  (PRN):  dextrose 40% Gel 15 Gram(s) Oral once PRN Blood Glucose LESS THAN 70 milliGRAM(s)/deciliter  glucagon  Injectable 1 milliGRAM(s) IntraMuscular once PRN Glucose LESS THAN 70 milligrams/deciliter    DATA:  141    |  98     |  75<H>  ----------------------------<  104<H>  Ca:9.8   (2020 07:19)  4.6     |  21<L>  |  6.60<H>      eGFR if Non : 7 <L>  eGFR if : 8 <L>    TPro  6.9    /  Alb  3.6    /  TBili  0.5    /  DBili  x      /  AST  44<H>  /  ALT  54<H>  /  AlkPhos  88     2020 07:19    SCr 6.60 [2020 07:19]  SCr 8.56 [2020 07:13]  SCr 7.94 [2020 10:46]  SCr 8.36 [2020 06:57]  SCr 7.45 [2020 06:25]                          9.9<L>  10.54<H> )-----------( 238      ( 2020 07:19 )             31.5<L>    Phos:8.8 mg/dL<H> M.8 mg/dL<H> PTH:-- Uric acid:-- Serum Osm:--  Ferritin:1776 ng/mL<H> Iron:-- TIBC:-- Tsat:--  B12:-- TSH:-- (2020 07:19)

## 2020-04-09 NOTE — PROGRESS NOTE ADULT - SUBJECTIVE AND OBJECTIVE BOX
INTERVAL HPI/OVERNIGHT EVENTS: ROSENDO. Successful deescalation of oxygen requirements. Tolerating venturi mask. 1.2L removed yesterday during dialysis    SUBJECTIVE: Patient seen and examined at bedside. No complaints at this time.     OBJECTIVE:  VITAL SIGNS:  ICU Vital Signs Last 24 Hrs  T(C): 35.9 (09 Apr 2020 06:02), Max: 36.5 (08 Apr 2020 13:40)  T(F): 96.7 (09 Apr 2020 06:02), Max: 97.7 (08 Apr 2020 13:40)  HR: 80 (09 Apr 2020 08:45) (75 - 89)  BP: 115/63 (09 Apr 2020 08:45) (109/60 - 134/64)  BP(mean): --  ABP: --  ABP(mean): --  RR: 22 (09 Apr 2020 08:45) (18 - 25)  SpO2: 97% (09 Apr 2020 08:45) (93% - 97%)      04-08 @ 07:01  -  04-09 @ 07:00  --------------------------------------------------------  IN: 0 mL / OUT: 1200 mL / NET: -1200 mL      CAPILLARY BLOOD GLUCOSE      POCT Blood Glucose.: 109 mg/dL (09 Apr 2020 06:47)      PHYSICAL EXAM:  General: NAD  HEENT: NC/AT; PERRL, clear conjunctiva  Neck: supple  Respiratory: CTA b/l, non-labored breathing  Cardiovascular: +S1/S2; RRR  Abdomen: soft, NT/ND; +BS x4  Extremities: WWP, 2+ peripheral pulses b/l; no LE edema  Skin: normal color and turgor; no rash  Neurological: AAOx2    MEDICATIONS:  MEDICATIONS  (STANDING):  acetaminophen   Tablet .. 975 milliGRAM(s) Oral every 8 hours  allopurinol 100 milliGRAM(s) Oral daily  aspirin enteric coated 81 milliGRAM(s) Oral every 24 hours  atorvastatin 20 milliGRAM(s) Oral at bedtime  dextrose 5%. 1000 milliLiter(s) (50 mL/Hr) IV Continuous <Continuous>  dextrose 50% Injectable 12.5 Gram(s) IV Push once  dextrose 50% Injectable 25 Gram(s) IV Push once  dextrose 50% Injectable 25 Gram(s) IV Push once  doxazosin 8 milliGRAM(s) Oral at bedtime  famotidine    Tablet 20 milliGRAM(s) Oral every 48 hours  heparin  Injectable 5000 Unit(s) SubCutaneous every 8 hours  insulin lispro (HumaLOG) corrective regimen sliding scale   SubCutaneous Before meals and at bedtime  montelukast 10 milliGRAM(s) Oral every 24 hours  PPD  5 Tuberculin Unit(s) Injectable 5 Unit(s) IntraDermal once  sevelamer carbonate 1600 milliGRAM(s) Oral three times a day with meals  sodium zirconium cyclosilicate 10 Gram(s) Oral daily    MEDICATIONS  (PRN):  dextrose 40% Gel 15 Gram(s) Oral once PRN Blood Glucose LESS THAN 70 milliGRAM(s)/deciliter  glucagon  Injectable 1 milliGRAM(s) IntraMuscular once PRN Glucose LESS THAN 70 milligrams/deciliter      ALLERGIES:  Allergies    penicillin (Unknown)  sulfa drugs (Unknown)    Intolerances        LABS:                        9.9    10.54 )-----------( 238      ( 09 Apr 2020 07:19 )             31.5     04-09    141  |  98  |  75<H>  ----------------------------<  104<H>  4.6   |  21<L>  |  6.60<H>    Ca    9.8      09 Apr 2020 07:19  Phos  8.8     04-09  Mg     2.8     04-09    TPro  6.9  /  Alb  3.6  /  TBili  0.5  /  DBili  x   /  AST  44<H>  /  ALT  54<H>  /  AlkPhos  88  04-09          Daily COVID labs  Procalcitonin:   D-dimer: D-Dimer Assay, Quantitative: 221 ng/mL DDU (04-09-20 @ 07:19)  D-Dimer Assay, Quantitative: 208 ng/mL DDU (04-08-20 @ 07:13)  D-Dimer Assay, Quantitative: 200 ng/mL DDU (04-07-20 @ 10:46)    ESR:   CRP: C-Reactive Protein, Serum: 13.83 mg/dL (04-09-20 @ 07:19)  C-Reactive Protein, Serum: 10.95 mg/dL (04-08-20 @ 07:13)  C-Reactive Protein, Serum: 10.66 mg/dL (04-07-20 @ 10:46)    LDH:   Ferritin: Ferritin, Serum: 1776 ng/mL (04-09-20 @ 07:19)  Ferritin, Serum: 1870 ng/mL (04-08-20 @ 07:13)  Ferritin, Serum: 1905 ng/mL (04-07-20 @ 10:46)    Lactate: lc  Trop I:   Ck:     Admission COVID Labs  Full T cell subset:   G6PD:   Immunoglobulins panel:   Quantiferon Gold Tb:   Triglyceride level:           RADIOLOGY & ADDITIONAL TESTS: Reviewed.

## 2020-04-09 NOTE — PROGRESS NOTE ADULT - ASSESSMENT
84 year old with CKD stage 4 and GRACIE on CKD, now likely ESRD, admitted with Covid. Lower O2 requirements today.     Suggest:    1. HD tomorrow, UF as tolerated.  2. Retacrit for anemia.  3. Follow BP. Resume norvasc if needed for BP.     Please note:    * Dialysis resources are limited during the Covid-19 public health crisis.  * Patients will not be dialyzed on a regular schedule. Need for dialysis will be reassessed daily.   * To control potassium, please ensure all dialysis patients with K > 5 are on Lokelma 10 grams daily.  * Please call us with any hyperkalemia (K > 5.5 - 6) or worsening respiratory failure that is primarily due to fluid overload. Please treat hyperkalemia aggressively (with lokelma instead of kayexalate; and glucose/insulin, lasix 120 mg IV, albuterol, and calcium gluconate when appropriate).  * Please fluid restrict patient to 1L and use intravenous diuretics for fluid overload, when possible.    Please call with any questions.    Presley Payne MD, FACP, FASN | kidney.[facility]  Nephrology, Hypertension, and Internal Medicine  Mobile: [phone] (Daytime Hours Only)  Office/Answering Service: [phone]  Asst. Prof. of Medicine, [employer] at [facility] - Nephrology at [address]  [address], [address], [place], [place]

## 2020-04-10 LAB
ALBUMIN SERPL ELPH-MCNC: 3.6 G/DL — SIGNIFICANT CHANGE UP (ref 3.3–5)
ALP SERPL-CCNC: 91 U/L — SIGNIFICANT CHANGE UP (ref 40–120)
ALT FLD-CCNC: 45 U/L — SIGNIFICANT CHANGE UP (ref 10–45)
ANION GAP SERPL CALC-SCNC: 23 MMOL/L — HIGH (ref 5–17)
AST SERPL-CCNC: 33 U/L — SIGNIFICANT CHANGE UP (ref 10–40)
BASOPHILS # BLD AUTO: 0.03 K/UL — SIGNIFICANT CHANGE UP (ref 0–0.2)
BASOPHILS NFR BLD AUTO: 0.3 % — SIGNIFICANT CHANGE UP (ref 0–2)
BILIRUB SERPL-MCNC: 0.3 MG/DL — SIGNIFICANT CHANGE UP (ref 0.2–1.2)
BUN SERPL-MCNC: 105 MG/DL — HIGH (ref 7–23)
CALCIUM SERPL-MCNC: 9.9 MG/DL — SIGNIFICANT CHANGE UP (ref 8.4–10.5)
CHLORIDE SERPL-SCNC: 94 MMOL/L — LOW (ref 96–108)
CO2 SERPL-SCNC: 22 MMOL/L — SIGNIFICANT CHANGE UP (ref 22–31)
CREAT SERPL-MCNC: 8.16 MG/DL — HIGH (ref 0.5–1.3)
CRP SERPL-MCNC: 11.45 MG/DL — HIGH (ref 0–0.4)
D DIMER BLD IA.RAPID-MCNC: 164 NG/ML DDU — SIGNIFICANT CHANGE UP
EOSINOPHIL # BLD AUTO: 0.13 K/UL — SIGNIFICANT CHANGE UP (ref 0–0.5)
EOSINOPHIL NFR BLD AUTO: 1.2 % — SIGNIFICANT CHANGE UP (ref 0–6)
FERRITIN SERPL-MCNC: 1992 NG/ML — HIGH (ref 30–400)
GLUCOSE BLDC GLUCOMTR-MCNC: 110 MG/DL — HIGH (ref 70–99)
GLUCOSE BLDC GLUCOMTR-MCNC: 113 MG/DL — HIGH (ref 70–99)
GLUCOSE BLDC GLUCOMTR-MCNC: 125 MG/DL — HIGH (ref 70–99)
GLUCOSE BLDC GLUCOMTR-MCNC: 265 MG/DL — HIGH (ref 70–99)
GLUCOSE BLDC GLUCOMTR-MCNC: 265 MG/DL — HIGH (ref 70–99)
GLUCOSE SERPL-MCNC: 111 MG/DL — HIGH (ref 70–99)
HCT VFR BLD CALC: 31.4 % — LOW (ref 39–50)
HGB BLD-MCNC: 10.2 G/DL — LOW (ref 13–17)
IMM GRANULOCYTES NFR BLD AUTO: 7.3 % — HIGH (ref 0–1.5)
LYMPHOCYTES # BLD AUTO: 0.52 K/UL — LOW (ref 1–3.3)
LYMPHOCYTES # BLD AUTO: 4.7 % — LOW (ref 13–44)
MAGNESIUM SERPL-MCNC: 3 MG/DL — HIGH (ref 1.6–2.6)
MCHC RBC-ENTMCNC: 32.5 GM/DL — SIGNIFICANT CHANGE UP (ref 32–36)
MCHC RBC-ENTMCNC: 32.5 PG — SIGNIFICANT CHANGE UP (ref 27–34)
MCV RBC AUTO: 100 FL — SIGNIFICANT CHANGE UP (ref 80–100)
MONOCYTES # BLD AUTO: 0.46 K/UL — SIGNIFICANT CHANGE UP (ref 0–0.9)
MONOCYTES NFR BLD AUTO: 4.2 % — SIGNIFICANT CHANGE UP (ref 2–14)
NEUTROPHILS # BLD AUTO: 9.03 K/UL — HIGH (ref 1.8–7.4)
NEUTROPHILS NFR BLD AUTO: 82.3 % — HIGH (ref 43–77)
NRBC # BLD: 0 /100 WBCS — SIGNIFICANT CHANGE UP (ref 0–0)
PHOSPHATE SERPL-MCNC: 12 MG/DL — HIGH (ref 2.5–4.5)
PLATELET # BLD AUTO: 268 K/UL — SIGNIFICANT CHANGE UP (ref 150–400)
POTASSIUM SERPL-MCNC: 4.4 MMOL/L — SIGNIFICANT CHANGE UP (ref 3.5–5.3)
POTASSIUM SERPL-SCNC: 4.4 MMOL/L — SIGNIFICANT CHANGE UP (ref 3.5–5.3)
PROT SERPL-MCNC: 7.2 G/DL — SIGNIFICANT CHANGE UP (ref 6–8.3)
RBC # BLD: 3.14 M/UL — LOW (ref 4.2–5.8)
RBC # FLD: 13.6 % — SIGNIFICANT CHANGE UP (ref 10.3–14.5)
SODIUM SERPL-SCNC: 139 MMOL/L — SIGNIFICANT CHANGE UP (ref 135–145)
WBC # BLD: 10.97 K/UL — HIGH (ref 3.8–10.5)
WBC # FLD AUTO: 10.97 K/UL — HIGH (ref 3.8–10.5)

## 2020-04-10 PROCEDURE — 99233 SBSQ HOSP IP/OBS HIGH 50: CPT | Mod: CS

## 2020-04-10 PROCEDURE — 90935 HEMODIALYSIS ONE EVALUATION: CPT

## 2020-04-10 RX ORDER — ALBUMIN HUMAN 25 %
50 VIAL (ML) INTRAVENOUS
Refills: 0 | Status: COMPLETED | OUTPATIENT
Start: 2020-04-10 | End: 2020-04-10

## 2020-04-10 RX ADMIN — Medication 50 MILLILITER(S): at 10:10

## 2020-04-10 RX ADMIN — ATORVASTATIN CALCIUM 20 MILLIGRAM(S): 80 TABLET, FILM COATED ORAL at 21:45

## 2020-04-10 RX ADMIN — Medication 975 MILLIGRAM(S): at 07:27

## 2020-04-10 RX ADMIN — Medication 100 MILLIGRAM(S): at 12:53

## 2020-04-10 RX ADMIN — HEPARIN SODIUM 5000 UNIT(S): 5000 INJECTION INTRAVENOUS; SUBCUTANEOUS at 21:44

## 2020-04-10 RX ADMIN — SODIUM ZIRCONIUM CYCLOSILICATE 10 GRAM(S): 10 POWDER, FOR SUSPENSION ORAL at 16:51

## 2020-04-10 RX ADMIN — Medication 975 MILLIGRAM(S): at 21:45

## 2020-04-10 RX ADMIN — SEVELAMER CARBONATE 1600 MILLIGRAM(S): 2400 POWDER, FOR SUSPENSION ORAL at 07:32

## 2020-04-10 RX ADMIN — MONTELUKAST 10 MILLIGRAM(S): 4 TABLET, CHEWABLE ORAL at 16:53

## 2020-04-10 RX ADMIN — FAMOTIDINE 20 MILLIGRAM(S): 10 INJECTION INTRAVENOUS at 07:27

## 2020-04-10 RX ADMIN — SEVELAMER CARBONATE 1600 MILLIGRAM(S): 2400 POWDER, FOR SUSPENSION ORAL at 12:54

## 2020-04-10 RX ADMIN — SEVELAMER CARBONATE 1600 MILLIGRAM(S): 2400 POWDER, FOR SUSPENSION ORAL at 16:52

## 2020-04-10 RX ADMIN — Medication 8 MILLIGRAM(S): at 21:45

## 2020-04-10 RX ADMIN — Medication 81 MILLIGRAM(S): at 21:45

## 2020-04-10 RX ADMIN — HEPARIN SODIUM 5000 UNIT(S): 5000 INJECTION INTRAVENOUS; SUBCUTANEOUS at 16:19

## 2020-04-10 RX ADMIN — Medication 975 MILLIGRAM(S): at 15:00

## 2020-04-10 RX ADMIN — HEPARIN SODIUM 5000 UNIT(S): 5000 INJECTION INTRAVENOUS; SUBCUTANEOUS at 07:30

## 2020-04-10 NOTE — PROGRESS NOTE ADULT - SUBJECTIVE AND OBJECTIVE BOX
CC: ESRD    INTERVAL HISTORY:    Seen at dialysis. No complaints. K controlled.     ROS: By current guidelines, to minimize exposure and to preserve PPE, the ROS has been deferred and I will rely upon the ROS of the primary team.     PAST MEDICAL & SURGICAL HISTORY:  Gout  HLD (hyperlipidemia)  HTN (hypertension)  HTN, age 0-18  CKD (chronic kidney disease)  DM (diabetes mellitus)    PHYSICAL EXAM:  T(C): 36.2 (10 Apr 2020 13:48), Max: 36.3 (10 Apr 2020 09:30)  HR: 82 (10 Apr 2020 13:10)  BP: 134/75 (10 Apr 2020 13:10) (114/64 - 145/68)  RR: 20 (10 Apr 2020 13:10)  SpO2: 95% (10 Apr 2020 13:10)      Weight     By current guidelines, to minimize exposure and to preserve PPE, the physical exam has been deferred and I will rely upon the exam of the primary team.     MEDICATIONS  (STANDING):  acetaminophen   Tablet .. 975 milliGRAM(s) Oral every 8 hours  albumin human 25% IVPB 50 milliLiter(s) IV Intermittent every 1 hour  allopurinol 100 milliGRAM(s) Oral daily  aspirin enteric coated 81 milliGRAM(s) Oral every 24 hours  atorvastatin 20 milliGRAM(s) Oral at bedtime  dextrose 5%. 1000 milliLiter(s) (50 mL/Hr) IV Continuous <Continuous>  dextrose 50% Injectable 12.5 Gram(s) IV Push once  dextrose 50% Injectable 25 Gram(s) IV Push once  dextrose 50% Injectable 25 Gram(s) IV Push once  doxazosin 8 milliGRAM(s) Oral at bedtime  famotidine    Tablet 20 milliGRAM(s) Oral every 48 hours  heparin  Injectable 5000 Unit(s) SubCutaneous every 8 hours  insulin lispro (HumaLOG) corrective regimen sliding scale   SubCutaneous Before meals and at bedtime  montelukast 10 milliGRAM(s) Oral every 24 hours  PPD  5 Tuberculin Unit(s) Injectable 5 Unit(s) IntraDermal once  sevelamer carbonate 1600 milliGRAM(s) Oral three times a day with meals  sodium zirconium cyclosilicate 10 Gram(s) Oral daily    MEDICATIONS  (PRN):  dextrose 40% Gel 15 Gram(s) Oral once PRN Blood Glucose LESS THAN 70 milliGRAM(s)/deciliter  glucagon  Injectable 1 milliGRAM(s) IntraMuscular once PRN Glucose LESS THAN 70 milligrams/deciliter    DATA:  139    |  94<L>  |  105<H>  ----------------------------<  111<H>  Ca:9.9   (10 Apr 2020 06:51)  4.4     |  22     |  8.16<H>      eGFR if Non : 6 <L>  eGFR if : 6 <L>    TPro  7.2    /  Alb  3.6    /  TBili  0.3    /  DBili  x      /  AST  33     /  ALT  45     /  AlkPhos  91     10 Apr 2020 06:51    SCr 8.16 [10 Apr 2020 06:51]  SCr 6.60 [09 Apr 2020 07:19]  SCr 8.56 [08 Apr 2020 07:13]  SCr 7.94 [07 Apr 2020 10:46]  SCr 8.36 [06 Apr 2020 06:57]                          10.2<L>  10.97<H> )-----------( 268      ( 10 Apr 2020 06:51 )             31.4<L>    Phos:12.0 mg/dL<H> Mg:3.0 mg/dL<H> PTH:-- Uric acid:-- Serum Osm:--  Ferritin:1992 ng/mL<H> Iron:-- TIBC:-- Tsat:--  B12:-- TSH:-- (10 Apr 2020 06:51)

## 2020-04-10 NOTE — PROGRESS NOTE ADULT - PROBLEM SELECTOR PLAN 2
Likely viral pneumonia 2/2 to COVID-19 infection  -CXR showed Bilateral patchy/hazy opacities with a mid/lower lung zone   -COVID treatment initiated, as above  -c/w Venti, descalate as tolerated  -monitor for hypoxemia

## 2020-04-10 NOTE — PROGRESS NOTE ADULT - ASSESSMENT
84 year old with CKD stage 4 and GRACIE on CKD, now likely ESRD, admitted with Covid. Lower O2 requirements today.     Suggest:    1. HD today, UF as tolerated.  2. Retacrit for anemia.  3. Follow BP. Resume norvasc if needed for BP.     Please note:    * Dialysis resources are limited during the Covid-19 public health crisis.  * Patients will not be dialyzed on a regular schedule. Need for dialysis will be reassessed daily.   * To control potassium, please ensure all dialysis patients with K > 5 are on Lokelma 10 grams daily.  * Please call us with any hyperkalemia (K > 5.5 - 6) or worsening respiratory failure that is primarily due to fluid overload. Please treat hyperkalemia aggressively (with lokelma instead of kayexalate; and glucose/insulin, lasix 120 mg IV, albuterol, and calcium gluconate when appropriate).  * Please fluid restrict patient to 1L and use intravenous diuretics for fluid overload, when possible.    Please call with any questions.    Presley Payne MD, FACP, FASN | kidney.[facility]  Nephrology, Hypertension, and Internal Medicine  Mobile: [phone] (Daytime Hours Only)  Office/Answering Service: [phone]  Asst. Prof. of Medicine, [employer] [facility] [employer] at [facility] - Nephrology at [address]  [address], [address], [place], [place]

## 2020-04-10 NOTE — PROGRESS NOTE ADULT - PROBLEM SELECTOR PLAN 3
Pt with hx of CKD now presented with , Cr 6.73, (unknown baseline).   - renal is on board--> HD initiated 4/2/20 via R IJ temp catheter   - Dialysis today 4/10  - PPD read as negative 4/5  - Daily CMP  - HD per nephrology  - Started sevelamer 1600 TID for elevated phos  - c/w lokelma  - c/w Retacrit qWeek if hgB <10

## 2020-04-10 NOTE — CHART NOTE - NSCHARTNOTEFT_GEN_A_CORE
Admitting Diagnosis:   Patient is a 84y old  Male who presents with a chief complaint of fatigue (09 Apr 2020 11:46)      PAST MEDICAL & SURGICAL HISTORY:  Gout  HLD (hyperlipidemia)  HTN (hypertension)  CKD (chronic kidney disease)  DM (diabetes mellitus)      Current Nutrition Order: DASH/TLC, Consistent CHO + Snack, Renal  PO Intake: Good (%) [   ]  Fair (50-75%) [   ] Poor (<25%) [   ]  GI Issues: no recent complaints of n/v/d/c   Pain: no recent complaints of pain   Skin Integrity: 1+ BL ankle edema / R upper chest permacath, L chest cardiac monitoring device     Labs:   04-10    139  |  94<L>  |  105<H>  ----------------------------<  111<H>  4.4   |  22  |  8.16<H>    Ca    9.9      10 Apr 2020 06:51  Phos  12.0     04-10  Mg     3.0     04-10    TPro  7.2  /  Alb  3.6  /  TBili  0.3  /  DBili  x   /  AST  33  /  ALT  45  /  AlkPhos  91  04-10    CAPILLARY BLOOD GLUCOSE      POCT Blood Glucose.: 265 mg/dL (10 Apr 2020 06:45)  POCT Blood Glucose.: 104 mg/dL (09 Apr 2020 22:13)  POCT Blood Glucose.: 137 mg/dL (09 Apr 2020 16:27)  POCT Blood Glucose.: 143 mg/dL (09 Apr 2020 11:21)      Medications:  MEDICATIONS  (STANDING):  acetaminophen   Tablet .. 975 milliGRAM(s) Oral every 8 hours  albumin human 25% IVPB 50 milliLiter(s) IV Intermittent every 1 hour  allopurinol 100 milliGRAM(s) Oral daily  aspirin enteric coated 81 milliGRAM(s) Oral every 24 hours  atorvastatin 20 milliGRAM(s) Oral at bedtime  dextrose 5%. 1000 milliLiter(s) (50 mL/Hr) IV Continuous <Continuous>  dextrose 50% Injectable 12.5 Gram(s) IV Push once  dextrose 50% Injectable 25 Gram(s) IV Push once  dextrose 50% Injectable 25 Gram(s) IV Push once  doxazosin 8 milliGRAM(s) Oral at bedtime  famotidine    Tablet 20 milliGRAM(s) Oral every 48 hours  heparin  Injectable 5000 Unit(s) SubCutaneous every 8 hours  insulin lispro (HumaLOG) corrective regimen sliding scale   SubCutaneous Before meals and at bedtime  montelukast 10 milliGRAM(s) Oral every 24 hours  PPD  5 Tuberculin Unit(s) Injectable 5 Unit(s) IntraDermal once  sevelamer carbonate 1600 milliGRAM(s) Oral three times a day with meals  sodium zirconium cyclosilicate 10 Gram(s) Oral daily    MEDICATIONS  (PRN):  dextrose 40% Gel 15 Gram(s) Oral once PRN Blood Glucose LESS THAN 70 milliGRAM(s)/deciliter  glucagon  Injectable 1 milliGRAM(s) IntraMuscular once PRN Glucose LESS THAN 70 milligrams/deciliter      No EMR ht, per son 69in ( pounds +/-10%)  (4/2) 181 pounds  (4/3) 177.9 pounds / 180.3 pounds   (4/8) 170.6 pounds / 173.2 pounds (106-108% %IBW)   pounds, + wt loss is noted from UBW, Please see Below   Nutrition Focused Physical Exam: Completed [   ]  Unable to complete [  x ]    Estimated energy needs:   IBW used for EER d/t varying EMR wts in pt that is now on HD  Needs calculated for age and energy increased secondary to COVID+/infection/inflammation/HD and concern for malnutrition  Energy: ~2200-2400kcal (30-33cal/kg)  Protein: ~110g pro (1.5g/kg pro)  Fluids per team    Subjective:   85yo M, past medical history significant for DM2, HTN, HLD, CKD4. Pt presents with fatigue, poor appetite, cough and malaise; Pt with Likely viral pneumonia 2/2 to COVID-19 infection, +results noted. Pt with hx of CKD, now ESRD, HD initiated 4/2 via R IJ temp catheter - orders for phosbinder and lokemla noted.  Unable to conduct a face to face interview or nutrition-focused physical exam due to limited contact restrictions related to the pt's medical condition and isolation precautions, attempted to reach pt via phone as pt is not noted as confused however unable to reach. During time of RD IA, RD Spoke with family who reported pt drastic decline over the last two weeks, had been with good appetite/intake at baseline consuming a diet low in sugar and potassium. Pt with very minimal intake x2 week prior to admission; sleeping the majority of the days. Poor PO intake during time of RD IA. Pt requires encouragement and redirection during meals given current mental status.   Pt currentlty ordered for DASH consCHO with snack renal diet 4/6.     MALNUTRITION: Inadequate energy intake prior to admission and ~18% weight loss x2 weeks are concerning for severe malnutrition in the setting of acute illness. Please see Malnutrition note 4/6.    Nutrition Diagnosis: Increased nutrient needs (energy) r/t increased demand for nutrient AEB COVID+/infection/inflammation   ON GOING AT THIS TIME  Goal: Pt to consistently meet at least 75% estimated nutrient needs     Recommendations:      Education: n/a   Risk Level: High [  ] Moderate [   ] Low [   ]. Admitting Diagnosis:   Patient is a 84y old  Male who presents with a chief complaint of fatigue (09 Apr 2020 11:46)      PAST MEDICAL & SURGICAL HISTORY:  Gout  HLD (hyperlipidemia)  HTN (hypertension)  CKD (chronic kidney disease)  DM (diabetes mellitus)      Current Nutrition Order: DASH/TLC, Consistent CHO + Snack, Renal  PO Intake: Good (%) [   ]  Fair (50-75%) [   ] Poor (<25%) [ X ]  GI Issues: Last BM noted in EMR from 4/4   Pain: no recent complaints of pain   Skin Integrity: 1+ BL ankle edema / R upper chest permacath, L chest cardiac monitoring device     Labs:   04-10    139  |  94<L>  |  105<H>  ----------------------------<  111<H>  4.4   |  22  |  8.16<H>    Ca    9.9      10 Apr 2020 06:51  Phos  12.0     04-10  Mg     3.0     04-10    TPro  7.2  /  Alb  3.6  /  TBili  0.3  /  DBili  x   /  AST  33  /  ALT  45  /  AlkPhos  91  04-10    CAPILLARY BLOOD GLUCOSE      POCT Blood Glucose.: 265 mg/dL (10 Apr 2020 06:45)  POCT Blood Glucose.: 104 mg/dL (09 Apr 2020 22:13)  POCT Blood Glucose.: 137 mg/dL (09 Apr 2020 16:27)  POCT Blood Glucose.: 143 mg/dL (09 Apr 2020 11:21)      Medications:  MEDICATIONS  (STANDING):  acetaminophen   Tablet .. 975 milliGRAM(s) Oral every 8 hours  albumin human 25% IVPB 50 milliLiter(s) IV Intermittent every 1 hour  allopurinol 100 milliGRAM(s) Oral daily  aspirin enteric coated 81 milliGRAM(s) Oral every 24 hours  atorvastatin 20 milliGRAM(s) Oral at bedtime  dextrose 5%. 1000 milliLiter(s) (50 mL/Hr) IV Continuous <Continuous>  dextrose 50% Injectable 12.5 Gram(s) IV Push once  dextrose 50% Injectable 25 Gram(s) IV Push once  dextrose 50% Injectable 25 Gram(s) IV Push once  doxazosin 8 milliGRAM(s) Oral at bedtime  famotidine    Tablet 20 milliGRAM(s) Oral every 48 hours  heparin  Injectable 5000 Unit(s) SubCutaneous every 8 hours  insulin lispro (HumaLOG) corrective regimen sliding scale   SubCutaneous Before meals and at bedtime  montelukast 10 milliGRAM(s) Oral every 24 hours  PPD  5 Tuberculin Unit(s) Injectable 5 Unit(s) IntraDermal once  sevelamer carbonate 1600 milliGRAM(s) Oral three times a day with meals  sodium zirconium cyclosilicate 10 Gram(s) Oral daily    MEDICATIONS  (PRN):  dextrose 40% Gel 15 Gram(s) Oral once PRN Blood Glucose LESS THAN 70 milliGRAM(s)/deciliter  glucagon  Injectable 1 milliGRAM(s) IntraMuscular once PRN Glucose LESS THAN 70 milligrams/deciliter      No EMR ht, per son 69in ( pounds +/-10%)  (4/2) 181 pounds  (4/3) 177.9 pounds / 180.3 pounds   (4/8) 170.6 pounds / 173.2 pounds (106-108% %IBW)   pounds, + wt loss is noted from UBW, Please see Below   Nutrition Focused Physical Exam: Completed [   ]  Unable to complete [  x ]    Estimated energy needs:   IBW used for EER d/t varying EMR wts in pt that is now on HD  Needs calculated for age and energy increased secondary to COVID+/infection/inflammation/HD and concern for malnutrition  Energy: ~2200-2400kcal (30-33cal/kg)  Protein: ~110g pro (1.5g/kg pro)  Fluids per team    Subjective:   83yo M, past medical history significant for DM2, HTN, HLD, CKD4. Pt presents with fatigue, poor appetite, cough and malaise; Pt with Likely viral pneumonia 2/2 to COVID-19 infection, +results noted. Pt with hx of CKD, now ESRD, HD initiated 4/2 via R IJ temp catheter - orders for phosbinder and lokemla noted.  Unable to conduct a face to face interview or nutrition-focused physical exam due to limited contact restrictions related to the pt's medical condition and isolation precautions, attempted to reach pt via phone as pt is not noted as confused however unable to reach. Spoke with 5UR RN today. During time of RD IA RD Spoke with family who reported pt drastic decline over the last two weeks, had been with good appetite/intake at baseline consuming a diet low in sugar and potassium. Pt with very minimal intake x2 week prior to admission; sleeping the majority of the days. Poor PO intake during time of RD IA. Pt requires encouragement and redirection during meals given current mental status.   Pt currently ordered for DASH consCHO with snack renal diet 4/6. RN reports pt with very limited PO intake at this time. RN reports pt on venturi mask and is able to come off for some period of time for meals. RN feels pt to benefit from softer diet to provide ease during meals.  Please see below for nutritions recommendations. Pending order placed. Recs made with team.     MALNUTRITION: Inadequate energy intake prior to admission and ~18% weight loss x2 weeks are concerning for severe malnutrition in the setting of acute illness. Please see Malnutrition note 4/6.    Nutrition Diagnosis: Increased nutrient needs (energy) r/t increased demand for nutrient AEB COVID+/infection/inflammation   ON GOING AT THIS TIME  Goal: Pt to consistently meet at least 75% estimated nutrient needs     Recommendations:  1. Current order for DASH Consistent Carbohydrate with evening snack Renal diet. Suggest to liberalize given poor PO; suggest use of no concentrated phos/k + mech soft to provide ease during meals. Recommend use of oral nutrition supplements; nepro x2 per day (425kcal/20gm prot per 1 can).  2. Monitor %PO intake, Diet tolerance, s/s of issues chewing/swallowing.   3. Monitor Skin, Labs, Wts, GI distress (Bowel Regime PRN should pt be confirmed with constipation), GOC.  4. Consider Nephro-Su daily.  5. RD to remain available for additional nutrition interventions as needed.     Education: n/a   Risk Level: High [  ] Moderate [ X ] Low [   ].

## 2020-04-11 DIAGNOSIS — N18.6 END STAGE RENAL DISEASE: ICD-10-CM

## 2020-04-11 LAB
ALBUMIN SERPL ELPH-MCNC: 3.5 G/DL — SIGNIFICANT CHANGE UP (ref 3.3–5)
ALP SERPL-CCNC: 85 U/L — SIGNIFICANT CHANGE UP (ref 40–120)
ALT FLD-CCNC: 34 U/L — SIGNIFICANT CHANGE UP (ref 10–45)
ANION GAP SERPL CALC-SCNC: 22 MMOL/L — HIGH (ref 5–17)
AST SERPL-CCNC: 32 U/L — SIGNIFICANT CHANGE UP (ref 10–40)
BASOPHILS # BLD AUTO: 0.01 K/UL — SIGNIFICANT CHANGE UP (ref 0–0.2)
BASOPHILS NFR BLD AUTO: 0.1 % — SIGNIFICANT CHANGE UP (ref 0–2)
BILIRUB SERPL-MCNC: 0.4 MG/DL — SIGNIFICANT CHANGE UP (ref 0.2–1.2)
BUN SERPL-MCNC: 64 MG/DL — HIGH (ref 7–23)
CALCIUM SERPL-MCNC: 9.3 MG/DL — SIGNIFICANT CHANGE UP (ref 8.4–10.5)
CHLORIDE SERPL-SCNC: 89 MMOL/L — LOW (ref 96–108)
CO2 SERPL-SCNC: 24 MMOL/L — SIGNIFICANT CHANGE UP (ref 22–31)
CREAT SERPL-MCNC: 5.95 MG/DL — HIGH (ref 0.5–1.3)
CRP SERPL-MCNC: 7.69 MG/DL — HIGH (ref 0–0.4)
D DIMER BLD IA.RAPID-MCNC: 156 NG/ML DDU — SIGNIFICANT CHANGE UP
EOSINOPHIL # BLD AUTO: 0.12 K/UL — SIGNIFICANT CHANGE UP (ref 0–0.5)
EOSINOPHIL NFR BLD AUTO: 1.3 % — SIGNIFICANT CHANGE UP (ref 0–6)
FERRITIN SERPL-MCNC: 1856 NG/ML — HIGH (ref 30–400)
GLUCOSE BLDC GLUCOMTR-MCNC: 124 MG/DL — HIGH (ref 70–99)
GLUCOSE BLDC GLUCOMTR-MCNC: 125 MG/DL — HIGH (ref 70–99)
GLUCOSE BLDC GLUCOMTR-MCNC: 136 MG/DL — HIGH (ref 70–99)
GLUCOSE BLDC GLUCOMTR-MCNC: 178 MG/DL — HIGH (ref 70–99)
GLUCOSE SERPL-MCNC: 118 MG/DL — HIGH (ref 70–99)
HCT VFR BLD CALC: 29.4 % — LOW (ref 39–50)
HGB BLD-MCNC: 9.7 G/DL — LOW (ref 13–17)
IMM GRANULOCYTES NFR BLD AUTO: 7.8 % — HIGH (ref 0–1.5)
LYMPHOCYTES # BLD AUTO: 0.43 K/UL — LOW (ref 1–3.3)
LYMPHOCYTES # BLD AUTO: 4.6 % — LOW (ref 13–44)
MAGNESIUM SERPL-MCNC: 2.5 MG/DL — SIGNIFICANT CHANGE UP (ref 1.6–2.6)
MCHC RBC-ENTMCNC: 33 GM/DL — SIGNIFICANT CHANGE UP (ref 32–36)
MCHC RBC-ENTMCNC: 33.1 PG — SIGNIFICANT CHANGE UP (ref 27–34)
MCV RBC AUTO: 100.3 FL — HIGH (ref 80–100)
MONOCYTES # BLD AUTO: 0.57 K/UL — SIGNIFICANT CHANGE UP (ref 0–0.9)
MONOCYTES NFR BLD AUTO: 6 % — SIGNIFICANT CHANGE UP (ref 2–14)
NEUTROPHILS # BLD AUTO: 7.57 K/UL — HIGH (ref 1.8–7.4)
NEUTROPHILS NFR BLD AUTO: 80.2 % — HIGH (ref 43–77)
NRBC # BLD: 0 /100 WBCS — SIGNIFICANT CHANGE UP (ref 0–0)
PHOSPHATE SERPL-MCNC: 7.6 MG/DL — HIGH (ref 2.5–4.5)
PLATELET # BLD AUTO: 244 K/UL — SIGNIFICANT CHANGE UP (ref 150–400)
POTASSIUM SERPL-MCNC: 3.6 MMOL/L — SIGNIFICANT CHANGE UP (ref 3.5–5.3)
POTASSIUM SERPL-SCNC: 3.6 MMOL/L — SIGNIFICANT CHANGE UP (ref 3.5–5.3)
PROT SERPL-MCNC: 6.7 G/DL — SIGNIFICANT CHANGE UP (ref 6–8.3)
RBC # BLD: 2.93 M/UL — LOW (ref 4.2–5.8)
RBC # FLD: 13.5 % — SIGNIFICANT CHANGE UP (ref 10.3–14.5)
SODIUM SERPL-SCNC: 135 MMOL/L — SIGNIFICANT CHANGE UP (ref 135–145)
WBC # BLD: 9.44 K/UL — SIGNIFICANT CHANGE UP (ref 3.8–10.5)
WBC # FLD AUTO: 9.44 K/UL — SIGNIFICANT CHANGE UP (ref 3.8–10.5)

## 2020-04-11 PROCEDURE — 99233 SBSQ HOSP IP/OBS HIGH 50: CPT | Mod: CS

## 2020-04-11 RX ADMIN — SEVELAMER CARBONATE 1600 MILLIGRAM(S): 2400 POWDER, FOR SUSPENSION ORAL at 17:23

## 2020-04-11 RX ADMIN — SEVELAMER CARBONATE 1600 MILLIGRAM(S): 2400 POWDER, FOR SUSPENSION ORAL at 05:56

## 2020-04-11 RX ADMIN — SODIUM ZIRCONIUM CYCLOSILICATE 10 GRAM(S): 10 POWDER, FOR SUSPENSION ORAL at 12:44

## 2020-04-11 RX ADMIN — MONTELUKAST 10 MILLIGRAM(S): 4 TABLET, CHEWABLE ORAL at 18:27

## 2020-04-11 RX ADMIN — SEVELAMER CARBONATE 1600 MILLIGRAM(S): 2400 POWDER, FOR SUSPENSION ORAL at 12:41

## 2020-04-11 RX ADMIN — Medication 8 MILLIGRAM(S): at 22:36

## 2020-04-11 RX ADMIN — Medication 975 MILLIGRAM(S): at 22:36

## 2020-04-11 RX ADMIN — Medication 100 MILLIGRAM(S): at 12:41

## 2020-04-11 RX ADMIN — Medication 81 MILLIGRAM(S): at 22:36

## 2020-04-11 RX ADMIN — HEPARIN SODIUM 5000 UNIT(S): 5000 INJECTION INTRAVENOUS; SUBCUTANEOUS at 22:36

## 2020-04-11 RX ADMIN — ATORVASTATIN CALCIUM 20 MILLIGRAM(S): 80 TABLET, FILM COATED ORAL at 22:36

## 2020-04-11 RX ADMIN — Medication 2: at 17:25

## 2020-04-11 RX ADMIN — Medication 975 MILLIGRAM(S): at 15:23

## 2020-04-11 RX ADMIN — HEPARIN SODIUM 5000 UNIT(S): 5000 INJECTION INTRAVENOUS; SUBCUTANEOUS at 05:56

## 2020-04-11 RX ADMIN — HEPARIN SODIUM 5000 UNIT(S): 5000 INJECTION INTRAVENOUS; SUBCUTANEOUS at 15:24

## 2020-04-11 RX ADMIN — Medication 975 MILLIGRAM(S): at 05:56

## 2020-04-11 NOTE — PROGRESS NOTE ADULT - SUBJECTIVE AND OBJECTIVE BOX
CC: ESRD      INTERVAL HISTORY: data obtained from chart review secondary to COVID + status  exam deferred and information gleaned from primary team      ROS: No chest pain, no sob, no abd pain. No n/v/d    PAST MEDICAL & SURGICAL HISTORY:  Gout  HLD (hyperlipidemia)  HTN (hypertension)  HTN, age 0-18  CKD (chronic kidney disease)  DM (diabetes mellitus)      PHYSICAL EXAM:  T(C): 36.2 (04-11-20 @ 05:03), Max: 36.8 (04-10-20 @ 20:26)  HR: 78 (04-11-20 @ 04:36)  BP: 119/70 (04-11-20 @ 04:36) (111/61 - 138/69)  RR: 22 (04-11-20 @ 04:36)  SpO2: 95% (04-11-20 @ 04:36)  Wt(kg): --  I&O's Summary    10 Apr 2020 07:01  -  11 Apr 2020 07:00  --------------------------------------------------------  IN: 0 mL / OUT: 500 mL / NET: -500 mL      Weight   General:  NAD.  HEENT: moist mucous membranes, no pallor/cyanosis.  Neck: no JVD visible.  Cardiac: S1, S2. RRR. No murmurs   Respratory: CTA b/l, no access muscle use.   Abdomen: soft. nontender. nondistended  Skin: no rashes.  Extremities: no LE edema b/l  Access:       DATA:                        10.2<L>  10.97<H> )-----------( 268      ( 10 Apr 2020 06:51 )             31.4<L>    Ferritin, Serum: 1992 ng/mL <H> (04-10 @ 06:51)      139    |  94<L>  |  105<H>  ----------------------------<  111<H>  Ca:9.9   (10 Apr 2020 06:51)  4.4     |  22     |  8.16<H>        TPro  7.2    /  Alb  3.6    /  TBili  0.3    /  DBili  x      /  AST  33     /  ALT  45     /  AlkPhos  91     10 Apr 2020 06:51                    MEDICATIONS  (STANDING):  acetaminophen   Tablet .. 975 milliGRAM(s) Oral every 8 hours  allopurinol 100 milliGRAM(s) Oral daily  aspirin enteric coated 81 milliGRAM(s) Oral every 24 hours  atorvastatin 20 milliGRAM(s) Oral at bedtime  dextrose 5%. 1000 milliLiter(s) (50 mL/Hr) IV Continuous <Continuous>  dextrose 50% Injectable 12.5 Gram(s) IV Push once  dextrose 50% Injectable 25 Gram(s) IV Push once  dextrose 50% Injectable 25 Gram(s) IV Push once  doxazosin 8 milliGRAM(s) Oral at bedtime  famotidine    Tablet 20 milliGRAM(s) Oral every 48 hours  heparin  Injectable 5000 Unit(s) SubCutaneous every 8 hours  insulin lispro (HumaLOG) corrective regimen sliding scale   SubCutaneous Before meals and at bedtime  montelukast 10 milliGRAM(s) Oral every 24 hours  PPD  5 Tuberculin Unit(s) Injectable 5 Unit(s) IntraDermal once  sevelamer carbonate 1600 milliGRAM(s) Oral three times a day with meals  sodium zirconium cyclosilicate 10 Gram(s) Oral daily    MEDICATIONS  (PRN):  dextrose 40% Gel 15 Gram(s) Oral once PRN Blood Glucose LESS THAN 70 milliGRAM(s)/deciliter  glucagon  Injectable 1 milliGRAM(s) IntraMuscular once PRN Glucose LESS THAN 70 milligrams/deciliter

## 2020-04-11 NOTE — PHYSICAL THERAPY INITIAL EVALUATION ADULT - PERTINENT HX OF CURRENT PROBLEM, REHAB EVAL
84 years old M with PMH of IDDM, HTN, HLD, CKD stage 4, sleep apnea (has CPAP machine), gout, Paget's s/p Reclast in 6/2014. 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 and verrucous acanthoma, presented from his nephrologist office (Dr. Jaffe?) because of fatigue and poor appetite and malaise w/ apparent increased creatinine in past week w/ productive cough.  COVID+ and patient was started on triple therapy.

## 2020-04-11 NOTE — PROGRESS NOTE ADULT - SUBJECTIVE AND OBJECTIVE BOX
INTERVAL HPI/OVERNIGHT EVENTS: No acute events overnight.     SUBJECTIVE: Patient seen and examined at bedside. Patient denies nausea, vomiting, fevers, chills, chest pain, shortness of breath, and abdominal pain.    OBJECTIVE:    VITAL SIGNS:  ICU Vital Signs Last 24 Hrs  T(C): 36.4 (11 Apr 2020 08:48), Max: 36.8 (10 Apr 2020 20:26)  T(F): 97.6 (11 Apr 2020 08:48), Max: 98.3 (10 Apr 2020 20:26)  HR: 70 (11 Apr 2020 08:48) (70 - 88)  BP: 135/66 (11 Apr 2020 08:48) (111/61 - 138/69)  BP(mean): 95 (11 Apr 2020 08:48) (95 - 95)  ABP: --  ABP(mean): --  RR: 22 (11 Apr 2020 08:48) (20 - 24)  SpO2: 95% (11 Apr 2020 08:48) (86% - 97%)        04-10 @ 07:01  -  04-11 @ 07:00  --------------------------------------------------------  IN: 0 mL / OUT: 500 mL / NET: -500 mL      CAPILLARY BLOOD GLUCOSE      POCT Blood Glucose.: 125 mg/dL (11 Apr 2020 06:20)      PHYSICAL EXAM:    General: NAD  HEENT: NC/AT; clear conjunctiva  Neck: supple  Respiratory: bibasilar crackles  Cardiovascular: +S1/S2; RRR, no murmurs, rubs, or gallops appreciated  Abdomen: soft, NT/ND; +BS x4  Extremities: no lower extremity edema noted  Neurological: alert and oriented to person and situation (time not assessed), no focal deficits appreciated    MEDICATIONS:  MEDICATIONS  (STANDING):  acetaminophen   Tablet .. 975 milliGRAM(s) Oral every 8 hours  allopurinol 100 milliGRAM(s) Oral daily  aspirin enteric coated 81 milliGRAM(s) Oral every 24 hours  atorvastatin 20 milliGRAM(s) Oral at bedtime  dextrose 5%. 1000 milliLiter(s) (50 mL/Hr) IV Continuous <Continuous>  dextrose 50% Injectable 12.5 Gram(s) IV Push once  dextrose 50% Injectable 25 Gram(s) IV Push once  dextrose 50% Injectable 25 Gram(s) IV Push once  doxazosin 8 milliGRAM(s) Oral at bedtime  famotidine    Tablet 20 milliGRAM(s) Oral every 48 hours  heparin  Injectable 5000 Unit(s) SubCutaneous every 8 hours  insulin lispro (HumaLOG) corrective regimen sliding scale   SubCutaneous Before meals and at bedtime  montelukast 10 milliGRAM(s) Oral every 24 hours  PPD  5 Tuberculin Unit(s) Injectable 5 Unit(s) IntraDermal once  sevelamer carbonate 1600 milliGRAM(s) Oral three times a day with meals  sodium zirconium cyclosilicate 10 Gram(s) Oral daily    MEDICATIONS  (PRN):  dextrose 40% Gel 15 Gram(s) Oral once PRN Blood Glucose LESS THAN 70 milliGRAM(s)/deciliter  glucagon  Injectable 1 milliGRAM(s) IntraMuscular once PRN Glucose LESS THAN 70 milligrams/deciliter      ALLERGIES:  Allergies    penicillin (Unknown)  sulfa drugs (Unknown)    Intolerances        LABS:                        10.2   10.97 )-----------( 268      ( 10 Apr 2020 06:51 )             31.4     04-10    139  |  94<L>  |  105<H>  ----------------------------<  111<H>  4.4   |  22  |  8.16<H>    Ca    9.9      10 Apr 2020 06:51  Phos  12.0     04-10  Mg     3.0     04-10    TPro  7.2  /  Alb  3.6  /  TBili  0.3  /  DBili  x   /  AST  33  /  ALT  45  /  AlkPhos  91  04-10          RADIOLOGY & ADDITIONAL TESTS: Reviewed.

## 2020-04-11 NOTE — PHYSICAL THERAPY INITIAL EVALUATION ADULT - BED MOBILITY LIMITATIONS, REHAB EVAL
Pt with SpO2 dec to 87% upon initial sit, able to inc back up to 92% with seated rest and VCs for deep breathing; Pt unable to tolerate sitting EOB >30s at a time before becoming too fatigued and laying back in supine

## 2020-04-11 NOTE — PROGRESS NOTE ADULT - PROBLEM SELECTOR PLAN 1
COVID 19 + (4/1)  - azithromycin 250 for 5 days total, complete 4/6  - plaquenil for 5 days (400 bid for the first day and 200 bid for the rest of the duration), complete 4/6  - Daily COVID labs: d-dimer, CRP and ferritin

## 2020-04-11 NOTE — PHYSICAL THERAPY INITIAL EVALUATION ADULT - IMPAIRMENTS FOUND, PT EVAL
muscle strength/gait, locomotion, and balance/ventilation and respiration/gas exchange/aerobic capacity/endurance/posture

## 2020-04-11 NOTE — PROGRESS NOTE ADULT - PROBLEM SELECTOR PLAN 3
Pt with hx of CKD now presented with , Cr 8.16, (unknown baseline).   - renal is on board--> HD initiated 4/2/20 via R IJ temp catheter   - last dialysis 4/10  - PPD read as negative 4/5  - Daily CMP  - HD per nephrology  - Started sevelamer 1600 TID for elevated phos  - c/w lokelma  - c/w Retacrit qWeek if hgB <10

## 2020-04-11 NOTE — PHYSICAL THERAPY INITIAL EVALUATION ADULT - GENERAL OBSERVATIONS, REHAB EVAL
Pt received semi supine, +Venturi Mask (50%,) +telemetry, +pulse ox, +heplock, NAD, agreeable to PT.

## 2020-04-11 NOTE — PHYSICAL THERAPY INITIAL EVALUATION ADULT - ADDITIONAL COMMENTS
Pt lives with his partner in an elevator access apt with "a few" KEMAL. At baseline, ambulated independently with no DME.

## 2020-04-11 NOTE — PROGRESS NOTE ADULT - ASSESSMENT
84 year old M with PMH of IDDM, HTN, HLD,ESRD on hemodialysis,  sleep apnea (has CPAP machine), gout, Paget's s/p Reclast in 6/2014. 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 and verrucous acanthoma  COVID + - presented with cough, general malaise, and fatigue

## 2020-04-11 NOTE — PHYSICAL THERAPY INITIAL EVALUATION ADULT - CRITERIA FOR SKILLED THERAPEUTIC INTERVENTIONS
rehab potential/impairments found/functional limitations in following categories/anticipated discharge recommendation

## 2020-04-12 LAB
ALBUMIN SERPL ELPH-MCNC: 2.9 G/DL — LOW (ref 3.3–5)
ALP SERPL-CCNC: 89 U/L — SIGNIFICANT CHANGE UP (ref 40–120)
ALT FLD-CCNC: 31 U/L — SIGNIFICANT CHANGE UP (ref 10–45)
ANION GAP SERPL CALC-SCNC: 18 MMOL/L — HIGH (ref 5–17)
AST SERPL-CCNC: 29 U/L — SIGNIFICANT CHANGE UP (ref 10–40)
BASOPHILS # BLD AUTO: 0.02 K/UL — SIGNIFICANT CHANGE UP (ref 0–0.2)
BASOPHILS NFR BLD AUTO: 0.2 % — SIGNIFICANT CHANGE UP (ref 0–2)
BILIRUB SERPL-MCNC: 0.3 MG/DL — SIGNIFICANT CHANGE UP (ref 0.2–1.2)
BUN SERPL-MCNC: 84 MG/DL — HIGH (ref 7–23)
CALCIUM SERPL-MCNC: 9.6 MG/DL — SIGNIFICANT CHANGE UP (ref 8.4–10.5)
CHLORIDE SERPL-SCNC: 91 MMOL/L — LOW (ref 96–108)
CO2 SERPL-SCNC: 25 MMOL/L — SIGNIFICANT CHANGE UP (ref 22–31)
CREAT SERPL-MCNC: 7.32 MG/DL — HIGH (ref 0.5–1.3)
CRP SERPL-MCNC: 6.45 MG/DL — HIGH (ref 0–0.4)
D DIMER BLD IA.RAPID-MCNC: <150 NG/ML DDU — SIGNIFICANT CHANGE UP
EOSINOPHIL # BLD AUTO: 0.13 K/UL — SIGNIFICANT CHANGE UP (ref 0–0.5)
EOSINOPHIL NFR BLD AUTO: 1.5 % — SIGNIFICANT CHANGE UP (ref 0–6)
FERRITIN SERPL-MCNC: 1891 NG/ML — HIGH (ref 30–400)
GLUCOSE BLDC GLUCOMTR-MCNC: 106 MG/DL — HIGH (ref 70–99)
GLUCOSE BLDC GLUCOMTR-MCNC: 134 MG/DL — HIGH (ref 70–99)
GLUCOSE BLDC GLUCOMTR-MCNC: 135 MG/DL — HIGH (ref 70–99)
GLUCOSE BLDC GLUCOMTR-MCNC: 137 MG/DL — HIGH (ref 70–99)
GLUCOSE SERPL-MCNC: 127 MG/DL — HIGH (ref 70–99)
HCT VFR BLD CALC: 31.4 % — LOW (ref 39–50)
HGB BLD-MCNC: 10.1 G/DL — LOW (ref 13–17)
IMM GRANULOCYTES NFR BLD AUTO: 8 % — HIGH (ref 0–1.5)
LYMPHOCYTES # BLD AUTO: 0.6 K/UL — LOW (ref 1–3.3)
LYMPHOCYTES # BLD AUTO: 6.7 % — LOW (ref 13–44)
MAGNESIUM SERPL-MCNC: 2.7 MG/DL — HIGH (ref 1.6–2.6)
MCHC RBC-ENTMCNC: 32.2 GM/DL — SIGNIFICANT CHANGE UP (ref 32–36)
MCHC RBC-ENTMCNC: 32.3 PG — SIGNIFICANT CHANGE UP (ref 27–34)
MCV RBC AUTO: 100.3 FL — HIGH (ref 80–100)
MONOCYTES # BLD AUTO: 0.6 K/UL — SIGNIFICANT CHANGE UP (ref 0–0.9)
MONOCYTES NFR BLD AUTO: 6.7 % — SIGNIFICANT CHANGE UP (ref 2–14)
NEUTROPHILS # BLD AUTO: 6.88 K/UL — SIGNIFICANT CHANGE UP (ref 1.8–7.4)
NEUTROPHILS NFR BLD AUTO: 76.9 % — SIGNIFICANT CHANGE UP (ref 43–77)
NRBC # BLD: 0 /100 WBCS — SIGNIFICANT CHANGE UP (ref 0–0)
PHOSPHATE SERPL-MCNC: 10.1 MG/DL — HIGH (ref 2.5–4.5)
PLATELET # BLD AUTO: 233 K/UL — SIGNIFICANT CHANGE UP (ref 150–400)
POTASSIUM SERPL-MCNC: 3.5 MMOL/L — SIGNIFICANT CHANGE UP (ref 3.5–5.3)
POTASSIUM SERPL-SCNC: 3.5 MMOL/L — SIGNIFICANT CHANGE UP (ref 3.5–5.3)
PROT SERPL-MCNC: 6.6 G/DL — SIGNIFICANT CHANGE UP (ref 6–8.3)
RBC # BLD: 3.13 M/UL — LOW (ref 4.2–5.8)
RBC # FLD: 13.5 % — SIGNIFICANT CHANGE UP (ref 10.3–14.5)
SODIUM SERPL-SCNC: 134 MMOL/L — LOW (ref 135–145)
WBC # BLD: 8.95 K/UL — SIGNIFICANT CHANGE UP (ref 3.8–10.5)
WBC # FLD AUTO: 8.95 K/UL — SIGNIFICANT CHANGE UP (ref 3.8–10.5)

## 2020-04-12 PROCEDURE — 99233 SBSQ HOSP IP/OBS HIGH 50: CPT | Mod: CS

## 2020-04-12 RX ORDER — POLYETHYLENE GLYCOL 3350 17 G/17G
17 POWDER, FOR SOLUTION ORAL
Refills: 0 | Status: DISCONTINUED | OUTPATIENT
Start: 2020-04-12 | End: 2020-04-22

## 2020-04-12 RX ADMIN — Medication 975 MILLIGRAM(S): at 21:40

## 2020-04-12 RX ADMIN — Medication 8 MILLIGRAM(S): at 21:40

## 2020-04-12 RX ADMIN — MONTELUKAST 10 MILLIGRAM(S): 4 TABLET, CHEWABLE ORAL at 17:08

## 2020-04-12 RX ADMIN — Medication 81 MILLIGRAM(S): at 21:37

## 2020-04-12 RX ADMIN — SEVELAMER CARBONATE 1600 MILLIGRAM(S): 2400 POWDER, FOR SUSPENSION ORAL at 08:29

## 2020-04-12 RX ADMIN — FAMOTIDINE 20 MILLIGRAM(S): 10 INJECTION INTRAVENOUS at 05:32

## 2020-04-12 RX ADMIN — SODIUM ZIRCONIUM CYCLOSILICATE 10 GRAM(S): 10 POWDER, FOR SUSPENSION ORAL at 12:56

## 2020-04-12 RX ADMIN — SEVELAMER CARBONATE 1600 MILLIGRAM(S): 2400 POWDER, FOR SUSPENSION ORAL at 17:08

## 2020-04-12 RX ADMIN — ATORVASTATIN CALCIUM 20 MILLIGRAM(S): 80 TABLET, FILM COATED ORAL at 21:39

## 2020-04-12 RX ADMIN — HEPARIN SODIUM 5000 UNIT(S): 5000 INJECTION INTRAVENOUS; SUBCUTANEOUS at 12:58

## 2020-04-12 RX ADMIN — HEPARIN SODIUM 5000 UNIT(S): 5000 INJECTION INTRAVENOUS; SUBCUTANEOUS at 05:31

## 2020-04-12 RX ADMIN — POLYETHYLENE GLYCOL 3350 17 GRAM(S): 17 POWDER, FOR SOLUTION ORAL at 21:37

## 2020-04-12 RX ADMIN — HEPARIN SODIUM 5000 UNIT(S): 5000 INJECTION INTRAVENOUS; SUBCUTANEOUS at 21:36

## 2020-04-12 RX ADMIN — Medication 100 MILLIGRAM(S): at 12:57

## 2020-04-12 RX ADMIN — Medication 975 MILLIGRAM(S): at 05:32

## 2020-04-12 RX ADMIN — SEVELAMER CARBONATE 1600 MILLIGRAM(S): 2400 POWDER, FOR SUSPENSION ORAL at 12:57

## 2020-04-12 RX ADMIN — Medication 975 MILLIGRAM(S): at 12:56

## 2020-04-12 NOTE — PROGRESS NOTE ADULT - SUBJECTIVE AND OBJECTIVE BOX
INTERVAL HPI/OVERNIGHT EVENTS: Patient tolerating wean from venturi to NC 6L satting 97%, dialysis for tomorrow.    SUBJECTIVE: Patient seen and examined at bedside. Comfortable. reporting feeling mildly better. discussed getting hearing aids from home.    OBJECTIVE:    VITAL SIGNS:  ICU Vital Signs Last 24 Hrs  T(C): 36.3 (12 Apr 2020 09:41), Max: 36.6 (11 Apr 2020 17:40)  T(F): 97.4 (12 Apr 2020 09:41), Max: 97.8 (11 Apr 2020 17:40)  HR: 70 (12 Apr 2020 09:41) (70 - 82)  BP: 106/55 (12 Apr 2020 09:41) (106/55 - 118/63)  BP(mean): 84 (11 Apr 2020 12:56) (84 - 84)  ABP: --  ABP(mean): --  RR: 19 (12 Apr 2020 10:06) (19 - 20)  SpO2: 97% (12 Apr 2020 10:06) (90% - 97%)        CAPILLARY BLOOD GLUCOSE      POCT Blood Glucose.: 135 mg/dL (12 Apr 2020 11:24)      PHYSICAL EXAM:    General: NAD  HEENT: NC/AT; PERRL, clear conjunctiva  Neck: supple  Respiratory: non-labored breathing   Cardiovascular: +S1/S2; RRR  Abdomen: soft, NT/ND; +BS x4  Extremities: WWP, 2+ peripheral pulses b/l; no LE edema  Skin: normal color and turgor; no rash  Neurological:     MEDICATIONS:  MEDICATIONS  (STANDING):  acetaminophen   Tablet .. 975 milliGRAM(s) Oral every 8 hours  allopurinol 100 milliGRAM(s) Oral daily  aspirin enteric coated 81 milliGRAM(s) Oral every 24 hours  atorvastatin 20 milliGRAM(s) Oral at bedtime  dextrose 5%. 1000 milliLiter(s) (50 mL/Hr) IV Continuous <Continuous>  dextrose 50% Injectable 12.5 Gram(s) IV Push once  dextrose 50% Injectable 25 Gram(s) IV Push once  dextrose 50% Injectable 25 Gram(s) IV Push once  doxazosin 8 milliGRAM(s) Oral at bedtime  famotidine    Tablet 20 milliGRAM(s) Oral every 48 hours  heparin  Injectable 5000 Unit(s) SubCutaneous every 8 hours  insulin lispro (HumaLOG) corrective regimen sliding scale   SubCutaneous Before meals and at bedtime  montelukast 10 milliGRAM(s) Oral every 24 hours  PPD  5 Tuberculin Unit(s) Injectable 5 Unit(s) IntraDermal once  sevelamer carbonate 1600 milliGRAM(s) Oral three times a day with meals  sodium zirconium cyclosilicate 10 Gram(s) Oral daily    MEDICATIONS  (PRN):  dextrose 40% Gel 15 Gram(s) Oral once PRN Blood Glucose LESS THAN 70 milliGRAM(s)/deciliter  glucagon  Injectable 1 milliGRAM(s) IntraMuscular once PRN Glucose LESS THAN 70 milligrams/deciliter      ALLERGIES:  Allergies    penicillin (Unknown)  sulfa drugs (Unknown)    Intolerances        LABS:                        10.1   8.95  )-----------( 233      ( 12 Apr 2020 10:58 )             31.4     04-12    134<L>  |  91<L>  |  84<H>  ----------------------------<  127<H>  3.5   |  25  |  7.32<H>    Ca    9.6      12 Apr 2020 10:58  Phos  10.1     04-12  Mg     2.7     04-12    TPro  6.6  /  Alb  2.9<L>  /  TBili  0.3  /  DBili  x   /  AST  29  /  ALT  31  /  AlkPhos  89  04-12          Daily COVID labs  Procalcitonin:   D-dimer: D-Dimer Assay, Quantitative: <150 ng/mL DDU (04-12-20 @ 10:58)  D-Dimer Assay, Quantitative: 156 ng/mL DDU (04-11-20 @ 09:35)  D-Dimer Assay, Quantitative: 164 ng/mL DDU (04-10-20 @ 06:51)    ESR:   CRP: C-Reactive Protein, Serum: 6.45 mg/dL (04-12-20 @ 10:58)  C-Reactive Protein, Serum: 7.69 mg/dL (04-11-20 @ 09:34)  C-Reactive Protein, Serum: 11.45 mg/dL (04-10-20 @ 06:51)    LDH:   Ferritin: Ferritin, Serum: 1891 ng/mL (04-12-20 @ 10:58)  Ferritin, Serum: 1856 ng/mL (04-11-20 @ 09:34)  Ferritin, Serum: 1992 ng/mL (04-10-20 @ 06:51)    Lactate: lc  Trop I:   Ck:     Admission COVID Labs  Full T cell subset:   G6PD:   Immunoglobulins panel:   Quantiferon Gold Tb:   Triglyceride level:           RADIOLOGY & ADDITIONAL TESTS: Reviewed.

## 2020-04-12 NOTE — PROVIDER CONTACT NOTE (OTHER) - SITUATION
pt has KACI and not on CPAPP or BiPAP r/t COVID precautions but seems to be very drowsy in communication- pt needs BIpap/CPAP with negative pressure room- concern for CO2 retention

## 2020-04-12 NOTE — PROGRESS NOTE ADULT - PROBLEM SELECTOR PLAN 1
next HD tentatively scheduled for 4/13 Monday  will re-assess in AM  [facility] daily - to be re-evaluated

## 2020-04-12 NOTE — PROGRESS NOTE ADULT - SUBJECTIVE AND OBJECTIVE BOX
CC: ESRD      INTERVAL HISTORY: information obtained from HS notes and lab review secondary to COVID + status      ROS: No chest pain, no sob, no abd pain. No n/v/d    PAST MEDICAL & SURGICAL HISTORY:  Gout  HLD (hyperlipidemia)  HTN (hypertension)  HTN, age 0-18  CKD (chronic kidney disease)  DM (diabetes mellitus)      PHYSICAL EXAM:  T(C): 35.8 (04-12-20 @ 06:20), Max: 36.6 (04-11-20 @ 17:40)  HR: 72 (04-12-20 @ 04:36)  BP: 117/65 (04-12-20 @ 04:36) (112/60 - 135/66)  RR: 20 (04-12-20 @ 04:36)  SpO2: 95% (04-12-20 @ 04:36)  Wt(kg): --  I&O's Summary    Weight   General: AAO x 3,  NAD.  HEENT: moist mucous membranes, no pallor/cyanosis.  Neck: no JVD visible.  Cardiac: S1, S2. RRR. No murmurs   Respratory: CTA b/l, no access muscle use.   Abdomen: soft. nontender. nondistended  Skin: no rashes.  Extremities: no LE edema b/l  Access:       DATA:                        9.7<L>  9.44  )-----------( 244      ( 11 Apr 2020 09:34 )             29.4<L>    Ferritin, Serum: 1856 ng/mL <H> (04-11 @ 09:34)      135    |  89<L>  |  64<H>  ----------------------------<  118<H>  Ca:9.3   (11 Apr 2020 09:34)  3.6     |  24     |  5.95<H>      eGFR if Non : 8 <L>  eGFR if : 9 <L>    TPro  6.7    /  Alb  3.5    /  TBili  0.4    /  DBili  x      /  AST  32     /  ALT  34     /  AlkPhos  85     11 Apr 2020 09:34                    MEDICATIONS  (STANDING):  acetaminophen   Tablet .. 975 milliGRAM(s) Oral every 8 hours  allopurinol 100 milliGRAM(s) Oral daily  aspirin enteric coated 81 milliGRAM(s) Oral every 24 hours  atorvastatin 20 milliGRAM(s) Oral at bedtime  dextrose 5%. 1000 milliLiter(s) (50 mL/Hr) IV Continuous <Continuous>  dextrose 50% Injectable 12.5 Gram(s) IV Push once  dextrose 50% Injectable 25 Gram(s) IV Push once  dextrose 50% Injectable 25 Gram(s) IV Push once  doxazosin 8 milliGRAM(s) Oral at bedtime  famotidine    Tablet 20 milliGRAM(s) Oral every 48 hours  heparin  Injectable 5000 Unit(s) SubCutaneous every 8 hours  insulin lispro (HumaLOG) corrective regimen sliding scale   SubCutaneous Before meals and at bedtime  montelukast 10 milliGRAM(s) Oral every 24 hours  PPD  5 Tuberculin Unit(s) Injectable 5 Unit(s) IntraDermal once  sevelamer carbonate 1600 milliGRAM(s) Oral three times a day with meals  sodium zirconium cyclosilicate 10 Gram(s) Oral daily    MEDICATIONS  (PRN):  dextrose 40% Gel 15 Gram(s) Oral once PRN Blood Glucose LESS THAN 70 milliGRAM(s)/deciliter  glucagon  Injectable 1 milliGRAM(s) IntraMuscular once PRN Glucose LESS THAN 70 milligrams/deciliter

## 2020-04-12 NOTE — PROVIDER CONTACT NOTE (OTHER) - REASON
pt has KACI and not on CPAPP or BiPAP r/t COVID precautions but seems to be very drowsy in communication

## 2020-04-13 LAB
ALBUMIN SERPL ELPH-MCNC: 2.9 G/DL — LOW (ref 3.3–5)
ALP SERPL-CCNC: 90 U/L — SIGNIFICANT CHANGE UP (ref 40–120)
ALT FLD-CCNC: 32 U/L — SIGNIFICANT CHANGE UP (ref 10–45)
ANION GAP SERPL CALC-SCNC: 18 MMOL/L — HIGH (ref 5–17)
AST SERPL-CCNC: 30 U/L — SIGNIFICANT CHANGE UP (ref 10–40)
BASOPHILS # BLD AUTO: 0.03 K/UL — SIGNIFICANT CHANGE UP (ref 0–0.2)
BASOPHILS NFR BLD AUTO: 0.3 % — SIGNIFICANT CHANGE UP (ref 0–2)
BILIRUB SERPL-MCNC: 0.3 MG/DL — SIGNIFICANT CHANGE UP (ref 0.2–1.2)
BUN SERPL-MCNC: 97 MG/DL — HIGH (ref 7–23)
CALCIUM SERPL-MCNC: 9.3 MG/DL — SIGNIFICANT CHANGE UP (ref 8.4–10.5)
CHLORIDE SERPL-SCNC: 93 MMOL/L — LOW (ref 96–108)
CO2 SERPL-SCNC: 23 MMOL/L — SIGNIFICANT CHANGE UP (ref 22–31)
CREAT SERPL-MCNC: 7.85 MG/DL — HIGH (ref 0.5–1.3)
CRP SERPL-MCNC: 4.87 MG/DL — HIGH (ref 0–0.4)
D DIMER BLD IA.RAPID-MCNC: <150 NG/ML DDU — SIGNIFICANT CHANGE UP
EOSINOPHIL # BLD AUTO: 0.11 K/UL — SIGNIFICANT CHANGE UP (ref 0–0.5)
EOSINOPHIL NFR BLD AUTO: 1.2 % — SIGNIFICANT CHANGE UP (ref 0–6)
FERRITIN SERPL-MCNC: 1990 NG/ML — HIGH (ref 30–400)
GLUCOSE BLDC GLUCOMTR-MCNC: 100 MG/DL — HIGH (ref 70–99)
GLUCOSE BLDC GLUCOMTR-MCNC: 103 MG/DL — HIGH (ref 70–99)
GLUCOSE BLDC GLUCOMTR-MCNC: 118 MG/DL — HIGH (ref 70–99)
GLUCOSE BLDC GLUCOMTR-MCNC: 130 MG/DL — HIGH (ref 70–99)
GLUCOSE SERPL-MCNC: 114 MG/DL — HIGH (ref 70–99)
HCT VFR BLD CALC: 28.4 % — LOW (ref 39–50)
HGB BLD-MCNC: 9.3 G/DL — LOW (ref 13–17)
IMM GRANULOCYTES NFR BLD AUTO: 5.8 % — HIGH (ref 0–1.5)
LYMPHOCYTES # BLD AUTO: 0.51 K/UL — LOW (ref 1–3.3)
LYMPHOCYTES # BLD AUTO: 5.4 % — LOW (ref 13–44)
MAGNESIUM SERPL-MCNC: 2.7 MG/DL — HIGH (ref 1.6–2.6)
MCHC RBC-ENTMCNC: 32.3 PG — SIGNIFICANT CHANGE UP (ref 27–34)
MCHC RBC-ENTMCNC: 32.7 GM/DL — SIGNIFICANT CHANGE UP (ref 32–36)
MCV RBC AUTO: 98.6 FL — SIGNIFICANT CHANGE UP (ref 80–100)
MONOCYTES # BLD AUTO: 0.67 K/UL — SIGNIFICANT CHANGE UP (ref 0–0.9)
MONOCYTES NFR BLD AUTO: 7.1 % — SIGNIFICANT CHANGE UP (ref 2–14)
NEUTROPHILS # BLD AUTO: 7.6 K/UL — HIGH (ref 1.8–7.4)
NEUTROPHILS NFR BLD AUTO: 80.2 % — HIGH (ref 43–77)
NRBC # BLD: 0 /100 WBCS — SIGNIFICANT CHANGE UP (ref 0–0)
PHOSPHATE SERPL-MCNC: 11.1 MG/DL — HIGH (ref 2.5–4.5)
PLATELET # BLD AUTO: 239 K/UL — SIGNIFICANT CHANGE UP (ref 150–400)
POTASSIUM SERPL-MCNC: 3.9 MMOL/L — SIGNIFICANT CHANGE UP (ref 3.5–5.3)
POTASSIUM SERPL-SCNC: 3.9 MMOL/L — SIGNIFICANT CHANGE UP (ref 3.5–5.3)
PROT SERPL-MCNC: 6.3 G/DL — SIGNIFICANT CHANGE UP (ref 6–8.3)
RBC # BLD: 2.88 M/UL — LOW (ref 4.2–5.8)
RBC # FLD: 13.4 % — SIGNIFICANT CHANGE UP (ref 10.3–14.5)
SODIUM SERPL-SCNC: 134 MMOL/L — LOW (ref 135–145)
WBC # BLD: 9.47 K/UL — SIGNIFICANT CHANGE UP (ref 3.8–10.5)
WBC # FLD AUTO: 9.47 K/UL — SIGNIFICANT CHANGE UP (ref 3.8–10.5)

## 2020-04-13 PROCEDURE — 71045 X-RAY EXAM CHEST 1 VIEW: CPT | Mod: 26

## 2020-04-13 RX ORDER — SIMETHICONE 80 MG/1
80 TABLET, CHEWABLE ORAL DAILY
Refills: 0 | Status: DISCONTINUED | OUTPATIENT
Start: 2020-04-13 | End: 2020-04-14

## 2020-04-13 RX ADMIN — SEVELAMER CARBONATE 1600 MILLIGRAM(S): 2400 POWDER, FOR SUSPENSION ORAL at 12:37

## 2020-04-13 RX ADMIN — MONTELUKAST 10 MILLIGRAM(S): 4 TABLET, CHEWABLE ORAL at 16:21

## 2020-04-13 RX ADMIN — HEPARIN SODIUM 5000 UNIT(S): 5000 INJECTION INTRAVENOUS; SUBCUTANEOUS at 05:34

## 2020-04-13 RX ADMIN — Medication 975 MILLIGRAM(S): at 05:34

## 2020-04-13 RX ADMIN — SEVELAMER CARBONATE 1600 MILLIGRAM(S): 2400 POWDER, FOR SUSPENSION ORAL at 07:56

## 2020-04-13 RX ADMIN — Medication 975 MILLIGRAM(S): at 21:34

## 2020-04-13 RX ADMIN — HEPARIN SODIUM 5000 UNIT(S): 5000 INJECTION INTRAVENOUS; SUBCUTANEOUS at 21:33

## 2020-04-13 RX ADMIN — HEPARIN SODIUM 5000 UNIT(S): 5000 INJECTION INTRAVENOUS; SUBCUTANEOUS at 12:38

## 2020-04-13 RX ADMIN — Medication 100 MILLIGRAM(S): at 12:38

## 2020-04-13 RX ADMIN — Medication 8 MILLIGRAM(S): at 21:33

## 2020-04-13 RX ADMIN — POLYETHYLENE GLYCOL 3350 17 GRAM(S): 17 POWDER, FOR SOLUTION ORAL at 05:35

## 2020-04-13 RX ADMIN — POLYETHYLENE GLYCOL 3350 17 GRAM(S): 17 POWDER, FOR SOLUTION ORAL at 16:21

## 2020-04-13 RX ADMIN — SEVELAMER CARBONATE 1600 MILLIGRAM(S): 2400 POWDER, FOR SUSPENSION ORAL at 16:21

## 2020-04-13 RX ADMIN — Medication 975 MILLIGRAM(S): at 12:38

## 2020-04-13 RX ADMIN — Medication 81 MILLIGRAM(S): at 21:33

## 2020-04-13 RX ADMIN — ATORVASTATIN CALCIUM 20 MILLIGRAM(S): 80 TABLET, FILM COATED ORAL at 21:34

## 2020-04-13 NOTE — PROGRESS NOTE ADULT - ASSESSMENT
HPI: A 84 years old M with PMH of IDDM, HTN, HLD, CKD stage 4, sleep apnea (has CPAP machine), gout, Paget's s/p Reclast in 6/2014. 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 and verrucous acanthoma, presented from his nephrologist office (Dr. Jaffe?) because of fatigue and poor appetite and malaise w/ apparent increased creatinine in past week w/ productive cough.   COVID+ and undergoing dialysis.

## 2020-04-13 NOTE — PROGRESS NOTE ADULT - SUBJECTIVE AND OBJECTIVE BOX
INTERVAL HPI/OVERNIGHT EVENTS: Worsening chest xray, pending dialysis, phosphate high    SUBJECTIVE: Patient seen and examined at bedside. non labored and comfortable     OBJECTIVE:    VITAL SIGNS:  ICU Vital Signs Last 24 Hrs  T(C): 37 (13 Apr 2020 08:59), Max: 37 (13 Apr 2020 01:00)  T(F): 98.6 (13 Apr 2020 08:59), Max: 98.6 (13 Apr 2020 01:00)  HR: 78 (13 Apr 2020 08:59) (71 - 85)  BP: 114/61 (13 Apr 2020 08:59) (101/55 - 154/95)  BP(mean): --  ABP: --  ABP(mean): --  RR: 95 (13 Apr 2020 08:59) (19 - 95)  SpO2: 93% (13 Apr 2020 08:59) (87% - 95%)        04-12 @ 07:01  -  04-13 @ 07:00  --------------------------------------------------------  IN: 180 mL / OUT: 200 mL / NET: -20 mL      CAPILLARY BLOOD GLUCOSE      POCT Blood Glucose.: 118 mg/dL (13 Apr 2020 06:36)      PHYSICAL EXAM:    General: NAD  HEENT: NC/AT; PERRL, clear conjunctiva  Neck: supple  Respiratory: non-labored  Cardiovascular: +S1/S2; RRR  Abdomen: soft, NT/ND; +BS x4  Extremities: WWP, 2+ peripheral pulses b/l; no LE edema  Skin: normal color and turgor; no rash  Neurological: AAOx1-2    MEDICATIONS:  MEDICATIONS  (STANDING):  acetaminophen   Tablet .. 975 milliGRAM(s) Oral every 8 hours  allopurinol 100 milliGRAM(s) Oral daily  aspirin enteric coated 81 milliGRAM(s) Oral every 24 hours  atorvastatin 20 milliGRAM(s) Oral at bedtime  dextrose 5%. 1000 milliLiter(s) (50 mL/Hr) IV Continuous <Continuous>  dextrose 50% Injectable 12.5 Gram(s) IV Push once  dextrose 50% Injectable 25 Gram(s) IV Push once  dextrose 50% Injectable 25 Gram(s) IV Push once  doxazosin 8 milliGRAM(s) Oral at bedtime  famotidine    Tablet 20 milliGRAM(s) Oral every 48 hours  heparin  Injectable 5000 Unit(s) SubCutaneous every 8 hours  insulin lispro (HumaLOG) corrective regimen sliding scale   SubCutaneous Before meals and at bedtime  montelukast 10 milliGRAM(s) Oral every 24 hours  polyethylene glycol 3350 17 Gram(s) Oral two times a day  PPD  5 Tuberculin Unit(s) Injectable 5 Unit(s) IntraDermal once  sevelamer carbonate 1600 milliGRAM(s) Oral three times a day with meals    MEDICATIONS  (PRN):  dextrose 40% Gel 15 Gram(s) Oral once PRN Blood Glucose LESS THAN 70 milliGRAM(s)/deciliter  glucagon  Injectable 1 milliGRAM(s) IntraMuscular once PRN Glucose LESS THAN 70 milligrams/deciliter      ALLERGIES:  Allergies    penicillin (Unknown)  sulfa drugs (Unknown)    Intolerances        LABS:                        9.3    9.47  )-----------( 239      ( 13 Apr 2020 08:55 )             28.4     04-13    134<L>  |  93<L>  |  97<H>  ----------------------------<  114<H>  3.9   |  23  |  7.85<H>    Ca    9.3      13 Apr 2020 08:49  Phos  11.1     04-13  Mg     2.7     04-13    TPro  6.3  /  Alb  2.9<L>  /  TBili  0.3  /  DBili  x   /  AST  30  /  ALT  32  /  AlkPhos  90  04-13          Daily COVID labs  Procalcitonin:   D-dimer: D-Dimer Assay, Quantitative: <150 ng/mL DDU (04-13-20 @ 08:54)  D-Dimer Assay, Quantitative: <150 ng/mL DDU (04-12-20 @ 10:58)  D-Dimer Assay, Quantitative: 156 ng/mL DDU (04-11-20 @ 09:35)    ESR:   CRP: C-Reactive Protein, Serum: 4.87 mg/dL (04-13-20 @ 08:49)  C-Reactive Protein, Serum: 6.45 mg/dL (04-12-20 @ 10:58)  C-Reactive Protein, Serum: 7.69 mg/dL (04-11-20 @ 09:34)    LDH:   Ferritin: Ferritin, Serum: 1990 ng/mL (04-13-20 @ 08:51)  Ferritin, Serum: 1891 ng/mL (04-12-20 @ 10:58)  Ferritin, Serum: 1856 ng/mL (04-11-20 @ 09:34)    Lactate: lc  Trop I:   Ck:     Admission COVID Labs  Full T cell subset:   G6PD:   Immunoglobulins panel:   Quantiferon Gold Tb:   Triglyceride level:           RADIOLOGY & ADDITIONAL TESTS: Reviewed.

## 2020-04-13 NOTE — PROGRESS NOTE ADULT - SUBJECTIVE AND OBJECTIVE BOX
CC: ESRD      INTERVAL HISTORY:  labs and chart review secondary to COVID+ patients      ROS: No chest pain, no sob, no abd pain. No n/v/d    PAST MEDICAL & SURGICAL HISTORY:  Gout  HLD (hyperlipidemia)  HTN (hypertension)  HTN, age 0-18  CKD (chronic kidney disease)  DM (diabetes mellitus)      PHYSICAL EXAM:  T(C): 37 (04-13-20 @ 08:59), Max: 37 (04-13-20 @ 01:00)  HR: 78 (04-13-20 @ 08:59)  BP: 114/61 (04-13-20 @ 08:59) (101/55 - 154/95)  RR: 95 (04-13-20 @ 08:59)  SpO2: 93% (04-13-20 @ 08:59)  Wt(kg): --  I&O's Summary    12 Apr 2020 07:01  -  13 Apr 2020 07:00  --------------------------------------------------------  IN: 180 mL / OUT: 200 mL / NET: -20 mL      Weight   General: AAO x 3,  NAD.  HEENT: moist mucous membranes, no pallor/cyanosis.  Neck: no JVD visible.  Cardiac: S1, S2. RRR. No murmurs   Respratory: CTA b/l, no access muscle use.   Abdomen: soft. nontender. nondistended  Skin: no rashes.  Extremities: no LE edema b/l  Access:       DATA:                        9.3<L>  9.47  )-----------( 239      ( 13 Apr 2020 08:55 )             28.4<L>    Ferritin, Serum: 1990 ng/mL <H> (04-13 @ 08:51)      134<L>  |  93<L>  |  97<H>  ----------------------------<  114<H>  Ca:9.3   (13 Apr 2020 08:49)  3.9     |  23     |  7.85<H>      eGFR if Non : 6 <L>  eGFR if : 7 <L>    TPro  6.3    /  Alb  2.9<L>  /  TBili  0.3    /  DBili  x      /  AST  30     /  ALT  32     /  AlkPhos  90     13 Apr 2020 08:49                    MEDICATIONS  (STANDING):  acetaminophen   Tablet .. 975 milliGRAM(s) Oral every 8 hours  allopurinol 100 milliGRAM(s) Oral daily  aspirin enteric coated 81 milliGRAM(s) Oral every 24 hours  atorvastatin 20 milliGRAM(s) Oral at bedtime  dextrose 5%. 1000 milliLiter(s) (50 mL/Hr) IV Continuous <Continuous>  dextrose 50% Injectable 12.5 Gram(s) IV Push once  dextrose 50% Injectable 25 Gram(s) IV Push once  dextrose 50% Injectable 25 Gram(s) IV Push once  doxazosin 8 milliGRAM(s) Oral at bedtime  famotidine    Tablet 20 milliGRAM(s) Oral every 48 hours  heparin  Injectable 5000 Unit(s) SubCutaneous every 8 hours  insulin lispro (HumaLOG) corrective regimen sliding scale   SubCutaneous Before meals and at bedtime  montelukast 10 milliGRAM(s) Oral every 24 hours  polyethylene glycol 3350 17 Gram(s) Oral two times a day  PPD  5 Tuberculin Unit(s) Injectable 5 Unit(s) IntraDermal once  sevelamer carbonate 1600 milliGRAM(s) Oral three times a day with meals  sodium zirconium cyclosilicate 10 Gram(s) Oral daily    MEDICATIONS  (PRN):  dextrose 40% Gel 15 Gram(s) Oral once PRN Blood Glucose LESS THAN 70 milliGRAM(s)/deciliter  glucagon  Injectable 1 milliGRAM(s) IntraMuscular once PRN Glucose LESS THAN 70 milligrams/deciliter

## 2020-04-14 LAB
ALBUMIN SERPL ELPH-MCNC: 3.2 G/DL — LOW (ref 3.3–5)
ALP SERPL-CCNC: 88 U/L — SIGNIFICANT CHANGE UP (ref 40–120)
ALT FLD-CCNC: 31 U/L — SIGNIFICANT CHANGE UP (ref 10–45)
ANION GAP SERPL CALC-SCNC: 20 MMOL/L — HIGH (ref 5–17)
AST SERPL-CCNC: 31 U/L — SIGNIFICANT CHANGE UP (ref 10–40)
BASOPHILS # BLD AUTO: 0.03 K/UL — SIGNIFICANT CHANGE UP (ref 0–0.2)
BASOPHILS NFR BLD AUTO: 0.3 % — SIGNIFICANT CHANGE UP (ref 0–2)
BILIRUB SERPL-MCNC: 0.3 MG/DL — SIGNIFICANT CHANGE UP (ref 0.2–1.2)
BUN SERPL-MCNC: 110 MG/DL — HIGH (ref 7–23)
CALCIUM SERPL-MCNC: 9.4 MG/DL — SIGNIFICANT CHANGE UP (ref 8.4–10.5)
CHLORIDE SERPL-SCNC: 93 MMOL/L — LOW (ref 96–108)
CO2 SERPL-SCNC: 23 MMOL/L — SIGNIFICANT CHANGE UP (ref 22–31)
CREAT SERPL-MCNC: 8.3 MG/DL — HIGH (ref 0.5–1.3)
CRP SERPL-MCNC: 3.23 MG/DL — HIGH (ref 0–0.4)
D DIMER BLD IA.RAPID-MCNC: <150 NG/ML DDU — SIGNIFICANT CHANGE UP
EOSINOPHIL # BLD AUTO: 0.14 K/UL — SIGNIFICANT CHANGE UP (ref 0–0.5)
EOSINOPHIL NFR BLD AUTO: 1.5 % — SIGNIFICANT CHANGE UP (ref 0–6)
FERRITIN SERPL-MCNC: 1960 NG/ML — HIGH (ref 30–400)
GLUCOSE BLDC GLUCOMTR-MCNC: 109 MG/DL — HIGH (ref 70–99)
GLUCOSE BLDC GLUCOMTR-MCNC: 118 MG/DL — HIGH (ref 70–99)
GLUCOSE BLDC GLUCOMTR-MCNC: 207 MG/DL — HIGH (ref 70–99)
GLUCOSE BLDC GLUCOMTR-MCNC: 97 MG/DL — SIGNIFICANT CHANGE UP (ref 70–99)
GLUCOSE SERPL-MCNC: 87 MG/DL — SIGNIFICANT CHANGE UP (ref 70–99)
HCT VFR BLD CALC: 28.8 % — LOW (ref 39–50)
HGB BLD-MCNC: 9.3 G/DL — LOW (ref 13–17)
IMM GRANULOCYTES NFR BLD AUTO: 4.8 % — HIGH (ref 0–1.5)
LYMPHOCYTES # BLD AUTO: 0.6 K/UL — LOW (ref 1–3.3)
LYMPHOCYTES # BLD AUTO: 6.3 % — LOW (ref 13–44)
MAGNESIUM SERPL-MCNC: 2.9 MG/DL — HIGH (ref 1.6–2.6)
MCHC RBC-ENTMCNC: 32 PG — SIGNIFICANT CHANGE UP (ref 27–34)
MCHC RBC-ENTMCNC: 32.3 GM/DL — SIGNIFICANT CHANGE UP (ref 32–36)
MCV RBC AUTO: 99 FL — SIGNIFICANT CHANGE UP (ref 80–100)
MONOCYTES # BLD AUTO: 0.71 K/UL — SIGNIFICANT CHANGE UP (ref 0–0.9)
MONOCYTES NFR BLD AUTO: 7.4 % — SIGNIFICANT CHANGE UP (ref 2–14)
NEUTROPHILS # BLD AUTO: 7.61 K/UL — HIGH (ref 1.8–7.4)
NEUTROPHILS NFR BLD AUTO: 79.7 % — HIGH (ref 43–77)
NRBC # BLD: 0 /100 WBCS — SIGNIFICANT CHANGE UP (ref 0–0)
PHOSPHATE SERPL-MCNC: 11.7 MG/DL — HIGH (ref 2.5–4.5)
PLATELET # BLD AUTO: 245 K/UL — SIGNIFICANT CHANGE UP (ref 150–400)
POTASSIUM SERPL-MCNC: 4 MMOL/L — SIGNIFICANT CHANGE UP (ref 3.5–5.3)
POTASSIUM SERPL-SCNC: 4 MMOL/L — SIGNIFICANT CHANGE UP (ref 3.5–5.3)
PROT SERPL-MCNC: 6.3 G/DL — SIGNIFICANT CHANGE UP (ref 6–8.3)
RBC # BLD: 2.91 M/UL — LOW (ref 4.2–5.8)
RBC # FLD: 13.2 % — SIGNIFICANT CHANGE UP (ref 10.3–14.5)
SODIUM SERPL-SCNC: 136 MMOL/L — SIGNIFICANT CHANGE UP (ref 135–145)
WBC # BLD: 9.55 K/UL — SIGNIFICANT CHANGE UP (ref 3.8–10.5)
WBC # FLD AUTO: 9.55 K/UL — SIGNIFICANT CHANGE UP (ref 3.8–10.5)

## 2020-04-14 PROCEDURE — 99233 SBSQ HOSP IP/OBS HIGH 50: CPT

## 2020-04-14 RX ORDER — SIMETHICONE 80 MG/1
80 TABLET, CHEWABLE ORAL THREE TIMES A DAY
Refills: 0 | Status: DISCONTINUED | OUTPATIENT
Start: 2020-04-14 | End: 2020-04-22

## 2020-04-14 RX ORDER — ACETAMINOPHEN 500 MG
650 TABLET ORAL EVERY 6 HOURS
Refills: 0 | Status: DISCONTINUED | OUTPATIENT
Start: 2020-04-14 | End: 2020-04-22

## 2020-04-14 RX ORDER — SIMETHICONE 80 MG/1
80 TABLET, CHEWABLE ORAL ONCE
Refills: 0 | Status: DISCONTINUED | OUTPATIENT
Start: 2020-04-14 | End: 2020-04-14

## 2020-04-14 RX ADMIN — HEPARIN SODIUM 5000 UNIT(S): 5000 INJECTION INTRAVENOUS; SUBCUTANEOUS at 21:32

## 2020-04-14 RX ADMIN — FAMOTIDINE 20 MILLIGRAM(S): 10 INJECTION INTRAVENOUS at 05:48

## 2020-04-14 RX ADMIN — SEVELAMER CARBONATE 1600 MILLIGRAM(S): 2400 POWDER, FOR SUSPENSION ORAL at 12:45

## 2020-04-14 RX ADMIN — SIMETHICONE 80 MILLIGRAM(S): 80 TABLET, CHEWABLE ORAL at 06:00

## 2020-04-14 RX ADMIN — SEVELAMER CARBONATE 1600 MILLIGRAM(S): 2400 POWDER, FOR SUSPENSION ORAL at 08:34

## 2020-04-14 RX ADMIN — POLYETHYLENE GLYCOL 3350 17 GRAM(S): 17 POWDER, FOR SOLUTION ORAL at 17:58

## 2020-04-14 RX ADMIN — Medication 81 MILLIGRAM(S): at 21:32

## 2020-04-14 RX ADMIN — HEPARIN SODIUM 5000 UNIT(S): 5000 INJECTION INTRAVENOUS; SUBCUTANEOUS at 12:45

## 2020-04-14 RX ADMIN — SEVELAMER CARBONATE 1600 MILLIGRAM(S): 2400 POWDER, FOR SUSPENSION ORAL at 17:58

## 2020-04-14 RX ADMIN — POLYETHYLENE GLYCOL 3350 17 GRAM(S): 17 POWDER, FOR SOLUTION ORAL at 05:48

## 2020-04-14 RX ADMIN — MONTELUKAST 10 MILLIGRAM(S): 4 TABLET, CHEWABLE ORAL at 17:58

## 2020-04-14 RX ADMIN — ATORVASTATIN CALCIUM 20 MILLIGRAM(S): 80 TABLET, FILM COATED ORAL at 21:32

## 2020-04-14 RX ADMIN — SIMETHICONE 80 MILLIGRAM(S): 80 TABLET, CHEWABLE ORAL at 17:30

## 2020-04-14 RX ADMIN — Medication 100 MILLIGRAM(S): at 12:44

## 2020-04-14 RX ADMIN — Medication 0: at 13:21

## 2020-04-14 RX ADMIN — Medication 8 MILLIGRAM(S): at 21:32

## 2020-04-14 RX ADMIN — HEPARIN SODIUM 5000 UNIT(S): 5000 INJECTION INTRAVENOUS; SUBCUTANEOUS at 05:48

## 2020-04-14 NOTE — PROGRESS NOTE ADULT - SUBJECTIVE AND OBJECTIVE BOX
INTERVAL HPI/OVERNIGHT EVENTS: ROSENDO overnight, tolerating venturi. Patient says that he spoke to son melissa yesterday, going for dialysis today.    SUBJECTIVE: Patient seen and examined at bedside. No complaints at this time.     OBJECTIVE:    VITAL SIGNS:  ICU Vital Signs Last 24 Hrs  T(C): 36.4 (14 Apr 2020 05:50), Max: 36.7 (13 Apr 2020 12:42)  T(F): 97.5 (14 Apr 2020 05:50), Max: 98 (13 Apr 2020 12:42)  HR: 81 (14 Apr 2020 05:50) (72 - 81)  BP: 120/67 (14 Apr 2020 05:50) (111/63 - 127/60)  BP(mean): --  ABP: --  ABP(mean): --  RR: 20 (14 Apr 2020 05:50) (16 - 20)  SpO2: 93% (14 Apr 2020 05:50) (92% - 96%)        04-13 @ 07:01  -  04-14 @ 07:00  --------------------------------------------------------  IN: 0 mL / OUT: 300 mL / NET: -300 mL      CAPILLARY BLOOD GLUCOSE      POCT Blood Glucose.: 97 mg/dL (14 Apr 2020 06:45)      PHYSICAL EXAM:    General: NAD  HEENT: NC/AT; PERRL, clear conjunctiva  Neck: supple  Respiratory: Non-labored breathing  Cardiovascular: +S1/S2; RRR  Abdomen: soft, NT/ND; +BS x4  Extremities: WWP, 2+ peripheral pulses b/l; no LE edema  Skin: normal color and turgor; no rash  Neurological: AAOx2    MEDICATIONS:  MEDICATIONS  (STANDING):  allopurinol 100 milliGRAM(s) Oral daily  aspirin enteric coated 81 milliGRAM(s) Oral every 24 hours  atorvastatin 20 milliGRAM(s) Oral at bedtime  dextrose 5%. 1000 milliLiter(s) (50 mL/Hr) IV Continuous <Continuous>  dextrose 50% Injectable 12.5 Gram(s) IV Push once  dextrose 50% Injectable 25 Gram(s) IV Push once  dextrose 50% Injectable 25 Gram(s) IV Push once  doxazosin 8 milliGRAM(s) Oral at bedtime  famotidine    Tablet 20 milliGRAM(s) Oral every 48 hours  heparin  Injectable 5000 Unit(s) SubCutaneous every 8 hours  insulin lispro (HumaLOG) corrective regimen sliding scale   SubCutaneous Before meals and at bedtime  montelukast 10 milliGRAM(s) Oral every 24 hours  polyethylene glycol 3350 17 Gram(s) Oral two times a day  PPD  5 Tuberculin Unit(s) Injectable 5 Unit(s) IntraDermal once  sevelamer carbonate 1600 milliGRAM(s) Oral three times a day with meals    MEDICATIONS  (PRN):  acetaminophen   Tablet .. 650 milliGRAM(s) Oral every 6 hours PRN Temp greater or equal to 38C (100.4F), Mild Pain (1 - 3)  dextrose 40% Gel 15 Gram(s) Oral once PRN Blood Glucose LESS THAN 70 milliGRAM(s)/deciliter  glucagon  Injectable 1 milliGRAM(s) IntraMuscular once PRN Glucose LESS THAN 70 milligrams/deciliter  simethicone 80 milliGRAM(s) Chew daily PRN Gas      ALLERGIES:  Allergies    penicillin (Unknown)  sulfa drugs (Unknown)    Intolerances        LABS:                        9.3    9.55  )-----------( 245      ( 14 Apr 2020 07:45 )             28.8     04-14    136  |  93<L>  |  110<H>  ----------------------------<  87  4.0   |  23  |  8.30<H>    Ca    9.4      14 Apr 2020 07:45  Phos  11.7     04-14  Mg     2.9     04-14    TPro  6.3  /  Alb  3.2<L>  /  TBili  0.3  /  DBili  x   /  AST  31  /  ALT  31  /  AlkPhos  88  04-14          Daily COVID labs  Procalcitonin:   D-dimer: D-Dimer Assay, Quantitative: <150 ng/mL DDU (04-14-20 @ 07:45)  D-Dimer Assay, Quantitative: <150 ng/mL DDU (04-13-20 @ 08:54)  D-Dimer Assay, Quantitative: <150 ng/mL DDU (04-12-20 @ 10:58)  D-Dimer Assay, Quantitative: 156 ng/mL DDU (04-11-20 @ 09:35)    ESR:   CRP: C-Reactive Protein, Serum: 3.23 mg/dL (04-14-20 @ 07:45)  C-Reactive Protein, Serum: 4.87 mg/dL (04-13-20 @ 08:49)  C-Reactive Protein, Serum: 6.45 mg/dL (04-12-20 @ 10:58)  C-Reactive Protein, Serum: 7.69 mg/dL (04-11-20 @ 09:34)    LDH:   Ferritin: Ferritin, Serum: 1990 ng/mL (04-13-20 @ 08:51)  Ferritin, Serum: 1891 ng/mL (04-12-20 @ 10:58)  Ferritin, Serum: 1856 ng/mL (04-11-20 @ 09:34)    Lactate: lc  Trop I:   Ck:     Admission COVID Labs  Full T cell subset:   G6PD:   Immunoglobulins panel:   Quantiferon Gold Tb:   Triglyceride level:           RADIOLOGY & ADDITIONAL TESTS: Reviewed.

## 2020-04-14 NOTE — CONSULT NOTE ADULT - SUBJECTIVE AND OBJECTIVE BOX
Neurology Stroke Consult Note      HPI:  A 84 years old M with PMH of IDDM, HTN, HLD, CKD stage 4, sleep apnea (has CPAP machine), gout, Paget's s/p Reclast in 6/2014. 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 and verrucous acanthoma, presented from his nephrologist office (Dr. Orozco) because of fatigue and poor appetite and malaise w/ apparent increased creatinine in past week w/ productive cough. Found to be COVID positive and undergoing dialysis. Neurology was consulted to assess mental status of patient. Patient states that he is feeling down that he is in the hospital and is uncomfortable because has not been able to have a bowel movement. Otherwise the patient denies acute onset of numbness, weakness, tingling, slurred speech.     PAST MEDICAL & SURGICAL HISTORY:  Gout  HLD (hyperlipidemia)  HTN (hypertension)  CKD (chronic kidney disease)  DM (diabetes mellitus)      FAMILY HISTORY:      SOCIAL HISTORY:  Denies smoking, drinking, or drug use    ROS:  as per HPI       MEDICATIONS  (STANDING):  allopurinol 100 milliGRAM(s) Oral daily  aspirin enteric coated 81 milliGRAM(s) Oral every 24 hours  atorvastatin 20 milliGRAM(s) Oral at bedtime  dextrose 5%. 1000 milliLiter(s) (50 mL/Hr) IV Continuous <Continuous>  dextrose 50% Injectable 12.5 Gram(s) IV Push once  dextrose 50% Injectable 25 Gram(s) IV Push once  dextrose 50% Injectable 25 Gram(s) IV Push once  doxazosin 8 milliGRAM(s) Oral at bedtime  famotidine    Tablet 20 milliGRAM(s) Oral every 48 hours  heparin  Injectable 5000 Unit(s) SubCutaneous every 8 hours  insulin lispro (HumaLOG) corrective regimen sliding scale   SubCutaneous Before meals and at bedtime  montelukast 10 milliGRAM(s) Oral every 24 hours  polyethylene glycol 3350 17 Gram(s) Oral two times a day  PPD  5 Tuberculin Unit(s) Injectable 5 Unit(s) IntraDermal once  sevelamer carbonate 1600 milliGRAM(s) Oral three times a day with meals    MEDICATIONS  (PRN):  acetaminophen   Tablet .. 650 milliGRAM(s) Oral every 6 hours PRN Temp greater or equal to 38C (100.4F), Mild Pain (1 - 3)  dextrose 40% Gel 15 Gram(s) Oral once PRN Blood Glucose LESS THAN 70 milliGRAM(s)/deciliter  glucagon  Injectable 1 milliGRAM(s) IntraMuscular once PRN Glucose LESS THAN 70 milligrams/deciliter  simethicone 80 milliGRAM(s) Chew three times a day PRN Gas      Allergies    penicillin (Unknown)  sulfa drugs (Unknown)    Intolerances        Vital Signs Last 24 Hrs  T(C): 36.5 (14 Apr 2020 12:15), Max: 36.5 (14 Apr 2020 12:15)  T(F): 97.7 (14 Apr 2020 12:15), Max: 97.7 (14 Apr 2020 12:15)  HR: 69 (14 Apr 2020 12:55) (69 - 81)  BP: 128/63 (14 Apr 2020 12:55) (110/63 - 128/63)  BP(mean): --  RR: 20 (14 Apr 2020 12:55) (16 - 20)  SpO2: 98% (14 Apr 2020 12:55) (93% - 99%)      Physical exam:  General: No acute distress, awake and alert  Cardiovascular: Regular rate and rhythm, no murmurs, rubs, or gallops. No bruits  Pulmonary: Anterior breath sounds clear bilaterally, no crackles or wheezing. No use of accessory muscles  Extremities: Radial and DP pulses +2, no edema    Neurologic:  -Mental status: Awake, alert, oriented to person, place, and time. Speech is fluent with intact naming, repetition, and comprehension, no dysarthria. Recent and remote memory intact. Follows commands. Attention/concentration intact. Fund of knowledge appropriate.  -Cranial nerves:   II: Visual fields are full to confrontation.  III, IV, VI: Extraocular movements are intact without nystagmus. Pupils equally round and reactive to light  V:  Facial sensation V1-V3 equal and intact   VII: Face is symmetric with normal eye closure and smile  VIII: chronic hearing loss, has difficulty hearing provider, must speak loudly  Motor: Normal bulk and tone. No pronator drift. Strength bilateral upper extremity 5/5, bilateral lower extremities 5/5.  Sensation: Intact to light touch bilaterally. No neglect or extinction on double simultaneous testing.  Coordination: No dysmetria on finger-to-nose  Reflexes: Downgoing toes bilaterally     NIHSS: 0      LABS:                        9.3    9.55  )-----------( 245      ( 14 Apr 2020 07:45 )             28.8     04-14    136  |  93<L>  |  110<H>  ----------------------------<  87  4.0   |  23  |  8.30<H>    Ca    9.4      14 Apr 2020 07:45  Phos  11.7     04-14  Mg     2.9     04-14    TPro  6.3  /  Alb  3.2<L>  /  TBili  0.3  /  DBili  x   /  AST  31  /  ALT  31  /  AlkPhos  88  04-14    D-Dimer Assay, Quantitative: <150: The negative cutoff limit for DVT or PE is 230 D-DU ng/mL. This test  should be used as an aid in diagnosis and not be used to exclude deep  vein thrombosis or pulmonary embolism. ng/mL DDU (04.14.20 @ 07:45)    Ferritin, Serum: 1960 ng/mL (04.14.20 @ 07:45)    C-Reactive Protein, Serum: 3.23 mg/dL (04.14.20 @ 07:45)    Creatine Kinase, Serum: 288 U/L (04.01.20 @ 16:47)    Lactate, Blood: 1.0 mmol/L (04.01.20 @ 18:33)    RADIOLOGY & ADDITIONAL TESTS:  no cerebral imaging     Assessment and Plan  84y Male A 84 years old M with PMH of IDDM, HTN, HLD, CKD stage 4, sleep apnea (has CPAP machine), gout, Paget's s/p Reclast in 6/2014. 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 and verrucous acanthoma, presented from his nephrologist office (Dr. Jaffe?) because of fatigue and poor appetite and malaise w/ apparent increased creatinine in past week w/ productive cough. Found to be COVID positive and undergoing dialysis. Nonfocal neurological exam, patient is alert and oriented with strength 5/5 throughout. Neurologically stable, no evidence of COVID encephalopathy at this time,     - Inpatient management as per primary team   - No imaging of brain needed at this time   - Reconsult as necessary

## 2020-04-14 NOTE — PROGRESS NOTE ADULT - SUBJECTIVE AND OBJECTIVE BOX
CC: ESRD      INTERVAL HISTORY:  chart and labs reviewed - COVID+      ROS: No chest pain, no sob, no abd pain. No n/v/d    PAST MEDICAL & SURGICAL HISTORY:  Gout  HLD (hyperlipidemia)  HTN (hypertension)  HTN, age 0-18  CKD (chronic kidney disease)  DM (diabetes mellitus)      PHYSICAL EXAM:  T(C): 36.4 (04-14-20 @ 05:50), Max: 36.7 (04-13-20 @ 12:42)  HR: 81 (04-14-20 @ 05:50)  BP: 120/67 (04-14-20 @ 05:50) (111/63 - 127/60)  RR: 20 (04-14-20 @ 05:50)  SpO2: 93% (04-14-20 @ 05:50)  Wt(kg): --  I&O's Summary    13 Apr 2020 07:01  -  14 Apr 2020 07:00  --------------------------------------------------------  IN: 0 mL / OUT: 300 mL / NET: -300 mL      Weight   General: AAO x 3,  NAD.  HEENT: moist mucous membranes, no pallor/cyanosis.  Neck: no JVD visible.  Cardiac: S1, S2. RRR. No murmurs   Respratory: CTA b/l, no access muscle use.   Abdomen: soft. nontender. nondistended  Skin: no rashes.  Extremities: no LE edema b/l  Access:       DATA:                        9.3<L>  9.55  )-----------( 245      ( 14 Apr 2020 07:45 )             28.8<L>    Ferritin, Serum: 1990 ng/mL <H> (04-13 @ 08:51)      136    |  93<L>  |  110<H>  ----------------------------<  87     Ca:9.4   (14 Apr 2020 07:45)  4.0     |  23     |  8.30<H>      eGFR if Non : 5 <L>  eGFR if : 6 <L>    TPro  6.3    /  Alb  3.2<L>  /  TBili  0.3    /  DBili  x      /  AST  31     /  ALT  31     /  AlkPhos  88     14 Apr 2020 07:45                    MEDICATIONS  (STANDING):  allopurinol 100 milliGRAM(s) Oral daily  aspirin enteric coated 81 milliGRAM(s) Oral every 24 hours  atorvastatin 20 milliGRAM(s) Oral at bedtime  dextrose 5%. 1000 milliLiter(s) (50 mL/Hr) IV Continuous <Continuous>  dextrose 50% Injectable 12.5 Gram(s) IV Push once  dextrose 50% Injectable 25 Gram(s) IV Push once  dextrose 50% Injectable 25 Gram(s) IV Push once  doxazosin 8 milliGRAM(s) Oral at bedtime  famotidine    Tablet 20 milliGRAM(s) Oral every 48 hours  heparin  Injectable 5000 Unit(s) SubCutaneous every 8 hours  insulin lispro (HumaLOG) corrective regimen sliding scale   SubCutaneous Before meals and at bedtime  montelukast 10 milliGRAM(s) Oral every 24 hours  polyethylene glycol 3350 17 Gram(s) Oral two times a day  PPD  5 Tuberculin Unit(s) Injectable 5 Unit(s) IntraDermal once  sevelamer carbonate 1600 milliGRAM(s) Oral three times a day with meals    MEDICATIONS  (PRN):  acetaminophen   Tablet .. 650 milliGRAM(s) Oral every 6 hours PRN Temp greater or equal to 38C (100.4F), Mild Pain (1 - 3)  dextrose 40% Gel 15 Gram(s) Oral once PRN Blood Glucose LESS THAN 70 milliGRAM(s)/deciliter  glucagon  Injectable 1 milliGRAM(s) IntraMuscular once PRN Glucose LESS THAN 70 milligrams/deciliter  simethicone 80 milliGRAM(s) Chew daily PRN Gas

## 2020-04-15 LAB
ALBUMIN SERPL ELPH-MCNC: 3 G/DL — LOW (ref 3.3–5)
ALP SERPL-CCNC: 87 U/L — SIGNIFICANT CHANGE UP (ref 40–120)
ALT FLD-CCNC: 37 U/L — SIGNIFICANT CHANGE UP (ref 10–45)
ANION GAP SERPL CALC-SCNC: 16 MMOL/L — SIGNIFICANT CHANGE UP (ref 5–17)
AST SERPL-CCNC: 38 U/L — SIGNIFICANT CHANGE UP (ref 10–40)
BASOPHILS # BLD AUTO: 0.03 K/UL — SIGNIFICANT CHANGE UP (ref 0–0.2)
BASOPHILS NFR BLD AUTO: 0.4 % — SIGNIFICANT CHANGE UP (ref 0–2)
BILIRUB SERPL-MCNC: 0.3 MG/DL — SIGNIFICANT CHANGE UP (ref 0.2–1.2)
BUN SERPL-MCNC: 58 MG/DL — HIGH (ref 7–23)
CALCIUM SERPL-MCNC: 9.3 MG/DL — SIGNIFICANT CHANGE UP (ref 8.4–10.5)
CHLORIDE SERPL-SCNC: 95 MMOL/L — LOW (ref 96–108)
CO2 SERPL-SCNC: 26 MMOL/L — SIGNIFICANT CHANGE UP (ref 22–31)
CREAT SERPL-MCNC: 5.67 MG/DL — HIGH (ref 0.5–1.3)
CRP SERPL-MCNC: 1.8 MG/DL — HIGH (ref 0–0.4)
D DIMER BLD IA.RAPID-MCNC: 156 NG/ML DDU — SIGNIFICANT CHANGE UP
EOSINOPHIL # BLD AUTO: 0.13 K/UL — SIGNIFICANT CHANGE UP (ref 0–0.5)
EOSINOPHIL NFR BLD AUTO: 1.5 % — SIGNIFICANT CHANGE UP (ref 0–6)
FERRITIN SERPL-MCNC: 1905 NG/ML — HIGH (ref 30–400)
GLUCOSE BLDC GLUCOMTR-MCNC: 118 MG/DL — HIGH (ref 70–99)
GLUCOSE BLDC GLUCOMTR-MCNC: 142 MG/DL — HIGH (ref 70–99)
GLUCOSE BLDC GLUCOMTR-MCNC: 191 MG/DL — HIGH (ref 70–99)
GLUCOSE BLDC GLUCOMTR-MCNC: 216 MG/DL — HIGH (ref 70–99)
GLUCOSE SERPL-MCNC: 104 MG/DL — HIGH (ref 70–99)
HCT VFR BLD CALC: 28.6 % — LOW (ref 39–50)
HGB BLD-MCNC: 9.4 G/DL — LOW (ref 13–17)
IMM GRANULOCYTES NFR BLD AUTO: 4.2 % — HIGH (ref 0–1.5)
LYMPHOCYTES # BLD AUTO: 0.68 K/UL — LOW (ref 1–3.3)
LYMPHOCYTES # BLD AUTO: 8 % — LOW (ref 13–44)
MAGNESIUM SERPL-MCNC: 2.5 MG/DL — SIGNIFICANT CHANGE UP (ref 1.6–2.6)
MCHC RBC-ENTMCNC: 32.4 PG — SIGNIFICANT CHANGE UP (ref 27–34)
MCHC RBC-ENTMCNC: 32.9 GM/DL — SIGNIFICANT CHANGE UP (ref 32–36)
MCV RBC AUTO: 98.6 FL — SIGNIFICANT CHANGE UP (ref 80–100)
MONOCYTES # BLD AUTO: 0.83 K/UL — SIGNIFICANT CHANGE UP (ref 0–0.9)
MONOCYTES NFR BLD AUTO: 9.7 % — SIGNIFICANT CHANGE UP (ref 2–14)
NEUTROPHILS # BLD AUTO: 6.5 K/UL — SIGNIFICANT CHANGE UP (ref 1.8–7.4)
NEUTROPHILS NFR BLD AUTO: 76.2 % — SIGNIFICANT CHANGE UP (ref 43–77)
NRBC # BLD: 0 /100 WBCS — SIGNIFICANT CHANGE UP (ref 0–0)
PHOSPHATE SERPL-MCNC: 6.3 MG/DL — HIGH (ref 2.5–4.5)
PLATELET # BLD AUTO: 242 K/UL — SIGNIFICANT CHANGE UP (ref 150–400)
POTASSIUM SERPL-MCNC: 3.9 MMOL/L — SIGNIFICANT CHANGE UP (ref 3.5–5.3)
POTASSIUM SERPL-SCNC: 3.9 MMOL/L — SIGNIFICANT CHANGE UP (ref 3.5–5.3)
PROT SERPL-MCNC: 5.8 G/DL — LOW (ref 6–8.3)
RBC # BLD: 2.9 M/UL — LOW (ref 4.2–5.8)
RBC # FLD: 13.3 % — SIGNIFICANT CHANGE UP (ref 10.3–14.5)
SODIUM SERPL-SCNC: 137 MMOL/L — SIGNIFICANT CHANGE UP (ref 135–145)
WBC # BLD: 8.53 K/UL — SIGNIFICANT CHANGE UP (ref 3.8–10.5)
WBC # FLD AUTO: 8.53 K/UL — SIGNIFICANT CHANGE UP (ref 3.8–10.5)

## 2020-04-15 PROCEDURE — 99233 SBSQ HOSP IP/OBS HIGH 50: CPT | Mod: CS

## 2020-04-15 RX ADMIN — Medication 100 MILLIGRAM(S): at 12:12

## 2020-04-15 RX ADMIN — POLYETHYLENE GLYCOL 3350 17 GRAM(S): 17 POWDER, FOR SOLUTION ORAL at 16:53

## 2020-04-15 RX ADMIN — HEPARIN SODIUM 5000 UNIT(S): 5000 INJECTION INTRAVENOUS; SUBCUTANEOUS at 14:37

## 2020-04-15 RX ADMIN — MONTELUKAST 10 MILLIGRAM(S): 4 TABLET, CHEWABLE ORAL at 16:56

## 2020-04-15 RX ADMIN — SEVELAMER CARBONATE 1600 MILLIGRAM(S): 2400 POWDER, FOR SUSPENSION ORAL at 16:53

## 2020-04-15 RX ADMIN — Medication 4: at 16:53

## 2020-04-15 RX ADMIN — Medication 2: at 21:30

## 2020-04-15 RX ADMIN — Medication 8 MILLIGRAM(S): at 21:30

## 2020-04-15 RX ADMIN — SEVELAMER CARBONATE 1600 MILLIGRAM(S): 2400 POWDER, FOR SUSPENSION ORAL at 12:12

## 2020-04-15 RX ADMIN — ATORVASTATIN CALCIUM 20 MILLIGRAM(S): 80 TABLET, FILM COATED ORAL at 21:30

## 2020-04-15 RX ADMIN — HEPARIN SODIUM 5000 UNIT(S): 5000 INJECTION INTRAVENOUS; SUBCUTANEOUS at 05:10

## 2020-04-15 RX ADMIN — SEVELAMER CARBONATE 1600 MILLIGRAM(S): 2400 POWDER, FOR SUSPENSION ORAL at 06:12

## 2020-04-15 NOTE — PROGRESS NOTE ADULT - SUBJECTIVE AND OBJECTIVE BOX
OVERNIGHT EVENTS: ROSENDO    SUBJECTIVE / INTERVAL HPI: Patient seen and examined at bedside.     VITAL SIGNS:  Vital Signs Last 24 Hrs  T(C): 36.8 (15 Apr 2020 05:21), Max: 37.2 (15 Apr 2020 00:14)  T(F): 98.2 (15 Apr 2020 05:21), Max: 98.9 (15 Apr 2020 00:14)  HR: 64 (15 Apr 2020 08:45) (64 - 83)  BP: 117/59 (15 Apr 2020 08:45) (108/63 - 128/63)  BP(mean): --  RR: 22 (15 Apr 2020 08:45) (18 - 25)  SpO2: 97% (15 Apr 2020 08:45) (88% - 99%)    PHYSICAL EXAM:    General: WDWN, NAD, resting comfortably in bed  HEENT: NC/AT; anicteric sclera; MMM  Neck: supple  Cardiovascular: +S1/S2; RRR  Respiratory: CTA B/L; no W/R/R  Gastrointestinal: soft, NT/ND; +BSx4  Extremities: WWP; no edema, clubbing or cyanosis  Vascular: 2+ radial, DP/PT pulses B/L  Neurological: AAOx3    MEDICATIONS:  MEDICATIONS  (STANDING):  allopurinol 100 milliGRAM(s) Oral daily  aspirin enteric coated 81 milliGRAM(s) Oral every 24 hours  atorvastatin 20 milliGRAM(s) Oral at bedtime  dextrose 5%. 1000 milliLiter(s) (50 mL/Hr) IV Continuous <Continuous>  dextrose 50% Injectable 12.5 Gram(s) IV Push once  dextrose 50% Injectable 25 Gram(s) IV Push once  dextrose 50% Injectable 25 Gram(s) IV Push once  doxazosin 8 milliGRAM(s) Oral at bedtime  famotidine    Tablet 20 milliGRAM(s) Oral every 48 hours  heparin  Injectable 5000 Unit(s) SubCutaneous every 8 hours  insulin lispro (HumaLOG) corrective regimen sliding scale   SubCutaneous Before meals and at bedtime  montelukast 10 milliGRAM(s) Oral every 24 hours  polyethylene glycol 3350 17 Gram(s) Oral two times a day  PPD  5 Tuberculin Unit(s) Injectable 5 Unit(s) IntraDermal once  sevelamer carbonate 1600 milliGRAM(s) Oral three times a day with meals    MEDICATIONS  (PRN):  acetaminophen   Tablet .. 650 milliGRAM(s) Oral every 6 hours PRN Temp greater or equal to 38C (100.4F), Mild Pain (1 - 3)  dextrose 40% Gel 15 Gram(s) Oral once PRN Blood Glucose LESS THAN 70 milliGRAM(s)/deciliter  glucagon  Injectable 1 milliGRAM(s) IntraMuscular once PRN Glucose LESS THAN 70 milligrams/deciliter  simethicone 80 milliGRAM(s) Chew three times a day PRN Gas      ALLERGIES:  Allergies    penicillin (Unknown)  sulfa drugs (Unknown)    Intolerances        LABS:                        9.4    8.53  )-----------( 242      ( 15 Apr 2020 08:35 )             28.6     04-15    137  |  95<L>  |  58<H>  ----------------------------<  104<H>  3.9   |  26  |  5.67<H>    Ca    9.3      15 Apr 2020 08:35  Phos  6.3     04-15  Mg     2.5     04-15    TPro  5.8<L>  /  Alb  3.0<L>  /  TBili  0.3  /  DBili  x   /  AST  38  /  ALT  37  /  AlkPhos  87  04-15        CAPILLARY BLOOD GLUCOSE      POCT Blood Glucose.: 118 mg/dL (15 Apr 2020 06:49)      RADIOLOGY & ADDITIONAL TESTS: Reviewed. OVERNIGHT EVENTS: ROSENDO    SUBJECTIVE / INTERVAL HPI: Patient seen and examined at bedside. Pt reports breathing is the same. Denies fever, chills, chest pain, abd pain, n/v/d.    VITAL SIGNS:  Vital Signs Last 24 Hrs  T(C): 36.8 (15 Apr 2020 05:21), Max: 37.2 (15 Apr 2020 00:14)  T(F): 98.2 (15 Apr 2020 05:21), Max: 98.9 (15 Apr 2020 00:14)  HR: 64 (15 Apr 2020 08:45) (64 - 83)  BP: 117/59 (15 Apr 2020 08:45) (108/63 - 128/63)  BP(mean): --  RR: 22 (15 Apr 2020 08:45) (18 - 25)  SpO2: 97% (15 Apr 2020 08:45) (88% - 99%)    PHYSICAL EXAM:    General: WDWN, NAD, resting comfortably in bed on venturi mask  HEENT: NC/AT; anicteric sclera; MMM  Neck: supple  Cardiovascular: +S1/S2; RRR  Respiratory: bibasilar crackles  Gastrointestinal: soft, NT/ND; +BSx4  Extremities: WWP; no edema, clubbing or cyanosis  Vascular: 2+ radial, DP/PT pulses B/L  Neurological: AAOx3    MEDICATIONS:  MEDICATIONS  (STANDING):  allopurinol 100 milliGRAM(s) Oral daily  aspirin enteric coated 81 milliGRAM(s) Oral every 24 hours  atorvastatin 20 milliGRAM(s) Oral at bedtime  dextrose 5%. 1000 milliLiter(s) (50 mL/Hr) IV Continuous <Continuous>  dextrose 50% Injectable 12.5 Gram(s) IV Push once  dextrose 50% Injectable 25 Gram(s) IV Push once  dextrose 50% Injectable 25 Gram(s) IV Push once  doxazosin 8 milliGRAM(s) Oral at bedtime  famotidine    Tablet 20 milliGRAM(s) Oral every 48 hours  heparin  Injectable 5000 Unit(s) SubCutaneous every 8 hours  insulin lispro (HumaLOG) corrective regimen sliding scale   SubCutaneous Before meals and at bedtime  montelukast 10 milliGRAM(s) Oral every 24 hours  polyethylene glycol 3350 17 Gram(s) Oral two times a day  PPD  5 Tuberculin Unit(s) Injectable 5 Unit(s) IntraDermal once  sevelamer carbonate 1600 milliGRAM(s) Oral three times a day with meals    MEDICATIONS  (PRN):  acetaminophen   Tablet .. 650 milliGRAM(s) Oral every 6 hours PRN Temp greater or equal to 38C (100.4F), Mild Pain (1 - 3)  dextrose 40% Gel 15 Gram(s) Oral once PRN Blood Glucose LESS THAN 70 milliGRAM(s)/deciliter  glucagon  Injectable 1 milliGRAM(s) IntraMuscular once PRN Glucose LESS THAN 70 milligrams/deciliter  simethicone 80 milliGRAM(s) Chew three times a day PRN Gas      ALLERGIES:  Allergies    penicillin (Unknown)  sulfa drugs (Unknown)    Intolerances        LABS:                        9.4    8.53  )-----------( 242      ( 15 Apr 2020 08:35 )             28.6     04-15    137  |  95<L>  |  58<H>  ----------------------------<  104<H>  3.9   |  26  |  5.67<H>    Ca    9.3      15 Apr 2020 08:35  Phos  6.3     04-15  Mg     2.5     04-15    TPro  5.8<L>  /  Alb  3.0<L>  /  TBili  0.3  /  DBili  x   /  AST  38  /  ALT  37  /  AlkPhos  87  04-15        CAPILLARY BLOOD GLUCOSE      POCT Blood Glucose.: 118 mg/dL (15 Apr 2020 06:49)      RADIOLOGY & ADDITIONAL TESTS: Reviewed.

## 2020-04-15 NOTE — PROGRESS NOTE ADULT - PROBLEM SELECTOR PLAN 5
Pt on lasix 20 BID, norvasc 10 qd and doxazosin at home.  - Hold norvasc 10 daily  - Hold lasix  - Resumed doxazosin 8 daily

## 2020-04-15 NOTE — PROGRESS NOTE ADULT - PROBLEM SELECTOR PLAN 3
Pt with hx of CKD now presented with , Cr 8.16, (unknown baseline).   - renal is on board--> HD initiated 4/2/20 via R IJ temp catheter   - last dialysis 4/14  - PPD read as negative 4/5  - Daily CMP  - HD per nephrology  - Started sevelamer 1600 TID for elevated phos  - hold lokelma  - c/w Retacrit qWeek if hgB <10

## 2020-04-15 NOTE — PROGRESS NOTE ADULT - SUBJECTIVE AND OBJECTIVE BOX
CC: ESRD      INTERVAL HISTORY:  labs and chart reviewed  - COVID+      ROS: No chest pain, no sob, no abd pain. No n/v/d    PAST MEDICAL & SURGICAL HISTORY:  Gout  HLD (hyperlipidemia)  HTN (hypertension)  HTN, age 0-18  CKD (chronic kidney disease)  DM (diabetes mellitus)      PHYSICAL EXAM:  T(C): 36.8 (04-15-20 @ 05:21), Max: 37.2 (04-15-20 @ 00:14)  HR: 66 (04-15-20 @ 05:21)  BP: 116/61 (04-15-20 @ 05:21) (108/63 - 128/63)  RR: 20 (04-15-20 @ 05:21)  SpO2: 94% (04-15-20 @ 05:21)  Wt(kg): --  I&O's Summary    14 Apr 2020 07:01  -  15 Apr 2020 07:00  --------------------------------------------------------  IN: 0 mL / OUT: 400 mL / NET: -400 mL      Weight   General: AAO x 3,  NAD.  HEENT: moist mucous membranes, no pallor/cyanosis.  Neck: no JVD visible.  Cardiac: S1, S2. RRR. No murmurs   Respratory: CTA b/l, no access muscle use.   Abdomen: soft. nontender. nondistended  Skin: no rashes.  Extremities: no LE edema b/l  Access:       DATA:                        9.3<L>  9.55  )-----------( 245      ( 14 Apr 2020 07:45 )             28.8<L>    Ferritin, Serum: 1960 ng/mL <H> (04-14 @ 07:45)      136    |  93<L>  |  110<H>  ----------------------------<  87     Ca:9.4   (14 Apr 2020 07:45)  4.0     |  23     |  8.30<H>        TPro  6.3    /  Alb  3.2<L>  /  TBili  0.3    /  DBili  x      /  AST  31     /  ALT  31     /  AlkPhos  88     14 Apr 2020 07:45                    MEDICATIONS  (STANDING):  allopurinol 100 milliGRAM(s) Oral daily  aspirin enteric coated 81 milliGRAM(s) Oral every 24 hours  atorvastatin 20 milliGRAM(s) Oral at bedtime  dextrose 5%. 1000 milliLiter(s) (50 mL/Hr) IV Continuous <Continuous>  dextrose 50% Injectable 12.5 Gram(s) IV Push once  dextrose 50% Injectable 25 Gram(s) IV Push once  dextrose 50% Injectable 25 Gram(s) IV Push once  doxazosin 8 milliGRAM(s) Oral at bedtime  famotidine    Tablet 20 milliGRAM(s) Oral every 48 hours  heparin  Injectable 5000 Unit(s) SubCutaneous every 8 hours  insulin lispro (HumaLOG) corrective regimen sliding scale   SubCutaneous Before meals and at bedtime  montelukast 10 milliGRAM(s) Oral every 24 hours  polyethylene glycol 3350 17 Gram(s) Oral two times a day  PPD  5 Tuberculin Unit(s) Injectable 5 Unit(s) IntraDermal once  sevelamer carbonate 1600 milliGRAM(s) Oral three times a day with meals    MEDICATIONS  (PRN):  acetaminophen   Tablet .. 650 milliGRAM(s) Oral every 6 hours PRN Temp greater or equal to 38C (100.4F), Mild Pain (1 - 3)  dextrose 40% Gel 15 Gram(s) Oral once PRN Blood Glucose LESS THAN 70 milliGRAM(s)/deciliter  glucagon  Injectable 1 milliGRAM(s) IntraMuscular once PRN Glucose LESS THAN 70 milligrams/deciliter  simethicone 80 milliGRAM(s) Chew three times a day PRN Gas

## 2020-04-16 LAB
ALBUMIN SERPL ELPH-MCNC: 3 G/DL — LOW (ref 3.3–5)
ALP SERPL-CCNC: 100 U/L — SIGNIFICANT CHANGE UP (ref 40–120)
ALT FLD-CCNC: 44 U/L — SIGNIFICANT CHANGE UP (ref 10–45)
ANION GAP SERPL CALC-SCNC: 15 MMOL/L — SIGNIFICANT CHANGE UP (ref 5–17)
AST SERPL-CCNC: 41 U/L — HIGH (ref 10–40)
BASOPHILS # BLD AUTO: 0.03 K/UL — SIGNIFICANT CHANGE UP (ref 0–0.2)
BASOPHILS NFR BLD AUTO: 0.3 % — SIGNIFICANT CHANGE UP (ref 0–2)
BILIRUB SERPL-MCNC: 0.3 MG/DL — SIGNIFICANT CHANGE UP (ref 0.2–1.2)
BUN SERPL-MCNC: 73 MG/DL — HIGH (ref 7–23)
CALCIUM SERPL-MCNC: 9.8 MG/DL — SIGNIFICANT CHANGE UP (ref 8.4–10.5)
CHLORIDE SERPL-SCNC: 94 MMOL/L — LOW (ref 96–108)
CO2 SERPL-SCNC: 27 MMOL/L — SIGNIFICANT CHANGE UP (ref 22–31)
CREAT SERPL-MCNC: 6.41 MG/DL — HIGH (ref 0.5–1.3)
CRP SERPL-MCNC: 1.15 MG/DL — HIGH (ref 0–0.4)
D DIMER BLD IA.RAPID-MCNC: 234 NG/ML DDU — HIGH
EOSINOPHIL # BLD AUTO: 0.14 K/UL — SIGNIFICANT CHANGE UP (ref 0–0.5)
EOSINOPHIL NFR BLD AUTO: 1.6 % — SIGNIFICANT CHANGE UP (ref 0–6)
FERRITIN SERPL-MCNC: 1826 NG/ML — HIGH (ref 30–400)
GLUCOSE BLDC GLUCOMTR-MCNC: 125 MG/DL — HIGH (ref 70–99)
GLUCOSE BLDC GLUCOMTR-MCNC: 129 MG/DL — HIGH (ref 70–99)
GLUCOSE BLDC GLUCOMTR-MCNC: 158 MG/DL — HIGH (ref 70–99)
GLUCOSE SERPL-MCNC: 163 MG/DL — HIGH (ref 70–99)
HCT VFR BLD CALC: 29.1 % — LOW (ref 39–50)
HGB BLD-MCNC: 9.5 G/DL — LOW (ref 13–17)
IMM GRANULOCYTES NFR BLD AUTO: 4.2 % — HIGH (ref 0–1.5)
LYMPHOCYTES # BLD AUTO: 0.61 K/UL — LOW (ref 1–3.3)
LYMPHOCYTES # BLD AUTO: 6.9 % — LOW (ref 13–44)
MAGNESIUM SERPL-MCNC: 2.9 MG/DL — HIGH (ref 1.6–2.6)
MCHC RBC-ENTMCNC: 32.6 GM/DL — SIGNIFICANT CHANGE UP (ref 32–36)
MCHC RBC-ENTMCNC: 32.6 PG — SIGNIFICANT CHANGE UP (ref 27–34)
MCV RBC AUTO: 100 FL — SIGNIFICANT CHANGE UP (ref 80–100)
MONOCYTES # BLD AUTO: 0.89 K/UL — SIGNIFICANT CHANGE UP (ref 0–0.9)
MONOCYTES NFR BLD AUTO: 10.1 % — SIGNIFICANT CHANGE UP (ref 2–14)
NEUTROPHILS # BLD AUTO: 6.74 K/UL — SIGNIFICANT CHANGE UP (ref 1.8–7.4)
NEUTROPHILS NFR BLD AUTO: 76.9 % — SIGNIFICANT CHANGE UP (ref 43–77)
NRBC # BLD: 0 /100 WBCS — SIGNIFICANT CHANGE UP (ref 0–0)
PHOSPHATE SERPL-MCNC: 6.8 MG/DL — HIGH (ref 2.5–4.5)
PLATELET # BLD AUTO: 259 K/UL — SIGNIFICANT CHANGE UP (ref 150–400)
POTASSIUM SERPL-MCNC: 4.1 MMOL/L — SIGNIFICANT CHANGE UP (ref 3.5–5.3)
POTASSIUM SERPL-SCNC: 4.1 MMOL/L — SIGNIFICANT CHANGE UP (ref 3.5–5.3)
PROT SERPL-MCNC: 6 G/DL — SIGNIFICANT CHANGE UP (ref 6–8.3)
RBC # BLD: 2.91 M/UL — LOW (ref 4.2–5.8)
RBC # FLD: 13.1 % — SIGNIFICANT CHANGE UP (ref 10.3–14.5)
SODIUM SERPL-SCNC: 136 MMOL/L — SIGNIFICANT CHANGE UP (ref 135–145)
WBC # BLD: 8.78 K/UL — SIGNIFICANT CHANGE UP (ref 3.8–10.5)
WBC # FLD AUTO: 8.78 K/UL — SIGNIFICANT CHANGE UP (ref 3.8–10.5)

## 2020-04-16 PROCEDURE — 99233 SBSQ HOSP IP/OBS HIGH 50: CPT | Mod: CS

## 2020-04-16 PROCEDURE — 90935 HEMODIALYSIS ONE EVALUATION: CPT | Mod: CS

## 2020-04-16 RX ORDER — HEPARIN SODIUM 5000 [USP'U]/ML
5000 INJECTION INTRAVENOUS; SUBCUTANEOUS EVERY 8 HOURS
Refills: 0 | Status: DISCONTINUED | OUTPATIENT
Start: 2020-04-16 | End: 2020-04-22

## 2020-04-16 RX ORDER — ASPIRIN/CALCIUM CARB/MAGNESIUM 324 MG
81 TABLET ORAL DAILY
Refills: 0 | Status: DISCONTINUED | OUTPATIENT
Start: 2020-04-16 | End: 2020-04-22

## 2020-04-16 RX ORDER — ASPIRIN/CALCIUM CARB/MAGNESIUM 324 MG
81 TABLET ORAL DAILY
Refills: 0 | Status: DISCONTINUED | OUTPATIENT
Start: 2020-04-16 | End: 2020-04-16

## 2020-04-16 RX ADMIN — POLYETHYLENE GLYCOL 3350 17 GRAM(S): 17 POWDER, FOR SOLUTION ORAL at 17:13

## 2020-04-16 RX ADMIN — MONTELUKAST 10 MILLIGRAM(S): 4 TABLET, CHEWABLE ORAL at 17:13

## 2020-04-16 RX ADMIN — HEPARIN SODIUM 5000 UNIT(S): 5000 INJECTION INTRAVENOUS; SUBCUTANEOUS at 22:03

## 2020-04-16 RX ADMIN — Medication 30 MILLILITER(S): at 14:58

## 2020-04-16 RX ADMIN — Medication 2: at 07:02

## 2020-04-16 RX ADMIN — Medication 8 MILLIGRAM(S): at 22:03

## 2020-04-16 RX ADMIN — Medication 650 MILLIGRAM(S): at 14:59

## 2020-04-16 RX ADMIN — ATORVASTATIN CALCIUM 20 MILLIGRAM(S): 80 TABLET, FILM COATED ORAL at 22:03

## 2020-04-16 RX ADMIN — Medication 100 MILLIGRAM(S): at 15:11

## 2020-04-16 RX ADMIN — SEVELAMER CARBONATE 1600 MILLIGRAM(S): 2400 POWDER, FOR SUSPENSION ORAL at 17:13

## 2020-04-16 RX ADMIN — HEPARIN SODIUM 5000 UNIT(S): 5000 INJECTION INTRAVENOUS; SUBCUTANEOUS at 15:11

## 2020-04-16 RX ADMIN — FAMOTIDINE 20 MILLIGRAM(S): 10 INJECTION INTRAVENOUS at 05:09

## 2020-04-16 RX ADMIN — Medication 81 MILLIGRAM(S): at 15:11

## 2020-04-16 NOTE — PROGRESS NOTE ADULT - SUBJECTIVE AND OBJECTIVE BOX
Patient was seen and evaluated on dialysis.   HR: 72 (04-16-20 @ 10:07)  BP: 117/59 (04-16-20 @ 10:07)  Wt(kg): --  04-16    136  |  94<L>  |  73<H>  ----------------------------<  163<H>  4.1   |  27  |  6.41<H>    Ca    9.8      16 Apr 2020 08:24  Phos  6.8     04-16  Mg     2.9     04-16    TPro  6.0  /  Alb  3.0<L>  /  TBili  0.3  /  DBili  x   /  AST  41<H>  /  ALT  44  /  AlkPhos  100  04-16    Continue dialysis:   Dialyzer:       optiflux 180  QB: 400 cc/min   K bath: 2  Goal UF: 1.5 L  Duration: 180 min   Patient is tolerating the procedure well.   Continue full hemodialysis treatment as prescribed.

## 2020-04-16 NOTE — PROGRESS NOTE ADULT - PROBLEM SELECTOR PLAN 3
Pt with hx of CKD now presented with , Cr 8.16, (unknown baseline).   - renal is on board--> HD initiated 4/2/20 via R IJ temp catheter   - last dialysis 4/14  - PPD read as negative 4/5  - Daily CMP  - HD per nephrology  - Started sevelamer 1600 TID for elevated phos  - hold lokelma  - c/w Retacrit qWeek if hgB <10 Pt with hx of CKD now presented with , Cr 8.16, (unknown baseline).   - renal is on board--> HD initiated 4/2/20 via R IJ temp catheter. Plan for tunneled catheter by IR 4/17.  - Plan for HD today 4/16.  - PPD read as negative 4/5  - Daily CMP  - HD per nephrology  - Started sevelamer 1600 TID for elevated phos  - hold lokelma  - c/w Retacrit qWeek if hgB <10

## 2020-04-16 NOTE — PROGRESS NOTE ADULT - SUBJECTIVE AND OBJECTIVE BOX
OVERNIGHT EVENTS: ROSENDO    SUBJECTIVE / INTERVAL HPI: Patient seen and examined at bedside.     VITAL SIGNS:  Vital Signs Last 24 Hrs  T(C): 36.4 (16 Apr 2020 06:18), Max: 37 (16 Apr 2020 00:59)  T(F): 97.5 (16 Apr 2020 06:18), Max: 98.6 (16 Apr 2020 00:59)  HR: 74 (16 Apr 2020 05:36) (74 - 87)  BP: 118/60 (16 Apr 2020 05:36) (100/61 - 121/67)  BP(mean): --  RR: 20 (16 Apr 2020 05:36) (20 - 22)  SpO2: 97% (16 Apr 2020 05:36) (92% - 97%)    PHYSICAL EXAM:    General: WDWN, NAD, resting comfortably in bed  HEENT: NC/AT; anicteric sclera; MMM  Neck: supple  Cardiovascular: +S1/S2; RRR  Respiratory: CTA B/L; no W/R/R  Gastrointestinal: soft, NT/ND; +BSx4  Extremities: WWP; no edema, clubbing or cyanosis  Vascular: 2+ radial, DP/PT pulses B/L  Neurological: AAOx3    MEDICATIONS:  MEDICATIONS  (STANDING):  allopurinol 100 milliGRAM(s) Oral daily  aspirin enteric coated 81 milliGRAM(s) Oral daily  atorvastatin 20 milliGRAM(s) Oral at bedtime  dextrose 5%. 1000 milliLiter(s) (50 mL/Hr) IV Continuous <Continuous>  dextrose 50% Injectable 12.5 Gram(s) IV Push once  dextrose 50% Injectable 25 Gram(s) IV Push once  dextrose 50% Injectable 25 Gram(s) IV Push once  doxazosin 8 milliGRAM(s) Oral at bedtime  famotidine    Tablet 20 milliGRAM(s) Oral every 48 hours  heparin  Injectable 5000 Unit(s) SubCutaneous every 8 hours  insulin lispro (HumaLOG) corrective regimen sliding scale   SubCutaneous Before meals and at bedtime  montelukast 10 milliGRAM(s) Oral every 24 hours  polyethylene glycol 3350 17 Gram(s) Oral two times a day  sevelamer carbonate 1600 milliGRAM(s) Oral three times a day with meals    MEDICATIONS  (PRN):  acetaminophen   Tablet .. 650 milliGRAM(s) Oral every 6 hours PRN Temp greater or equal to 38C (100.4F), Mild Pain (1 - 3)  dextrose 40% Gel 15 Gram(s) Oral once PRN Blood Glucose LESS THAN 70 milliGRAM(s)/deciliter  glucagon  Injectable 1 milliGRAM(s) IntraMuscular once PRN Glucose LESS THAN 70 milligrams/deciliter  simethicone 80 milliGRAM(s) Chew three times a day PRN Gas      ALLERGIES:  Allergies    penicillin (Unknown)  sulfa drugs (Unknown)    Intolerances        LABS:                        9.5    8.78  )-----------( 259      ( 16 Apr 2020 08:24 )             29.1     04-16    136  |  94<L>  |  73<H>  ----------------------------<  163<H>  4.1   |  27  |  6.41<H>    Ca    9.8      16 Apr 2020 08:24  Phos  6.8     04-16  Mg     2.9     04-16    TPro  6.0  /  Alb  3.0<L>  /  TBili  0.3  /  DBili  x   /  AST  41<H>  /  ALT  44  /  AlkPhos  100  04-16        CAPILLARY BLOOD GLUCOSE      POCT Blood Glucose.: 158 mg/dL (16 Apr 2020 06:46)      RADIOLOGY & ADDITIONAL TESTS: Reviewed. OVERNIGHT EVENTS: ROSENDO    SUBJECTIVE / INTERVAL HPI: Patient seen and examined at bedside. Pt reports his breathing is about the same. States he has some gas pain, particularly after he eats and takes the renvela. Denies chest pain, abd pain, n/v/d.     VITAL SIGNS:  Vital Signs Last 24 Hrs  T(C): 36.4 (16 Apr 2020 06:18), Max: 37 (16 Apr 2020 00:59)  T(F): 97.5 (16 Apr 2020 06:18), Max: 98.6 (16 Apr 2020 00:59)  HR: 74 (16 Apr 2020 05:36) (74 - 87)  BP: 118/60 (16 Apr 2020 05:36) (100/61 - 121/67)  BP(mean): --  RR: 20 (16 Apr 2020 05:36) (20 - 22)  SpO2: 97% (16 Apr 2020 05:36) (92% - 97%)    PHYSICAL EXAM:    General: WDWN, NAD, resting comfortably in bed  HEENT: NC/AT; anicteric sclera; MMM  Neck: supple  Cardiovascular: +S1/S2; RRR  Respiratory: faint bibasilar crackles  Gastrointestinal: soft, NT/ND; +BSx4  Extremities: WWP; no edema, clubbing or cyanosis  Vascular: 2+ radial, DP/PT pulses B/L  Neurological: AAOx3    MEDICATIONS:  MEDICATIONS  (STANDING):  allopurinol 100 milliGRAM(s) Oral daily  aspirin enteric coated 81 milliGRAM(s) Oral daily  atorvastatin 20 milliGRAM(s) Oral at bedtime  dextrose 5%. 1000 milliLiter(s) (50 mL/Hr) IV Continuous <Continuous>  dextrose 50% Injectable 12.5 Gram(s) IV Push once  dextrose 50% Injectable 25 Gram(s) IV Push once  dextrose 50% Injectable 25 Gram(s) IV Push once  doxazosin 8 milliGRAM(s) Oral at bedtime  famotidine    Tablet 20 milliGRAM(s) Oral every 48 hours  heparin  Injectable 5000 Unit(s) SubCutaneous every 8 hours  insulin lispro (HumaLOG) corrective regimen sliding scale   SubCutaneous Before meals and at bedtime  montelukast 10 milliGRAM(s) Oral every 24 hours  polyethylene glycol 3350 17 Gram(s) Oral two times a day  sevelamer carbonate 1600 milliGRAM(s) Oral three times a day with meals    MEDICATIONS  (PRN):  acetaminophen   Tablet .. 650 milliGRAM(s) Oral every 6 hours PRN Temp greater or equal to 38C (100.4F), Mild Pain (1 - 3)  dextrose 40% Gel 15 Gram(s) Oral once PRN Blood Glucose LESS THAN 70 milliGRAM(s)/deciliter  glucagon  Injectable 1 milliGRAM(s) IntraMuscular once PRN Glucose LESS THAN 70 milligrams/deciliter  simethicone 80 milliGRAM(s) Chew three times a day PRN Gas      ALLERGIES:  Allergies    penicillin (Unknown)  sulfa drugs (Unknown)    Intolerances        LABS:                        9.5    8.78  )-----------( 259      ( 16 Apr 2020 08:24 )             29.1     04-16    136  |  94<L>  |  73<H>  ----------------------------<  163<H>  4.1   |  27  |  6.41<H>    Ca    9.8      16 Apr 2020 08:24  Phos  6.8     04-16  Mg     2.9     04-16    TPro  6.0  /  Alb  3.0<L>  /  TBili  0.3  /  DBili  x   /  AST  41<H>  /  ALT  44  /  AlkPhos  100  04-16        CAPILLARY BLOOD GLUCOSE      POCT Blood Glucose.: 158 mg/dL (16 Apr 2020 06:46)      RADIOLOGY & ADDITIONAL TESTS: Reviewed.

## 2020-04-16 NOTE — PROGRESS NOTE ADULT - ASSESSMENT
HPI: A 84 years old M with PMH of IDDM, HTN, HLD, CKD stage 4, sleep apnea (has CPAP machine), gout, Paget's s/p Reclast in 6/2014. 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 and verrucous acanthoma, presented from his nephrologist office (Dr. Jaffe?) because of fatigue and poor appetite and malaise w/ apparent increased creatinine in past week w/ productive cough.   COVID+ and undergoing dialysis. HPI: A 84 years old M with PMH of IDDM, HTN, HLD, CKD stage 4, sleep apnea (has CPAP machine), gout, Paget's s/p Reclast in 6/2014. 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 and verrucous acanthoma, presented from his nephrologist office (Dr. Dover) because of fatigue and poor appetite and malaise w/ apparent increased creatinine in past week w/ productive cough.   COVID+ and undergoing dialysis.

## 2020-04-16 NOTE — PROGRESS NOTE ADULT - PROBLEM SELECTOR PLAN 2
Likely viral pneumonia 2/2 to COVID-19 infection  -CXR showed Bilateral patchy/hazy opacities with a mid/lower lung zone   -COVID treatment initiated, as above  -c/w Venti, descalate as tolerated  -monitor for hypoxemia Likely viral pneumonia 2/2 to COVID-19 infection  -CXR showed Bilateral patchy/hazy opacities with a mid/lower lung zone   -COVID treatment initiated, as above  -currently on venti - desecalate to NC as tolerated  -monitor for hypoxemia

## 2020-04-17 LAB
ALBUMIN SERPL ELPH-MCNC: 3.3 G/DL — SIGNIFICANT CHANGE UP (ref 3.3–5)
ALP SERPL-CCNC: 89 U/L — SIGNIFICANT CHANGE UP (ref 40–120)
ALT FLD-CCNC: 47 U/L — HIGH (ref 10–45)
ANION GAP SERPL CALC-SCNC: 13 MMOL/L — SIGNIFICANT CHANGE UP (ref 5–17)
AST SERPL-CCNC: 37 U/L — SIGNIFICANT CHANGE UP (ref 10–40)
BASOPHILS # BLD AUTO: 0.05 K/UL — SIGNIFICANT CHANGE UP (ref 0–0.2)
BASOPHILS NFR BLD AUTO: 0.6 % — SIGNIFICANT CHANGE UP (ref 0–2)
BILIRUB SERPL-MCNC: 0.4 MG/DL — SIGNIFICANT CHANGE UP (ref 0.2–1.2)
BUN SERPL-MCNC: 41 MG/DL — HIGH (ref 7–23)
CALCIUM SERPL-MCNC: 9.5 MG/DL — SIGNIFICANT CHANGE UP (ref 8.4–10.5)
CHLORIDE SERPL-SCNC: 94 MMOL/L — LOW (ref 96–108)
CO2 SERPL-SCNC: 28 MMOL/L — SIGNIFICANT CHANGE UP (ref 22–31)
CREAT SERPL-MCNC: 5.48 MG/DL — HIGH (ref 0.5–1.3)
CRP SERPL-MCNC: 0.82 MG/DL — HIGH (ref 0–0.4)
D DIMER BLD IA.RAPID-MCNC: 252 NG/ML DDU — HIGH
EOSINOPHIL # BLD AUTO: 0.14 K/UL — SIGNIFICANT CHANGE UP (ref 0–0.5)
EOSINOPHIL NFR BLD AUTO: 1.7 % — SIGNIFICANT CHANGE UP (ref 0–6)
FERRITIN SERPL-MCNC: 1841 NG/ML — HIGH (ref 30–400)
GLUCOSE BLDC GLUCOMTR-MCNC: 136 MG/DL — HIGH (ref 70–99)
GLUCOSE BLDC GLUCOMTR-MCNC: 143 MG/DL — HIGH (ref 70–99)
GLUCOSE BLDC GLUCOMTR-MCNC: 157 MG/DL — HIGH (ref 70–99)
GLUCOSE BLDC GLUCOMTR-MCNC: 177 MG/DL — HIGH (ref 70–99)
GLUCOSE BLDC GLUCOMTR-MCNC: 181 MG/DL — HIGH (ref 70–99)
GLUCOSE SERPL-MCNC: 140 MG/DL — HIGH (ref 70–99)
HCT VFR BLD CALC: 30.7 % — LOW (ref 39–50)
HGB BLD-MCNC: 9.7 G/DL — LOW (ref 13–17)
IMM GRANULOCYTES NFR BLD AUTO: 4.2 % — HIGH (ref 0–1.5)
LYMPHOCYTES # BLD AUTO: 0.74 K/UL — LOW (ref 1–3.3)
LYMPHOCYTES # BLD AUTO: 9.2 % — LOW (ref 13–44)
MAGNESIUM SERPL-MCNC: 2.5 MG/DL — SIGNIFICANT CHANGE UP (ref 1.6–2.6)
MCHC RBC-ENTMCNC: 31.6 GM/DL — LOW (ref 32–36)
MCHC RBC-ENTMCNC: 32 PG — SIGNIFICANT CHANGE UP (ref 27–34)
MCV RBC AUTO: 101.3 FL — HIGH (ref 80–100)
MONOCYTES # BLD AUTO: 1.02 K/UL — HIGH (ref 0–0.9)
MONOCYTES NFR BLD AUTO: 12.7 % — SIGNIFICANT CHANGE UP (ref 2–14)
NEUTROPHILS # BLD AUTO: 5.74 K/UL — SIGNIFICANT CHANGE UP (ref 1.8–7.4)
NEUTROPHILS NFR BLD AUTO: 71.6 % — SIGNIFICANT CHANGE UP (ref 43–77)
NRBC # BLD: 0 /100 WBCS — SIGNIFICANT CHANGE UP (ref 0–0)
PHOSPHATE SERPL-MCNC: 5.1 MG/DL — HIGH (ref 2.5–4.5)
PLATELET # BLD AUTO: 253 K/UL — SIGNIFICANT CHANGE UP (ref 150–400)
POTASSIUM SERPL-MCNC: 4 MMOL/L — SIGNIFICANT CHANGE UP (ref 3.5–5.3)
POTASSIUM SERPL-SCNC: 4 MMOL/L — SIGNIFICANT CHANGE UP (ref 3.5–5.3)
PROT SERPL-MCNC: 6.1 G/DL — SIGNIFICANT CHANGE UP (ref 6–8.3)
RBC # BLD: 3.03 M/UL — LOW (ref 4.2–5.8)
RBC # FLD: 13.1 % — SIGNIFICANT CHANGE UP (ref 10.3–14.5)
SODIUM SERPL-SCNC: 135 MMOL/L — SIGNIFICANT CHANGE UP (ref 135–145)
WBC # BLD: 8.03 K/UL — SIGNIFICANT CHANGE UP (ref 3.8–10.5)
WBC # FLD AUTO: 8.03 K/UL — SIGNIFICANT CHANGE UP (ref 3.8–10.5)

## 2020-04-17 PROCEDURE — 99152 MOD SED SAME PHYS/QHP 5/>YRS: CPT | Mod: CS

## 2020-04-17 PROCEDURE — 36558 INSERT TUNNELED CV CATH: CPT | Mod: CS

## 2020-04-17 PROCEDURE — 77001 FLUOROGUIDE FOR VEIN DEVICE: CPT | Mod: 26,CS

## 2020-04-17 PROCEDURE — 99232 SBSQ HOSP IP/OBS MODERATE 35: CPT | Mod: CS

## 2020-04-17 RX ADMIN — Medication 2: at 11:53

## 2020-04-17 RX ADMIN — ATORVASTATIN CALCIUM 20 MILLIGRAM(S): 80 TABLET, FILM COATED ORAL at 21:44

## 2020-04-17 RX ADMIN — Medication 100 MILLIGRAM(S): at 11:48

## 2020-04-17 RX ADMIN — MONTELUKAST 10 MILLIGRAM(S): 4 TABLET, CHEWABLE ORAL at 18:58

## 2020-04-17 RX ADMIN — HEPARIN SODIUM 5000 UNIT(S): 5000 INJECTION INTRAVENOUS; SUBCUTANEOUS at 05:11

## 2020-04-17 RX ADMIN — Medication 81 MILLIGRAM(S): at 11:48

## 2020-04-17 RX ADMIN — POLYETHYLENE GLYCOL 3350 17 GRAM(S): 17 POWDER, FOR SOLUTION ORAL at 18:58

## 2020-04-17 RX ADMIN — HEPARIN SODIUM 5000 UNIT(S): 5000 INJECTION INTRAVENOUS; SUBCUTANEOUS at 15:33

## 2020-04-17 RX ADMIN — SEVELAMER CARBONATE 1600 MILLIGRAM(S): 2400 POWDER, FOR SUSPENSION ORAL at 11:48

## 2020-04-17 RX ADMIN — HEPARIN SODIUM 5000 UNIT(S): 5000 INJECTION INTRAVENOUS; SUBCUTANEOUS at 21:44

## 2020-04-17 RX ADMIN — SEVELAMER CARBONATE 1600 MILLIGRAM(S): 2400 POWDER, FOR SUSPENSION ORAL at 18:59

## 2020-04-17 RX ADMIN — Medication 2: at 21:44

## 2020-04-17 RX ADMIN — Medication 8 MILLIGRAM(S): at 21:44

## 2020-04-17 NOTE — PROGRESS NOTE ADULT - PROBLEM SELECTOR PLAN 2
Likely viral pneumonia 2/2 to COVID-19 infection  -CXR showed Bilateral patchy/hazy opacities with a mid/lower lung zone   -COVID treatment initiated, as above  -currently on NC   -monitor for hypoxemia

## 2020-04-17 NOTE — PROGRESS NOTE ADULT - PROBLEM SELECTOR PLAN 3
Pt with hx of CKD now presented with , Cr 8.16, (unknown baseline).   - renal is on board--> HD initiated 4/2/20 via R IJ temp catheter. Tunneled catheter 4/17 w/ IR.  - Last HD 4/16  - PPD read as negative 4/5  - Daily CMP  - HD per nephrology  - Started sevelamer 1600 TID for elevated phos  - hold lokelma  - c/w Retacrit qWeek if hgB <10

## 2020-04-17 NOTE — PROGRESS NOTE ADULT - SUBJECTIVE AND OBJECTIVE BOX
CC: ESRD      INTERVAL HISTORY:  chart and labs reviewed for COVID+ status      ROS: No chest pain, no sob, no abd pain. No n/v/d    PAST MEDICAL & SURGICAL HISTORY:  Gout  HLD (hyperlipidemia)  HTN (hypertension)  HTN, age 0-18  CKD (chronic kidney disease)  DM (diabetes mellitus)      PHYSICAL EXAM:  T(C): 37.1 (04-17-20 @ 05:29), Max: 37.3 (04-16-20 @ 20:36)  HR: 76 (04-17-20 @ 08:30)  BP: 121/63 (04-17-20 @ 08:30) (99/57 - 121/63)  RR: 20 (04-17-20 @ 08:30)  SpO2: 97% (04-17-20 @ 08:30)  Wt(kg): --  I&O's Summary    16 Apr 2020 07:01  -  17 Apr 2020 07:00  --------------------------------------------------------  IN: 0 mL / OUT: 800 mL / NET: -800 mL      Weight   General: AAO x 3,  NAD.  HEENT: moist mucous membranes, no pallor/cyanosis.  Neck: no JVD visible.  Cardiac: S1, S2. RRR. No murmurs   Respratory: CTA b/l, no access muscle use.   Abdomen: soft. nontender. nondistended  Skin: no rashes.  Extremities: no LE edema b/l  Access:       DATA:                        9.7<L>  8.03  )-----------( 253      ( 17 Apr 2020 07:18 )             30.7<L>    Ferritin, Serum: 1841 ng/mL <H> (04-17 @ 07:18)      135    |  94<L>  |  41<H>  ----------------------------<  140<H>  Ca:9.5   (17 Apr 2020 07:18)  4.0     |  28     |  5.48<H>      eGFR if Non : 9 <L>  eGFR if : 10 <L>    TPro  6.1    /  Alb  3.3    /  TBili  0.4    /  DBili  x      /  AST  37     /  ALT  47<H>  /  AlkPhos  89     17 Apr 2020 07:18                    MEDICATIONS  (STANDING):  allopurinol 100 milliGRAM(s) Oral daily  aspirin enteric coated 81 milliGRAM(s) Oral daily  atorvastatin 20 milliGRAM(s) Oral at bedtime  bisacodyl Suppository 10 milliGRAM(s) Rectal once  dextrose 5%. 1000 milliLiter(s) (50 mL/Hr) IV Continuous <Continuous>  dextrose 50% Injectable 12.5 Gram(s) IV Push once  dextrose 50% Injectable 25 Gram(s) IV Push once  dextrose 50% Injectable 25 Gram(s) IV Push once  doxazosin 8 milliGRAM(s) Oral at bedtime  famotidine    Tablet 20 milliGRAM(s) Oral every 48 hours  heparin  Injectable 5000 Unit(s) SubCutaneous every 8 hours  insulin lispro (HumaLOG) corrective regimen sliding scale   SubCutaneous Before meals and at bedtime  montelukast 10 milliGRAM(s) Oral every 24 hours  polyethylene glycol 3350 17 Gram(s) Oral two times a day  sevelamer carbonate 1600 milliGRAM(s) Oral three times a day with meals    MEDICATIONS  (PRN):  acetaminophen   Tablet .. 650 milliGRAM(s) Oral every 6 hours PRN Temp greater or equal to 38C (100.4F), Mild Pain (1 - 3)  aluminum hydroxide/magnesium hydroxide/simethicone Suspension 30 milliLiter(s) Oral every 6 hours PRN Dyspepsia  dextrose 40% Gel 15 Gram(s) Oral once PRN Blood Glucose LESS THAN 70 milliGRAM(s)/deciliter  glucagon  Injectable 1 milliGRAM(s) IntraMuscular once PRN Glucose LESS THAN 70 milligrams/deciliter  simethicone 80 milliGRAM(s) Chew three times a day PRN Gas

## 2020-04-17 NOTE — PROGRESS NOTE ADULT - ASSESSMENT
HPI: A 84 years old M with PMH of IDDM, HTN, HLD, CKD stage 4, sleep apnea (has CPAP machine), gout, Paget's s/p Reclast in 6/2014. 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 and verrucous acanthoma, presented from his nephrologist office (Dr. Dover) because of fatigue and poor appetite and malaise w/ apparent increased creatinine in past week w/ productive cough.   COVID+ and undergoing dialysis.

## 2020-04-17 NOTE — PROGRESS NOTE ADULT - SUBJECTIVE AND OBJECTIVE BOX
OVERNIGHT EVENTS: ROSENDO    SUBJECTIVE / INTERVAL HPI: Patient seen and examined at bedside. Pt reports his breathing is improved. States he feels some abdominal discomfort, has not had BM in 3-4 days. Denies n/v. No chest pain, fevers or chills. States he would like to go home.     VITAL SIGNS:  Vital Signs Last 24 Hrs  T(C): 37.1 (17 Apr 2020 05:29), Max: 37.3 (16 Apr 2020 20:36)  T(F): 98.7 (17 Apr 2020 05:29), Max: 99.2 (16 Apr 2020 20:36)  HR: 80 (17 Apr 2020 05:29) (68 - 80)  BP: 112/57 (17 Apr 2020 05:29) (99/57 - 117/59)  BP(mean): 80 (16 Apr 2020 13:05) (80 - 80)  RR: 20 (17 Apr 2020 05:29) (18 - 20)  SpO2: 98% (17 Apr 2020 05:29) (94% - 100%)    PHYSICAL EXAM:    General: WDWN, NAD, resting comfortably in bed  HEENT: NC/AT; anicteric sclera; MMM  Neck: supple  Cardiovascular: +S1/S2; RRR  Respiratory: faint bibasilar crackles  Gastrointestinal: soft, NT/ND; +BSx4  Extremities: WWP; no edema, clubbing or cyanosis  Vascular: 2+ radial, DP/PT pulses B/L  Neurological: AAOx3    MEDICATIONS:  MEDICATIONS  (STANDING):  allopurinol 100 milliGRAM(s) Oral daily  aspirin enteric coated 81 milliGRAM(s) Oral daily  atorvastatin 20 milliGRAM(s) Oral at bedtime  bisacodyl Suppository 10 milliGRAM(s) Rectal once  dextrose 5%. 1000 milliLiter(s) (50 mL/Hr) IV Continuous <Continuous>  dextrose 50% Injectable 12.5 Gram(s) IV Push once  dextrose 50% Injectable 25 Gram(s) IV Push once  dextrose 50% Injectable 25 Gram(s) IV Push once  doxazosin 8 milliGRAM(s) Oral at bedtime  famotidine    Tablet 20 milliGRAM(s) Oral every 48 hours  heparin  Injectable 5000 Unit(s) SubCutaneous every 8 hours  insulin lispro (HumaLOG) corrective regimen sliding scale   SubCutaneous Before meals and at bedtime  montelukast 10 milliGRAM(s) Oral every 24 hours  polyethylene glycol 3350 17 Gram(s) Oral two times a day  sevelamer carbonate 1600 milliGRAM(s) Oral three times a day with meals    MEDICATIONS  (PRN):  acetaminophen   Tablet .. 650 milliGRAM(s) Oral every 6 hours PRN Temp greater or equal to 38C (100.4F), Mild Pain (1 - 3)  aluminum hydroxide/magnesium hydroxide/simethicone Suspension 30 milliLiter(s) Oral every 6 hours PRN Dyspepsia  dextrose 40% Gel 15 Gram(s) Oral once PRN Blood Glucose LESS THAN 70 milliGRAM(s)/deciliter  glucagon  Injectable 1 milliGRAM(s) IntraMuscular once PRN Glucose LESS THAN 70 milligrams/deciliter  simethicone 80 milliGRAM(s) Chew three times a day PRN Gas      ALLERGIES:  Allergies    penicillin (Unknown)  sulfa drugs (Unknown)    Intolerances        LABS:                        9.7    8.03  )-----------( 253      ( 17 Apr 2020 07:18 )             30.7     04-17    135  |  94<L>  |  41<H>  ----------------------------<  140<H>  4.0   |  28  |  5.48<H>    Ca    9.5      17 Apr 2020 07:18  Phos  5.1     04-17  Mg     2.5     04-17    TPro  6.1  /  Alb  3.3  /  TBili  0.4  /  DBili  x   /  AST  37  /  ALT  47<H>  /  AlkPhos  89  04-17        CAPILLARY BLOOD GLUCOSE      POCT Blood Glucose.: 136 mg/dL (17 Apr 2020 06:38)      RADIOLOGY & ADDITIONAL TESTS: Reviewed. OVERNIGHT EVENTS: ROSENDO    SUBJECTIVE / INTERVAL HPI: Patient seen and examined at bedside. Pt reports his breathing is improved. States he feels some abdominal discomfort, has not had BM in 3-4 days. Denies n/v. No chest pain, fevers or chills.   VITAL SIGNS:  Vital Signs Last 24 Hrs  T(C): 37.1 (17 Apr 2020 05:29), Max: 37.3 (16 Apr 2020 20:36)  T(F): 98.7 (17 Apr 2020 05:29), Max: 99.2 (16 Apr 2020 20:36)  HR: 80 (17 Apr 2020 05:29) (68 - 80)  BP: 112/57 (17 Apr 2020 05:29) (99/57 - 117/59)  BP(mean): 80 (16 Apr 2020 13:05) (80 - 80)  RR: 20 (17 Apr 2020 05:29) (18 - 20)  SpO2: 98% (17 Apr 2020 05:29) (94% - 100%)    PHYSICAL EXAM:    General: WDWN, NAD, resting comfortably in bed  HEENT: NC/AT; anicteric sclera; MMM  Neck: supple  Cardiovascular: +S1/S2; RRR  Respiratory: faint bibasilar crackles  Gastrointestinal: soft, NT/ND; +BSx4  Extremities: WWP; no edema, clubbing or cyanosis  Vascular: 2+ radial, DP/PT pulses B/L  Neurological: AAOx3    MEDICATIONS:  MEDICATIONS  (STANDING):  allopurinol 100 milliGRAM(s) Oral daily  aspirin enteric coated 81 milliGRAM(s) Oral daily  atorvastatin 20 milliGRAM(s) Oral at bedtime  bisacodyl Suppository 10 milliGRAM(s) Rectal once  dextrose 5%. 1000 milliLiter(s) (50 mL/Hr) IV Continuous <Continuous>  dextrose 50% Injectable 12.5 Gram(s) IV Push once  dextrose 50% Injectable 25 Gram(s) IV Push once  dextrose 50% Injectable 25 Gram(s) IV Push once  doxazosin 8 milliGRAM(s) Oral at bedtime  famotidine    Tablet 20 milliGRAM(s) Oral every 48 hours  heparin  Injectable 5000 Unit(s) SubCutaneous every 8 hours  insulin lispro (HumaLOG) corrective regimen sliding scale   SubCutaneous Before meals and at bedtime  montelukast 10 milliGRAM(s) Oral every 24 hours  polyethylene glycol 3350 17 Gram(s) Oral two times a day  sevelamer carbonate 1600 milliGRAM(s) Oral three times a day with meals    MEDICATIONS  (PRN):  acetaminophen   Tablet .. 650 milliGRAM(s) Oral every 6 hours PRN Temp greater or equal to 38C (100.4F), Mild Pain (1 - 3)  aluminum hydroxide/magnesium hydroxide/simethicone Suspension 30 milliLiter(s) Oral every 6 hours PRN Dyspepsia  dextrose 40% Gel 15 Gram(s) Oral once PRN Blood Glucose LESS THAN 70 milliGRAM(s)/deciliter  glucagon  Injectable 1 milliGRAM(s) IntraMuscular once PRN Glucose LESS THAN 70 milligrams/deciliter  simethicone 80 milliGRAM(s) Chew three times a day PRN Gas      ALLERGIES:  Allergies    penicillin (Unknown)  sulfa drugs (Unknown)    Intolerances        LABS:                        9.7    8.03  )-----------( 253      ( 17 Apr 2020 07:18 )             30.7     04-17    135  |  94<L>  |  41<H>  ----------------------------<  140<H>  4.0   |  28  |  5.48<H>    Ca    9.5      17 Apr 2020 07:18  Phos  5.1     04-17  Mg     2.5     04-17    TPro  6.1  /  Alb  3.3  /  TBili  0.4  /  DBili  x   /  AST  37  /  ALT  47<H>  /  AlkPhos  89  04-17        CAPILLARY BLOOD GLUCOSE      POCT Blood Glucose.: 136 mg/dL (17 Apr 2020 06:38)      RADIOLOGY & ADDITIONAL TESTS: Reviewed. Transfer from [facility] to [facility] Course:  Pt is an 84 years old M with PMH of IDDM, HTN, HLD, CKD stage 5 now on HD, sleep apnea (has CPAP machine), gout, Paget's s/p Reclast in 6/2014. 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 and verrucous acanthoma, presented from his nephrologist office (Dr. Dover) because of fatigue and poor appetite and malaise w/ apparent increased creatinine in week w/ productive cough. Pt s/p azithro and plaquenil (4/1-4/6). Had RIJ placed of 4/2, now s/p tunneled catheter with IR on 4/17. Pt initially requiring NRB but has been titrated down to NC.     OVERNIGHT EVENTS: ROSENDO    SUBJECTIVE / INTERVAL HPI: Patient seen and examined at bedside. Pt reports his breathing is improved. States he feels some abdominal discomfort, has not had BM in 3-4 days. Denies n/v. No chest pain, fevers or chills.     VITAL SIGNS:  Vital Signs Last 24 Hrs  T(C): 37.1 (17 Apr 2020 05:29), Max: 37.3 (16 Apr 2020 20:36)  T(F): 98.7 (17 Apr 2020 05:29), Max: 99.2 (16 Apr 2020 20:36)  HR: 80 (17 Apr 2020 05:29) (68 - 80)  BP: 112/57 (17 Apr 2020 05:29) (99/57 - 117/59)  BP(mean): 80 (16 Apr 2020 13:05) (80 - 80)  RR: 20 (17 Apr 2020 05:29) (18 - 20)  SpO2: 98% (17 Apr 2020 05:29) (94% - 100%)    PHYSICAL EXAM:    General: WDWN, NAD, resting comfortably in bed  HEENT: NC/AT; anicteric sclera; MMM  Neck: supple  Cardiovascular: +S1/S2; RRR  Respiratory: faint bibasilar crackles  Gastrointestinal: soft, NT/ND; +BSx4  Extremities: WWP; no edema, clubbing or cyanosis  Vascular: 2+ radial, DP/PT pulses B/L  Neurological: AAOx3    MEDICATIONS:  MEDICATIONS  (STANDING):  allopurinol 100 milliGRAM(s) Oral daily  aspirin enteric coated 81 milliGRAM(s) Oral daily  atorvastatin 20 milliGRAM(s) Oral at bedtime  bisacodyl Suppository 10 milliGRAM(s) Rectal once  dextrose 5%. 1000 milliLiter(s) (50 mL/Hr) IV Continuous <Continuous>  dextrose 50% Injectable 12.5 Gram(s) IV Push once  dextrose 50% Injectable 25 Gram(s) IV Push once  dextrose 50% Injectable 25 Gram(s) IV Push once  doxazosin 8 milliGRAM(s) Oral at bedtime  famotidine    Tablet 20 milliGRAM(s) Oral every 48 hours  heparin  Injectable 5000 Unit(s) SubCutaneous every 8 hours  insulin lispro (HumaLOG) corrective regimen sliding scale   SubCutaneous Before meals and at bedtime  montelukast 10 milliGRAM(s) Oral every 24 hours  polyethylene glycol 3350 17 Gram(s) Oral two times a day  sevelamer carbonate 1600 milliGRAM(s) Oral three times a day with meals    MEDICATIONS  (PRN):  acetaminophen   Tablet .. 650 milliGRAM(s) Oral every 6 hours PRN Temp greater or equal to 38C (100.4F), Mild Pain (1 - 3)  aluminum hydroxide/magnesium hydroxide/simethicone Suspension 30 milliLiter(s) Oral every 6 hours PRN Dyspepsia  dextrose 40% Gel 15 Gram(s) Oral once PRN Blood Glucose LESS THAN 70 milliGRAM(s)/deciliter  glucagon  Injectable 1 milliGRAM(s) IntraMuscular once PRN Glucose LESS THAN 70 milligrams/deciliter  simethicone 80 milliGRAM(s) Chew three times a day PRN Gas      ALLERGIES:  Allergies    penicillin (Unknown)  sulfa drugs (Unknown)    Intolerances        LABS:                        9.7    8.03  )-----------( 253      ( 17 Apr 2020 07:18 )             30.7     04-17    135  |  94<L>  |  41<H>  ----------------------------<  140<H>  4.0   |  28  |  5.48<H>    Ca    9.5      17 Apr 2020 07:18  Phos  5.1     04-17  Mg     2.5     04-17    TPro  6.1  /  Alb  3.3  /  TBili  0.4  /  DBili  x   /  AST  37  /  ALT  47<H>  /  AlkPhos  89  04-17        CAPILLARY BLOOD GLUCOSE      POCT Blood Glucose.: 136 mg/dL (17 Apr 2020 06:38)      RADIOLOGY & ADDITIONAL TESTS: Reviewed. Hospital Course:  Pt is an 84 years old M with PMH of IDDM, HTN, HLD, CKD stage 5 now on HD, sleep apnea (has CPAP machine), gout, Paget's s/p Reclast in 6/2014. 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 and verrucous acanthoma, presented from his nephrologist office (Dr. Dover) because of fatigue and poor appetite and malaise w/ apparent increased creatinine in week w/ productive cough. Pt s/p azithro and plaquenil (4/1-4/6). Had RIJ placed of 4/2, now s/p tunneled catheter with IR on 4/17. Pt initially requiring NRB but has been titrated down to NC.     OVERNIGHT EVENTS: ROSENDO    SUBJECTIVE / INTERVAL HPI: Patient seen and examined at bedside. Pt reports his breathing is improved. States he feels some abdominal discomfort, has not had BM in 3-4 days. Denies n/v. No chest pain, fevers or chills.     VITAL SIGNS:  Vital Signs Last 24 Hrs  T(C): 37.1 (17 Apr 2020 05:29), Max: 37.3 (16 Apr 2020 20:36)  T(F): 98.7 (17 Apr 2020 05:29), Max: 99.2 (16 Apr 2020 20:36)  HR: 80 (17 Apr 2020 05:29) (68 - 80)  BP: 112/57 (17 Apr 2020 05:29) (99/57 - 117/59)  BP(mean): 80 (16 Apr 2020 13:05) (80 - 80)  RR: 20 (17 Apr 2020 05:29) (18 - 20)  SpO2: 98% (17 Apr 2020 05:29) (94% - 100%)    PHYSICAL EXAM:    General: WDWN, NAD, resting comfortably in bed  HEENT: NC/AT; anicteric sclera; MMM  Neck: supple  Cardiovascular: +S1/S2; RRR  Respiratory: faint bibasilar crackles  Gastrointestinal: soft, NT/ND; +BSx4  Extremities: WWP; no edema, clubbing or cyanosis  Vascular: 2+ radial, DP/PT pulses B/L  Neurological: AAOx3    MEDICATIONS:  MEDICATIONS  (STANDING):  allopurinol 100 milliGRAM(s) Oral daily  aspirin enteric coated 81 milliGRAM(s) Oral daily  atorvastatin 20 milliGRAM(s) Oral at bedtime  bisacodyl Suppository 10 milliGRAM(s) Rectal once  dextrose 5%. 1000 milliLiter(s) (50 mL/Hr) IV Continuous <Continuous>  dextrose 50% Injectable 12.5 Gram(s) IV Push once  dextrose 50% Injectable 25 Gram(s) IV Push once  dextrose 50% Injectable 25 Gram(s) IV Push once  doxazosin 8 milliGRAM(s) Oral at bedtime  famotidine    Tablet 20 milliGRAM(s) Oral every 48 hours  heparin  Injectable 5000 Unit(s) SubCutaneous every 8 hours  insulin lispro (HumaLOG) corrective regimen sliding scale   SubCutaneous Before meals and at bedtime  montelukast 10 milliGRAM(s) Oral every 24 hours  polyethylene glycol 3350 17 Gram(s) Oral two times a day  sevelamer carbonate 1600 milliGRAM(s) Oral three times a day with meals    MEDICATIONS  (PRN):  acetaminophen   Tablet .. 650 milliGRAM(s) Oral every 6 hours PRN Temp greater or equal to 38C (100.4F), Mild Pain (1 - 3)  aluminum hydroxide/magnesium hydroxide/simethicone Suspension 30 milliLiter(s) Oral every 6 hours PRN Dyspepsia  dextrose 40% Gel 15 Gram(s) Oral once PRN Blood Glucose LESS THAN 70 milliGRAM(s)/deciliter  glucagon  Injectable 1 milliGRAM(s) IntraMuscular once PRN Glucose LESS THAN 70 milligrams/deciliter  simethicone 80 milliGRAM(s) Chew three times a day PRN Gas      ALLERGIES:  Allergies    penicillin (Unknown)  sulfa drugs (Unknown)    Intolerances        LABS:                        9.7    8.03  )-----------( 253      ( 17 Apr 2020 07:18 )             30.7     04-17    135  |  94<L>  |  41<H>  ----------------------------<  140<H>  4.0   |  28  |  5.48<H>    Ca    9.5      17 Apr 2020 07:18  Phos  5.1     04-17  Mg     2.5     04-17    TPro  6.1  /  Alb  3.3  /  TBili  0.4  /  DBili  x   /  AST  37  /  ALT  47<H>  /  AlkPhos  89  04-17        CAPILLARY BLOOD GLUCOSE      POCT Blood Glucose.: 136 mg/dL (17 Apr 2020 06:38)      RADIOLOGY & ADDITIONAL TESTS: Reviewed.

## 2020-04-18 LAB
ALBUMIN SERPL ELPH-MCNC: 3.1 G/DL — LOW (ref 3.3–5)
ALP SERPL-CCNC: 94 U/L — SIGNIFICANT CHANGE UP (ref 40–120)
ALT FLD-CCNC: 43 U/L — SIGNIFICANT CHANGE UP (ref 10–45)
ANION GAP SERPL CALC-SCNC: 14 MMOL/L — SIGNIFICANT CHANGE UP (ref 5–17)
AST SERPL-CCNC: 31 U/L — SIGNIFICANT CHANGE UP (ref 10–40)
BASOPHILS # BLD AUTO: 0.14 K/UL — SIGNIFICANT CHANGE UP (ref 0–0.2)
BASOPHILS NFR BLD AUTO: 1.8 % — SIGNIFICANT CHANGE UP (ref 0–2)
BILIRUB SERPL-MCNC: 0.3 MG/DL — SIGNIFICANT CHANGE UP (ref 0.2–1.2)
BUN SERPL-MCNC: 56 MG/DL — HIGH (ref 7–23)
CALCIUM SERPL-MCNC: 9.8 MG/DL — SIGNIFICANT CHANGE UP (ref 8.4–10.5)
CHLORIDE SERPL-SCNC: 93 MMOL/L — LOW (ref 96–108)
CO2 SERPL-SCNC: 26 MMOL/L — SIGNIFICANT CHANGE UP (ref 22–31)
CREAT SERPL-MCNC: 6.79 MG/DL — HIGH (ref 0.5–1.3)
CRP SERPL-MCNC: 0.65 MG/DL — HIGH (ref 0–0.4)
D DIMER BLD IA.RAPID-MCNC: 177 NG/ML DDU — SIGNIFICANT CHANGE UP
EOSINOPHIL # BLD AUTO: 0.2 K/UL — SIGNIFICANT CHANGE UP (ref 0–0.5)
EOSINOPHIL NFR BLD AUTO: 2.6 % — SIGNIFICANT CHANGE UP (ref 0–6)
FERRITIN SERPL-MCNC: 1676 NG/ML — HIGH (ref 30–400)
GLUCOSE BLDC GLUCOMTR-MCNC: 145 MG/DL — HIGH (ref 70–99)
GLUCOSE BLDC GLUCOMTR-MCNC: 167 MG/DL — HIGH (ref 70–99)
GLUCOSE BLDC GLUCOMTR-MCNC: 206 MG/DL — HIGH (ref 70–99)
GLUCOSE SERPL-MCNC: 150 MG/DL — HIGH (ref 70–99)
HCT VFR BLD CALC: 29.7 % — LOW (ref 39–50)
HGB BLD-MCNC: 9.6 G/DL — LOW (ref 13–17)
LYMPHOCYTES # BLD AUTO: 0.53 K/UL — LOW (ref 1–3.3)
LYMPHOCYTES # BLD AUTO: 7 % — LOW (ref 13–44)
MAGNESIUM SERPL-MCNC: 2.9 MG/DL — HIGH (ref 1.6–2.6)
MCHC RBC-ENTMCNC: 32.3 GM/DL — SIGNIFICANT CHANGE UP (ref 32–36)
MCHC RBC-ENTMCNC: 32.7 PG — SIGNIFICANT CHANGE UP (ref 27–34)
MCV RBC AUTO: 101 FL — HIGH (ref 80–100)
MONOCYTES # BLD AUTO: 1.39 K/UL — HIGH (ref 0–0.9)
MONOCYTES NFR BLD AUTO: 18.4 % — HIGH (ref 2–14)
NEUTROPHILS # BLD AUTO: 5.22 K/UL — SIGNIFICANT CHANGE UP (ref 1.8–7.4)
NEUTROPHILS NFR BLD AUTO: 69.3 % — SIGNIFICANT CHANGE UP (ref 43–77)
PHOSPHATE SERPL-MCNC: 5.9 MG/DL — HIGH (ref 2.5–4.5)
PLATELET # BLD AUTO: 242 K/UL — SIGNIFICANT CHANGE UP (ref 150–400)
POTASSIUM SERPL-MCNC: 4.3 MMOL/L — SIGNIFICANT CHANGE UP (ref 3.5–5.3)
POTASSIUM SERPL-SCNC: 4.3 MMOL/L — SIGNIFICANT CHANGE UP (ref 3.5–5.3)
PROT SERPL-MCNC: 5.9 G/DL — LOW (ref 6–8.3)
RBC # BLD: 2.94 M/UL — LOW (ref 4.2–5.8)
RBC # FLD: 13.2 % — SIGNIFICANT CHANGE UP (ref 10.3–14.5)
SODIUM SERPL-SCNC: 133 MMOL/L — LOW (ref 135–145)
WBC # BLD: 7.53 K/UL — SIGNIFICANT CHANGE UP (ref 3.8–10.5)
WBC # FLD AUTO: 7.53 K/UL — SIGNIFICANT CHANGE UP (ref 3.8–10.5)

## 2020-04-18 PROCEDURE — 99232 SBSQ HOSP IP/OBS MODERATE 35: CPT | Mod: CS

## 2020-04-18 RX ADMIN — SEVELAMER CARBONATE 1600 MILLIGRAM(S): 2400 POWDER, FOR SUSPENSION ORAL at 09:05

## 2020-04-18 RX ADMIN — HEPARIN SODIUM 5000 UNIT(S): 5000 INJECTION INTRAVENOUS; SUBCUTANEOUS at 15:25

## 2020-04-18 RX ADMIN — ATORVASTATIN CALCIUM 20 MILLIGRAM(S): 80 TABLET, FILM COATED ORAL at 22:12

## 2020-04-18 RX ADMIN — Medication 8 MILLIGRAM(S): at 22:12

## 2020-04-18 RX ADMIN — POLYETHYLENE GLYCOL 3350 17 GRAM(S): 17 POWDER, FOR SOLUTION ORAL at 05:30

## 2020-04-18 RX ADMIN — Medication 81 MILLIGRAM(S): at 15:26

## 2020-04-18 RX ADMIN — Medication 100 MILLIGRAM(S): at 15:26

## 2020-04-18 RX ADMIN — Medication 10 MILLIGRAM(S): at 05:32

## 2020-04-18 RX ADMIN — HEPARIN SODIUM 5000 UNIT(S): 5000 INJECTION INTRAVENOUS; SUBCUTANEOUS at 22:12

## 2020-04-18 RX ADMIN — SEVELAMER CARBONATE 1600 MILLIGRAM(S): 2400 POWDER, FOR SUSPENSION ORAL at 17:23

## 2020-04-18 RX ADMIN — MONTELUKAST 10 MILLIGRAM(S): 4 TABLET, CHEWABLE ORAL at 17:24

## 2020-04-18 RX ADMIN — FAMOTIDINE 20 MILLIGRAM(S): 10 INJECTION INTRAVENOUS at 05:30

## 2020-04-18 RX ADMIN — Medication 2: at 08:54

## 2020-04-18 RX ADMIN — Medication 4: at 17:23

## 2020-04-18 RX ADMIN — HEPARIN SODIUM 5000 UNIT(S): 5000 INJECTION INTRAVENOUS; SUBCUTANEOUS at 05:30

## 2020-04-18 NOTE — CHART NOTE - NSCHARTNOTEFT_GEN_A_CORE
Admitting Diagnosis:   Patient is a 84y old  Male who presents with a chief complaint of fatigue (2020 09:30)      PAST MEDICAL & SURGICAL HISTORY:  Gout  HLD (hyperlipidemia)  HTN (hypertension)  CKD (chronic kidney disease)  DM (diabetes mellitus)      Current Nutrition Order :Mechanical soft/Low K, Low Phos and Nepro x2(850kcal and 38gmprotein       PO Intake: Good (%) [   ]  Fair (50-75%) [ x  ] Poor (<25%) [   ]Per SO patient is eating "fair amounts" finds the food "too salty"     GI Issues: None reported    Pain: not noted    Skin Integrity: Intact PU wise    Labs:       133<L>  |  93<L>  |  56<H>  ----------------------------<  150<H>  4.3   |  26  |  6.79<H>    Ca    9.8      2020 07:56  Phos  5.9       Mg     2.9         TPro  5.9<L>  /  Alb  3.1<L>  /  TBili  0.3  /  DBili  x   /  AST  31  /  ALT  43  /  AlkPhos  94  -18    CAPILLARY BLOOD GLUCOSE      POCT Blood Glucose.: 167 mg/dL (2020 08:14)  POCT Blood Glucose.: 181 mg/dL (2020 21:20)  POCT Blood Glucose.: 143 mg/dL (2020 18:22)  POCT Blood Glucose.: 157 mg/dL (2020 16:54)  POCT Blood Glucose.: 177 mg/dL (2020 11:24)      Medications:  MEDICATIONS  (STANDING):  allopurinol 100 milliGRAM(s) Oral daily  aspirin enteric coated 81 milliGRAM(s) Oral daily  atorvastatin 20 milliGRAM(s) Oral at bedtime  dextrose 5%. 1000 milliLiter(s) (50 mL/Hr) IV Continuous <Continuous>  dextrose 50% Injectable 12.5 Gram(s) IV Push once  dextrose 50% Injectable 25 Gram(s) IV Push once  dextrose 50% Injectable 25 Gram(s) IV Push once  doxazosin 8 milliGRAM(s) Oral at bedtime  famotidine    Tablet 20 milliGRAM(s) Oral every 48 hours  heparin  Injectable 5000 Unit(s) SubCutaneous every 8 hours  insulin lispro (HumaLOG) corrective regimen sliding scale   SubCutaneous Before meals and at bedtime  montelukast 10 milliGRAM(s) Oral every 24 hours  polyethylene glycol 3350 17 Gram(s) Oral two times a day  sevelamer carbonate 1600 milliGRAM(s) Oral three times a day with meals    MEDICATIONS  (PRN):  acetaminophen   Tablet .. 650 milliGRAM(s) Oral every 6 hours PRN Temp greater or equal to 38C (100.4F), Mild Pain (1 - 3)  aluminum hydroxide/magnesium hydroxide/simethicone Suspension 30 milliLiter(s) Oral every 6 hours PRN Dyspepsia  dextrose 40% Gel 15 Gram(s) Oral once PRN Blood Glucose LESS THAN 70 milliGRAM(s)/deciliter  glucagon  Injectable 1 milliGRAM(s) IntraMuscular once PRN Glucose LESS THAN 70 milligrams/deciliter  simethicone 80 milliGRAM(s) Chew three times a day PRN Gas      Weight:  Daily     Daily Weight in k.7 (2020 10:00)    Weight Change: Admitted weight:81.2kg 4/1 3lb weight loss suspected due to fluid shifts     Estimated energy needs: Based on ABW:79.7kg (due to between % of IBW).[id number]:1993-2391kcal and 1.4-1.6gm protein(HD and COVID demands)111gm-127gmprotein and fluids per team    Subjective: 85y/o male with history of 2DM,HTN,HLD,ESRD(new HD),Sleep Apnea, Gout, Pagets, Melanoma admitted with increased fatigue and COVID-19+ and new HD .RD attempted to call patient x2 with no answer. SO was called on phone and she provided additional information regarding eating and food preferences. Patient is eating more than 50%, but not 75% due to his reported food being "too salty". He does enjoy the Nepro and is drinking 2 a day. RD offered alternative food suggestions and will try and honor any additional food requests.Patient will need CCHO restriction added to diet order.  Previous Nutrition Diagnosis :Increased nutrient needs r/t increased demand for protein and nutrients AEB: HD/COVID-19+ demands  Active [x   ]  Resolved [   ]    If resolved, new PES:     Goal :Meet 80% of needs consistently     Recommendations:1.Encourage po 2.Add CCHO renal to diet order with 2 Nepro     Education: completed with SO and informed her that I would provide with literature for home use    Risk Level: High [   ] Moderate [  x ] Low [   ]

## 2020-04-18 NOTE — PROGRESS NOTE ADULT - ASSESSMENT
84 years old M with PMH of IDDM, HTN, HLD, CKD stage 4, sleep apnea (has CPAP machine), gout, Paget's, melanomas/p Moh's who presented from his nephrologist office (Dr. Dover) because of fatigue, poor appetite and malaise w/ apparent increased creatinine in past week w/ productive cough.  Found to be COVID+ and undergoing dialysis.

## 2020-04-18 NOTE — PROGRESS NOTE ADULT - PROBLEM SELECTOR PLAN 1
COVID 19 + (4/1)  - azithromycin 250 for 5 days total, complete 4/6  - plaquenil for 5 days (400 bid for the first day and 200 bid for the rest of the duration), complete 4/6  - Daily COVID labs: d-dimer, CRP and ferritin (downtrending today)

## 2020-04-18 NOTE — PROGRESS NOTE ADULT - PROBLEM SELECTOR PLAN 2
Likely viral pneumonia 2/2 to COVID-19 infection. CXR showed Bilateral patchy/hazy opacities with a mid/lower lung zone   -COVID treatment as above   -currently on NC, continue to wean as tolerated   -monitor for hypoxemia

## 2020-04-18 NOTE — PROGRESS NOTE ADULT - SUBJECTIVE AND OBJECTIVE BOX
CC: ESRD      INTERVAL HISTORY:  chart and labs reviewed      ROS: No chest pain, no sob, no abd pain. No n/v/d    PAST MEDICAL & SURGICAL HISTORY:  Gout  HLD (hyperlipidemia)  HTN (hypertension)  HTN, age 0-18  CKD (chronic kidney disease)  DM (diabetes mellitus)      PHYSICAL EXAM:  T(C): 36.4 (04-18-20 @ 06:35), Max: 36.4 (04-17-20 @ 14:52)  HR: 75 (04-18-20 @ 06:35)  BP: 100/58 (04-18-20 @ 06:35) (100/58 - 130/66)  RR: 18 (04-18-20 @ 06:35)  SpO2: 96% (04-18-20 @ 06:35)  Wt(kg): --  I&O's Summary    17 Apr 2020 07:01  -  18 Apr 2020 07:00  --------------------------------------------------------  IN: 120 mL / OUT: 0 mL / NET: 120 mL      Weight   General: AAO x 3,  NAD.  HEENT: moist mucous membranes, no pallor/cyanosis.  Neck: no JVD visible.  Cardiac: S1, S2. RRR. No murmurs   Respratory: CTA b/l, no access muscle use.   Abdomen: soft. nontender. nondistended  Skin: no rashes.  Extremities: no LE edema b/l  Access:       DATA:                        9.6<L>  7.53  )-----------( 242      ( 18 Apr 2020 07:56 )             29.7<L>    Ferritin, Serum: 1841 ng/mL <H> (04-17 @ 07:18)      135    |  94<L>  |  41<H>  ----------------------------<  140<H>  Ca:9.5   (17 Apr 2020 07:18)  4.0     |  28     |  5.48<H>        TPro  6.1    /  Alb  3.3    /  TBili  0.4    /  DBili  x      /  AST  37     /  ALT  47<H>  /  AlkPhos  89     17 Apr 2020 07:18                    MEDICATIONS  (STANDING):  allopurinol 100 milliGRAM(s) Oral daily  aspirin enteric coated 81 milliGRAM(s) Oral daily  atorvastatin 20 milliGRAM(s) Oral at bedtime  dextrose 5%. 1000 milliLiter(s) (50 mL/Hr) IV Continuous <Continuous>  dextrose 50% Injectable 12.5 Gram(s) IV Push once  dextrose 50% Injectable 25 Gram(s) IV Push once  dextrose 50% Injectable 25 Gram(s) IV Push once  doxazosin 8 milliGRAM(s) Oral at bedtime  famotidine    Tablet 20 milliGRAM(s) Oral every 48 hours  heparin  Injectable 5000 Unit(s) SubCutaneous every 8 hours  insulin lispro (HumaLOG) corrective regimen sliding scale   SubCutaneous Before meals and at bedtime  montelukast 10 milliGRAM(s) Oral every 24 hours  polyethylene glycol 3350 17 Gram(s) Oral two times a day  sevelamer carbonate 1600 milliGRAM(s) Oral three times a day with meals    MEDICATIONS  (PRN):  acetaminophen   Tablet .. 650 milliGRAM(s) Oral every 6 hours PRN Temp greater or equal to 38C (100.4F), Mild Pain (1 - 3)  aluminum hydroxide/magnesium hydroxide/simethicone Suspension 30 milliLiter(s) Oral every 6 hours PRN Dyspepsia  dextrose 40% Gel 15 Gram(s) Oral once PRN Blood Glucose LESS THAN 70 milliGRAM(s)/deciliter  glucagon  Injectable 1 milliGRAM(s) IntraMuscular once PRN Glucose LESS THAN 70 milligrams/deciliter  simethicone 80 milliGRAM(s) Chew three times a day PRN Gas

## 2020-04-18 NOTE — PROGRESS NOTE ADULT - PROBLEM SELECTOR PLAN 4
Pt with IDDM with lantus 24 units and prandial TID at home. 4/3 FS 51, holding lantus 20 hs now  - Moderate dose sliding scale  - A1c 5.7

## 2020-04-18 NOTE — PROGRESS NOTE ADULT - PROBLEM SELECTOR PLAN 1
hemodialysis today for UF of 1L as tolerated and clearance  check PTH (intact) to rule out secondary hyperparathyroidism  mild hyperphosphatemia -

## 2020-04-18 NOTE — PROGRESS NOTE ADULT - SUBJECTIVE AND OBJECTIVE BOX
OVERNIGHT EVENTS:    SUBJECTIVE / INTERVAL HPI: Patient seen and examined at bedside. No acute events overnight. Patient has no acute complaints. He feels well, his cough has improved, shortness of breath has improved. Denies CP. Has not had a BM over the past few days, at time of encounter had had suppository placed and was awaiting results. Eating and drinking well, urinating well.     VITAL SIGNS:  Vital Signs Last 24 Hrs  T(C): 36.4 (18 Apr 2020 06:35), Max: 36.4 (17 Apr 2020 14:52)  T(F): 97.6 (18 Apr 2020 06:35), Max: 97.6 (17 Apr 2020 21:15)  HR: 75 (18 Apr 2020 06:35) (75 - 88)  BP: 100/58 (18 Apr 2020 06:35) (100/58 - 130/66)  BP(mean): --  RR: 18 (18 Apr 2020 06:35) (17 - 20)  SpO2: 96% (18 Apr 2020 06:35) (85% - 96%)    PHYSICAL EXAM:    General: WDWN, sitting comfortably in bed eating breakfast   HEENT: NC/AT; PERRL, anicteric sclera; MMM  Neck: supple  Cardiovascular: +S1/S2, RRR  Respiratory: Bibasilar crackles appreciated, titrated to 4L NC spO2 94%, speaking in full sentences with no signs of respiratory distress.   Gastrointestinal: soft, NT/ND; +BSx4  Extremities: WWP; no edema, clubbing or cyanosis  Vascular: 2+ radial, DP/PT pulses B/L  Neurological: AAOx3; no focal deficits; CN II-XII grossly intact     MEDICATIONS:  MEDICATIONS  (STANDING):  allopurinol 100 milliGRAM(s) Oral daily  aspirin enteric coated 81 milliGRAM(s) Oral daily  atorvastatin 20 milliGRAM(s) Oral at bedtime  dextrose 5%. 1000 milliLiter(s) (50 mL/Hr) IV Continuous <Continuous>  dextrose 50% Injectable 12.5 Gram(s) IV Push once  dextrose 50% Injectable 25 Gram(s) IV Push once  dextrose 50% Injectable 25 Gram(s) IV Push once  doxazosin 8 milliGRAM(s) Oral at bedtime  famotidine    Tablet 20 milliGRAM(s) Oral every 48 hours  heparin  Injectable 5000 Unit(s) SubCutaneous every 8 hours  insulin lispro (HumaLOG) corrective regimen sliding scale   SubCutaneous Before meals and at bedtime  montelukast 10 milliGRAM(s) Oral every 24 hours  polyethylene glycol 3350 17 Gram(s) Oral two times a day  sevelamer carbonate 1600 milliGRAM(s) Oral three times a day with meals    MEDICATIONS  (PRN):  acetaminophen   Tablet .. 650 milliGRAM(s) Oral every 6 hours PRN Temp greater or equal to 38C (100.4F), Mild Pain (1 - 3)  aluminum hydroxide/magnesium hydroxide/simethicone Suspension 30 milliLiter(s) Oral every 6 hours PRN Dyspepsia  dextrose 40% Gel 15 Gram(s) Oral once PRN Blood Glucose LESS THAN 70 milliGRAM(s)/deciliter  glucagon  Injectable 1 milliGRAM(s) IntraMuscular once PRN Glucose LESS THAN 70 milligrams/deciliter  simethicone 80 milliGRAM(s) Chew three times a day PRN Gas    ALLERGIES:  Allergies    penicillin (Unknown)  sulfa drugs (Unknown)    Intolerances    LABS:                        9.6    7.53  )-----------( 242      ( 18 Apr 2020 07:56 )             29.7     04-18    133<L>  |  93<L>  |  56<H>  ----------------------------<  150<H>  4.3   |  26  |  6.79<H>    Ca    9.8      18 Apr 2020 07:56  Phos  5.9     04-18  Mg     2.9     04-18    TPro  5.9<L>  /  Alb  3.1<L>  /  TBili  0.3  /  DBili  x   /  AST  31  /  ALT  43  /  AlkPhos  94  04-18    Ferritin: 1841 --> 1676   C-RP: 0.82 --> 0.65   CAPILLARY BLOOD GLUCOSE    POCT Blood Glucose.: 167 mg/dL (18 Apr 2020 08:14)    RADIOLOGY & ADDITIONAL TESTS: Reviewed.

## 2020-04-18 NOTE — PROGRESS NOTE ADULT - PROBLEM SELECTOR PLAN 3
Pt with hx of CKD now presented with , Cr 8.16, (unknown baseline).   - HD initiated 4/2/20 via R IJ temp catheter  - Tunneled catheter 4/17 w/ IR, will go for HD today (4/18)  - Daily CMP  - Started sevelamer 1600 TID for elevated phos  - hold lokelma  - c/w Retacrit qWeek if hgB <10

## 2020-04-19 DIAGNOSIS — U07.1 COVID-19: ICD-10-CM

## 2020-04-19 LAB
GLUCOSE BLDC GLUCOMTR-MCNC: 136 MG/DL — HIGH (ref 70–99)
GLUCOSE BLDC GLUCOMTR-MCNC: 160 MG/DL — HIGH (ref 70–99)
GLUCOSE BLDC GLUCOMTR-MCNC: 169 MG/DL — HIGH (ref 70–99)
GLUCOSE BLDC GLUCOMTR-MCNC: 213 MG/DL — HIGH (ref 70–99)

## 2020-04-19 PROCEDURE — 99232 SBSQ HOSP IP/OBS MODERATE 35: CPT | Mod: CS

## 2020-04-19 RX ADMIN — POLYETHYLENE GLYCOL 3350 17 GRAM(S): 17 POWDER, FOR SOLUTION ORAL at 05:16

## 2020-04-19 RX ADMIN — Medication 81 MILLIGRAM(S): at 11:34

## 2020-04-19 RX ADMIN — ATORVASTATIN CALCIUM 20 MILLIGRAM(S): 80 TABLET, FILM COATED ORAL at 22:00

## 2020-04-19 RX ADMIN — Medication 2: at 08:47

## 2020-04-19 RX ADMIN — SEVELAMER CARBONATE 1600 MILLIGRAM(S): 2400 POWDER, FOR SUSPENSION ORAL at 18:20

## 2020-04-19 RX ADMIN — Medication 4: at 12:25

## 2020-04-19 RX ADMIN — Medication 2: at 22:10

## 2020-04-19 RX ADMIN — HEPARIN SODIUM 5000 UNIT(S): 5000 INJECTION INTRAVENOUS; SUBCUTANEOUS at 22:01

## 2020-04-19 RX ADMIN — SEVELAMER CARBONATE 1600 MILLIGRAM(S): 2400 POWDER, FOR SUSPENSION ORAL at 08:47

## 2020-04-19 RX ADMIN — MONTELUKAST 10 MILLIGRAM(S): 4 TABLET, CHEWABLE ORAL at 18:48

## 2020-04-19 RX ADMIN — Medication 8 MILLIGRAM(S): at 22:00

## 2020-04-19 RX ADMIN — Medication 100 MILLIGRAM(S): at 11:34

## 2020-04-19 RX ADMIN — HEPARIN SODIUM 5000 UNIT(S): 5000 INJECTION INTRAVENOUS; SUBCUTANEOUS at 05:16

## 2020-04-19 RX ADMIN — SEVELAMER CARBONATE 1600 MILLIGRAM(S): 2400 POWDER, FOR SUSPENSION ORAL at 18:48

## 2020-04-19 RX ADMIN — HEPARIN SODIUM 5000 UNIT(S): 5000 INJECTION INTRAVENOUS; SUBCUTANEOUS at 18:20

## 2020-04-19 NOTE — PROGRESS NOTE ADULT - PROBLEM SELECTOR PLAN 2
Likely viral pneumonia 2/2 to COVID-19 infection. CXR showed Bilateral patchy/hazy opacities with a mid/lower lung zone   -COVID treatment as above   -currently on NC, continue to wean as tolerated   -monitor for hypoxemia Pt with hx of CKD now presented with , Cr 8.16, (unknown baseline).   - HD initiated 4/2/20 via R IJ temp catheter  - Tunneled catheter 4/17 w/ IR  - s/p HD today on 4/18  - Daily CMP  - Started sevelamer 1600 TID for elevated phos  - hold lokelma  - c/w Retacrit qWeek if hgB <10

## 2020-04-19 NOTE — PROGRESS NOTE ADULT - PROBLEM SELECTOR PLAN 5
Pt on lasix 20 BID, norvasc 10 qd and doxazosin at home.  - Hold norvasc 10 daily  - Hold lasix  - Resumed doxazosin 8 daily Pt is on crestor 20 at home, therapeutic interchange  - cont with atorvastatin 20

## 2020-04-19 NOTE — PROGRESS NOTE ADULT - PROBLEM SELECTOR PLAN 3
Pt with hx of CKD now presented with , Cr 8.16, (unknown baseline).   - HD initiated 4/2/20 via R IJ temp catheter  - Tunneled catheter 4/17 w/ IR, will go for HD today (4/18)  - Daily CMP  - Started sevelamer 1600 TID for elevated phos  - hold lokelma  - c/w Retacrit qWeek if hgB <10 Pt with IDDM with lantus 24 units and prandial TID at home. 4/3 FS 51, holding lantus 20 hs now  - Moderate dose sliding scale  - A1c 5.7

## 2020-04-19 NOTE — PROGRESS NOTE ADULT - PROBLEM SELECTOR PLAN 6
Pt is on crestor 20 at home, therapeutic interchange  - cont with atorvastatin 20 Pt is on allopurinol 100 bid and PRN colchicine at home   - redose allopurinol 100 mg to once a day  - asymptomatic

## 2020-04-19 NOTE — PROGRESS NOTE ADULT - SUBJECTIVE AND OBJECTIVE BOX
OVERNIGHT EVENTS:    SUBJECTIVE / INTERVAL HPI: Patient seen and examined at bedside. No acute events overnight. Patient has no acute complaints. He feels well, his cough has improved, shortness of breath has improved. Denies CP. Has not had a BM over the past few days, at time of encounter had had suppository placed and was awaiting results. Eating and drinking well, urinating well.     VITAL SIGNS:  Vital Signs Last 24 Hrs  T(C): 36.4 (18 Apr 2020 06:35), Max: 36.4 (17 Apr 2020 14:52)  T(F): 97.6 (18 Apr 2020 06:35), Max: 97.6 (17 Apr 2020 21:15)  HR: 75 (18 Apr 2020 06:35) (75 - 88)  BP: 100/58 (18 Apr 2020 06:35) (100/58 - 130/66)  BP(mean): --  RR: 18 (18 Apr 2020 06:35) (17 - 20)  SpO2: 96% (18 Apr 2020 06:35) (85% - 96%)    PHYSICAL EXAM:    General: WDWN, sitting comfortably in bed eating breakfast   HEENT: NC/AT; PERRL, anicteric sclera; MMM  Neck: supple  Cardiovascular: +S1/S2, RRR  Respiratory: Bibasilar crackles appreciated, titrated to 4L NC spO2 94%, speaking in full sentences with no signs of respiratory distress.   Gastrointestinal: soft, NT/ND; +BSx4  Extremities: WWP; no edema, clubbing or cyanosis  Vascular: 2+ radial, DP/PT pulses B/L  Neurological: AAOx3; no focal deficits; CN II-XII grossly intact     MEDICATIONS:  MEDICATIONS  (STANDING):  allopurinol 100 milliGRAM(s) Oral daily  aspirin enteric coated 81 milliGRAM(s) Oral daily  atorvastatin 20 milliGRAM(s) Oral at bedtime  dextrose 5%. 1000 milliLiter(s) (50 mL/Hr) IV Continuous <Continuous>  dextrose 50% Injectable 12.5 Gram(s) IV Push once  dextrose 50% Injectable 25 Gram(s) IV Push once  dextrose 50% Injectable 25 Gram(s) IV Push once  doxazosin 8 milliGRAM(s) Oral at bedtime  famotidine    Tablet 20 milliGRAM(s) Oral every 48 hours  heparin  Injectable 5000 Unit(s) SubCutaneous every 8 hours  insulin lispro (HumaLOG) corrective regimen sliding scale   SubCutaneous Before meals and at bedtime  montelukast 10 milliGRAM(s) Oral every 24 hours  polyethylene glycol 3350 17 Gram(s) Oral two times a day  sevelamer carbonate 1600 milliGRAM(s) Oral three times a day with meals    MEDICATIONS  (PRN):  acetaminophen   Tablet .. 650 milliGRAM(s) Oral every 6 hours PRN Temp greater or equal to 38C (100.4F), Mild Pain (1 - 3)  aluminum hydroxide/magnesium hydroxide/simethicone Suspension 30 milliLiter(s) Oral every 6 hours PRN Dyspepsia  dextrose 40% Gel 15 Gram(s) Oral once PRN Blood Glucose LESS THAN 70 milliGRAM(s)/deciliter  glucagon  Injectable 1 milliGRAM(s) IntraMuscular once PRN Glucose LESS THAN 70 milligrams/deciliter  simethicone 80 milliGRAM(s) Chew three times a day PRN Gas    ALLERGIES:  Allergies    penicillin (Unknown)  sulfa drugs (Unknown)    Intolerances    LABS:                        9.6    7.53  )-----------( 242      ( 18 Apr 2020 07:56 )             29.7     04-18    133<L>  |  93<L>  |  56<H>  ----------------------------<  150<H>  4.3   |  26  |  6.79<H>    Ca    9.8      18 Apr 2020 07:56  Phos  5.9     04-18  Mg     2.9     04-18    TPro  5.9<L>  /  Alb  3.1<L>  /  TBili  0.3  /  DBili  x   /  AST  31  /  ALT  43  /  AlkPhos  94  04-18    Ferritin: 1841 --> 1676   C-RP: 0.82 --> 0.65   CAPILLARY BLOOD GLUCOSE    POCT Blood Glucose.: 167 mg/dL (18 Apr 2020 08:14)    RADIOLOGY & ADDITIONAL TESTS: Reviewed. OVERNIGHT EVENTS:    SUBJECTIVE / INTERVAL HPI: Patient seen and examined at bedside. No acute events overnight. Patient overall feels well, shortness of breathy and cough have improved. C/o left sided nasal congestion and weakness and diarrhea yesterday following suppository. Eating and drinking well, urinating well. Satting 94% 6L NC. 7 [facility] MEDICINE PROGRESS NOTE    SUBJECTIVE / INTERVAL HPI: Patient seen and examined at bedside. No acute events overnight. Patient overall feels well, shortness of breathy and cough have improved. C/o left sided nasal congestion and weakness and diarrhea yesterday following suppository. Eating and drinking well, urinating well. Satting 94% 6L NC.     VITAL SIGNS:  T(F): 98.9 (04-19-20 @ 05:47)  HR: 73 (04-19-20 @ 05:47)  BP: 104/61 (04-19-20 @ 05:47)  RR: 19 (04-19-20 @ 05:47)  SpO2: 91% (04-19-20 @ 05:47)  Wt(kg): --    PHYSICAL EXAM:    Constitutional: WDWN, NAD  HEENT: PERRL, EOMI, sclera non-icteric, neck supple, trachea midline, no masses, no JVD, MMM, good dentition  Respiratory: CTA b/l, good air entry b/l, no wheezing, no rhonchi, no rales, without accessory muscle use and no intercostal retractions  Cardiovascular: RRR, normal S1S2, no M/R/G  Gastrointestinal: soft, NTND, no masses palpable, BS normal  Extremities: Warm, well perfused, pulses equal bilateral upper and lower extremities, no edema, no clubbing  Neurological: AAOx3, CN Grossly intact  Skin: Normal temperature, warm, dry    MEDICATIONS  (STANDING):  allopurinol 100 milliGRAM(s) Oral daily  aspirin enteric coated 81 milliGRAM(s) Oral daily  atorvastatin 20 milliGRAM(s) Oral at bedtime  dextrose 5%. 1000 milliLiter(s) (50 mL/Hr) IV Continuous <Continuous>  dextrose 50% Injectable 12.5 Gram(s) IV Push once  dextrose 50% Injectable 25 Gram(s) IV Push once  dextrose 50% Injectable 25 Gram(s) IV Push once  doxazosin 8 milliGRAM(s) Oral at bedtime  famotidine    Tablet 20 milliGRAM(s) Oral every 48 hours  heparin  Injectable 5000 Unit(s) SubCutaneous every 8 hours  insulin lispro (HumaLOG) corrective regimen sliding scale   SubCutaneous Before meals and at bedtime  montelukast 10 milliGRAM(s) Oral every 24 hours  polyethylene glycol 3350 17 Gram(s) Oral two times a day  sevelamer carbonate 1600 milliGRAM(s) Oral three times a day with meals    MEDICATIONS  (PRN):  acetaminophen   Tablet .. 650 milliGRAM(s) Oral every 6 hours PRN Temp greater or equal to 38C (100.4F), Mild Pain (1 - 3)  aluminum hydroxide/magnesium hydroxide/simethicone Suspension 30 milliLiter(s) Oral every 6 hours PRN Dyspepsia  dextrose 40% Gel 15 Gram(s) Oral once PRN Blood Glucose LESS THAN 70 milliGRAM(s)/deciliter  glucagon  Injectable 1 milliGRAM(s) IntraMuscular once PRN Glucose LESS THAN 70 milligrams/deciliter  simethicone 80 milliGRAM(s) Chew three times a day PRN Gas    Allergies    penicillin (Unknown)  sulfa drugs (Unknown)    Intolerances      LABS:                        8.9    6.77  )-----------( 246      ( 19 Apr 2020 08:19 )             28.5     04-19    139  |  100  |  31<H>  ----------------------------<  157<H>  4.1   |  27  |  4.89<H>    Ca    9.4      19 Apr 2020 08:19  Phos  4.2     04-19  Mg     2.7     04-19    TPro  5.7<L>  /  Alb  3.2<L>  /  TBili  0.3  /  DBili  x   /  AST  31  /  ALT  42  /  AlkPhos  91  04-19      RADIOLOGY & ADDITIONAL TESTS:  Reviewed

## 2020-04-19 NOTE — PROGRESS NOTE ADULT - PROBLEM SELECTOR PLAN 7
Pt is on allopurinol 100 bid and PRN colchicine at home   - redose allopurinol 100 mg to once a day Fluids: none  Electrolytes-replete as needed per nephro  Nutrition - DASH/TLC/carb consistent  consult for PT placed 4/19

## 2020-04-19 NOTE — PROGRESS NOTE ADULT - PROBLEM SELECTOR PLAN 8
Fluids: none  Electrolytes-replete as needed per nephro  Nutrition - DASH/TLC/carb consistent Fluids: none  Electrolytes-replete as needed per nephro  Nutrition - DASH/TLC/carb consistent  consult for PT placed 4/19 DVT ppx: HSQ   GI ppx: pepcid   Code- Full Code  DIspo: [facility] DVT ppx: HSQ   GI ppx: pepcid   Code- Full Code  DIspo: PT consult called, will talk to social work, pending d/c to nursing home Monday

## 2020-04-19 NOTE — PROGRESS NOTE ADULT - PROBLEM SELECTOR PLAN 4
BP elevated-meds started and has PRN parameters. Pt alert but confused and plesant. Please remind pt that she is in [place], [place]. Tell her the date and time. Denies pain at this time-but endorses sig pain last night. Had PRN Tylenol available. Lidocaine patches placed to right shoulder and left hip. Right shoulder is purple/green in color right bicep area is edematous and green. LS dim, BS hypoactive-miralax given. Purewik for urination. Pt can be impulsive-long arm sit. Up A1+Gb+w for ambulation. Regular diet-room service-good appetite. Needs tray set up as she cant lift RIGHT arm well. Plan: Discharge back to prior [facility] tomorrow?    Pt with IDDM with lantus 24 units and prandial TID at home. 4/3 FS 51, holding lantus 20 hs now  - Moderate dose sliding scale  - A1c 5.7 Pt on lasix 20 BID, norvasc 10 qd and doxazosin at home.  - Hold norvasc 10 daily  - Hold lasix  - Resumed doxazosin 8 daily

## 2020-04-19 NOTE — PROGRESS NOTE ADULT - ASSESSMENT
84 years old M with PMH of IDDM, HTN, HLD, CKD stage 4, sleep apnea (has CPAP machine), gout, Paget's, melanoma s/p Moh's who presented from his nephrologist office (Dr. Dover) because of fatigue, poor appetite and malaise w/ apparent increased creatinine in past week w/ productive cough.  Found to be COVID+ and undergoing dialysis. 84 years old M with PMH of IDDM, HTN, HLD, CKD stage 4, sleep apnea (has CPAP machine), gout, Paget's, melanoma s/p Moh's who presented from his nephrologist office (Dr. Dover) because of fatigue, poor appetite and malaise w/ apparent increased creatinine in past week w/ productive cough.  Found to be COVID+ and undergoing dialysis. Satting 94% on 6L NC --> weaned to 93% 3L NC. 84 year old M with PMH of IDDM, HTN, HLD, CKD stage 4, sleep apnea (has CPAP machine), gout, Paget's, melanoma s/p Moh's who presented from his nephrologist office (Dr. Dover) because of fatigue, poor appetite and malaise w/ apparent increased creatinine in past week w/ productive cough.  Found to be COVID+ and undergoing dialysis. Satting 94% on 6L NC --> weaned to 93% 3L NC. 84 year old M with PMH of IDDM, HTN, HLD, CKD stage 4, sleep apnea (has CPAP machine), gout, Paget's, melanoma s/p Moh's who presented from his nephrologist office (Dr. Dover) because of fatigue, poor appetite and malaise w/ apparent increased creatinine in past week w/ productive cough. Found to be COVID+ and undergoing dialysis. Satting 94% on 6L NC --> weaned to 93% 3L NC. 84 year old M with PMH of IDDM, HTN, HLD, CKD stage 4, sleep apnea (has CPAP machine), gout, Paget's, melanoma s/p Moh's who presented from his nephrologist office (Dr. Dover) because of fatigue, poor appetite and malaise w/ apparent increased creatinine in past week w/ productive cough. Found to be COVID+ and undergoing dialysis. Satting 94% on 6L NC --> desatted to 88% on 3L NC --> went back up to 94% 5L NC.

## 2020-04-19 NOTE — PROGRESS NOTE ADULT - PROBLEM SELECTOR PLAN 1
COVID 19 + (4/1)  - azithromycin 250 for 5 days total, complete 4/6  - plaquenil for 5 days (400 bid for the first day and 200 bid for the rest of the duration), complete 4/6  - Daily COVID labs: d-dimer, CRP and ferritin (downtrending today) COVID 19 + (4/1)  - CXR showed Bilateral patchy/hazy opacities with a mid/lower lung zone   - s/p azithromycin 250 for 5 days total, complete 4/6  - s/p plaquenil for 5 days (400 bid for the first day and 200 bid for the rest of the duration), complete 4/6  - Daily COVID labs: d-dimer 223 (177), CRP 6.45 (7.69) and ferritin 1891 (1856)  - satting 93% on 6L --> able to wean to maintain 93% on 3L NC  - wean O2 as tolerate  - encourage sitting up COVID 19 + (4/1)  - CXR showed Bilateral patchy/hazy opacities with a mid/lower lung zone   - s/p azithromycin 250 for 5 days total, complete 4/6  - s/p plaquenil for 5 days (400 bid for the first day and 200 bid for the rest of the duration), complete 4/6  - Daily COVID labs: d-dimer 223 (177), CRP 6.45 (7.69) and ferritin 1891 (1856)  - wean O2 as tolerate  - encourage sitting up  - Satting 93% on 6L --> desatted to 88% on 3L NC --> then went back to 92% on 6L NC. COVID 19 + (4/1)  - CXR showed Bilateral patchy/hazy opacities with a mid/lower lung zone   - s/p azithromycin 250 for 5 days total, complete 4/6  - s/p plaquenil for 5 days (400 bid for the first day and 200 bid for the rest of the duration), complete 4/6  - Daily COVID labs: d-dimer 223 (177), CRP 6.45 (7.69) and ferritin 1891 (1856)  - wean O2 as tolerate  - encourage sitting up  - Satting 94% on 6L NC --> desatted to 88% on 3L NC --> went back up to 94% 5L NC.

## 2020-04-20 ENCOUNTER — TRANSCRIPTION ENCOUNTER (OUTPATIENT)
Age: 85
End: 2020-04-20

## 2020-04-20 LAB
ALBUMIN SERPL ELPH-MCNC: 2.9 G/DL — LOW (ref 3.3–5)
ALP SERPL-CCNC: 91 U/L — SIGNIFICANT CHANGE UP (ref 40–120)
ALT FLD-CCNC: 42 U/L — SIGNIFICANT CHANGE UP (ref 10–45)
ANION GAP SERPL CALC-SCNC: 12 MMOL/L — SIGNIFICANT CHANGE UP (ref 5–17)
AST SERPL-CCNC: 28 U/L — SIGNIFICANT CHANGE UP (ref 10–40)
BASOPHILS # BLD AUTO: 0.04 K/UL — SIGNIFICANT CHANGE UP (ref 0–0.2)
BASOPHILS NFR BLD AUTO: 0.6 % — SIGNIFICANT CHANGE UP (ref 0–2)
BILIRUB SERPL-MCNC: 0.3 MG/DL — SIGNIFICANT CHANGE UP (ref 0.2–1.2)
BUN SERPL-MCNC: 43 MG/DL — HIGH (ref 7–23)
CALCIUM SERPL-MCNC: 9.9 MG/DL — SIGNIFICANT CHANGE UP (ref 8.4–10.5)
CALCIUM SERPL-MCNC: 9.9 MG/DL — SIGNIFICANT CHANGE UP (ref 8.4–10.5)
CHLORIDE SERPL-SCNC: 97 MMOL/L — SIGNIFICANT CHANGE UP (ref 96–108)
CO2 SERPL-SCNC: 27 MMOL/L — SIGNIFICANT CHANGE UP (ref 22–31)
CREAT SERPL-MCNC: 5.92 MG/DL — HIGH (ref 0.5–1.3)
CRP SERPL-MCNC: 0.33 MG/DL — SIGNIFICANT CHANGE UP (ref 0–0.4)
D DIMER BLD IA.RAPID-MCNC: 225 NG/ML DDU — SIGNIFICANT CHANGE UP
EOSINOPHIL # BLD AUTO: 0.23 K/UL — SIGNIFICANT CHANGE UP (ref 0–0.5)
EOSINOPHIL NFR BLD AUTO: 3.5 % — SIGNIFICANT CHANGE UP (ref 0–6)
FERRITIN SERPL-MCNC: 1316 NG/ML — HIGH (ref 30–400)
GLUCOSE BLDC GLUCOMTR-MCNC: 133 MG/DL — HIGH (ref 70–99)
GLUCOSE BLDC GLUCOMTR-MCNC: 140 MG/DL — HIGH (ref 70–99)
GLUCOSE BLDC GLUCOMTR-MCNC: 141 MG/DL — HIGH (ref 70–99)
GLUCOSE BLDC GLUCOMTR-MCNC: 151 MG/DL — HIGH (ref 70–99)
GLUCOSE SERPL-MCNC: 134 MG/DL — HIGH (ref 70–99)
HCT VFR BLD CALC: 27.4 % — LOW (ref 39–50)
HGB BLD-MCNC: 8.7 G/DL — LOW (ref 13–17)
IMM GRANULOCYTES NFR BLD AUTO: 4.6 % — HIGH (ref 0–1.5)
LYMPHOCYTES # BLD AUTO: 1.05 K/UL — SIGNIFICANT CHANGE UP (ref 1–3.3)
LYMPHOCYTES # BLD AUTO: 16 % — SIGNIFICANT CHANGE UP (ref 13–44)
MAGNESIUM SERPL-MCNC: 2.9 MG/DL — HIGH (ref 1.6–2.6)
MCHC RBC-ENTMCNC: 31.8 GM/DL — LOW (ref 32–36)
MCHC RBC-ENTMCNC: 32 PG — SIGNIFICANT CHANGE UP (ref 27–34)
MCV RBC AUTO: 100.7 FL — HIGH (ref 80–100)
MONOCYTES # BLD AUTO: 0.92 K/UL — HIGH (ref 0–0.9)
MONOCYTES NFR BLD AUTO: 14 % — SIGNIFICANT CHANGE UP (ref 2–14)
NEUTROPHILS # BLD AUTO: 4.04 K/UL — SIGNIFICANT CHANGE UP (ref 1.8–7.4)
NEUTROPHILS NFR BLD AUTO: 61.3 % — SIGNIFICANT CHANGE UP (ref 43–77)
NRBC # BLD: 0 /100 WBCS — SIGNIFICANT CHANGE UP (ref 0–0)
PHOSPHATE SERPL-MCNC: 4.6 MG/DL — HIGH (ref 2.5–4.5)
PLATELET # BLD AUTO: 236 K/UL — SIGNIFICANT CHANGE UP (ref 150–400)
POTASSIUM SERPL-MCNC: 4.3 MMOL/L — SIGNIFICANT CHANGE UP (ref 3.5–5.3)
POTASSIUM SERPL-SCNC: 4.3 MMOL/L — SIGNIFICANT CHANGE UP (ref 3.5–5.3)
PROT SERPL-MCNC: 5.5 G/DL — LOW (ref 6–8.3)
PTH-INTACT FLD-MCNC: 37 PG/ML — SIGNIFICANT CHANGE UP (ref 15–65)
RBC # BLD: 2.72 M/UL — LOW (ref 4.2–5.8)
RBC # FLD: 13.1 % — SIGNIFICANT CHANGE UP (ref 10.3–14.5)
SODIUM SERPL-SCNC: 136 MMOL/L — SIGNIFICANT CHANGE UP (ref 135–145)
WBC # BLD: 6.58 K/UL — SIGNIFICANT CHANGE UP (ref 3.8–10.5)
WBC # FLD AUTO: 6.58 K/UL — SIGNIFICANT CHANGE UP (ref 3.8–10.5)

## 2020-04-20 PROCEDURE — 99233 SBSQ HOSP IP/OBS HIGH 50: CPT | Mod: CS

## 2020-04-20 PROCEDURE — 90935 HEMODIALYSIS ONE EVALUATION: CPT | Mod: CS

## 2020-04-20 RX ORDER — ALBUMIN HUMAN 25 %
50 VIAL (ML) INTRAVENOUS
Refills: 0 | Status: DISCONTINUED | OUTPATIENT
Start: 2020-04-20 | End: 2020-04-22

## 2020-04-20 RX ADMIN — ATORVASTATIN CALCIUM 20 MILLIGRAM(S): 80 TABLET, FILM COATED ORAL at 21:46

## 2020-04-20 RX ADMIN — POLYETHYLENE GLYCOL 3350 17 GRAM(S): 17 POWDER, FOR SOLUTION ORAL at 16:53

## 2020-04-20 RX ADMIN — Medication 81 MILLIGRAM(S): at 16:51

## 2020-04-20 RX ADMIN — Medication 2: at 12:36

## 2020-04-20 RX ADMIN — HEPARIN SODIUM 5000 UNIT(S): 5000 INJECTION INTRAVENOUS; SUBCUTANEOUS at 16:51

## 2020-04-20 RX ADMIN — MONTELUKAST 10 MILLIGRAM(S): 4 TABLET, CHEWABLE ORAL at 17:03

## 2020-04-20 RX ADMIN — HEPARIN SODIUM 5000 UNIT(S): 5000 INJECTION INTRAVENOUS; SUBCUTANEOUS at 21:46

## 2020-04-20 RX ADMIN — FAMOTIDINE 20 MILLIGRAM(S): 10 INJECTION INTRAVENOUS at 05:39

## 2020-04-20 RX ADMIN — POLYETHYLENE GLYCOL 3350 17 GRAM(S): 17 POWDER, FOR SOLUTION ORAL at 05:39

## 2020-04-20 RX ADMIN — Medication 8 MILLIGRAM(S): at 21:46

## 2020-04-20 RX ADMIN — SEVELAMER CARBONATE 1600 MILLIGRAM(S): 2400 POWDER, FOR SUSPENSION ORAL at 08:50

## 2020-04-20 RX ADMIN — HEPARIN SODIUM 5000 UNIT(S): 5000 INJECTION INTRAVENOUS; SUBCUTANEOUS at 05:39

## 2020-04-20 RX ADMIN — Medication 100 MILLIGRAM(S): at 16:50

## 2020-04-20 RX ADMIN — Medication 50 MILLILITER(S): at 14:33

## 2020-04-20 RX ADMIN — SIMETHICONE 80 MILLIGRAM(S): 80 TABLET, CHEWABLE ORAL at 16:52

## 2020-04-20 RX ADMIN — SEVELAMER CARBONATE 1600 MILLIGRAM(S): 2400 POWDER, FOR SUSPENSION ORAL at 16:50

## 2020-04-20 NOTE — CHART NOTE - NSCHARTNOTEFT_GEN_A_CORE
Patient requiring 3L NC, SpO2 93% as of 4/20. Most recently desaturated to 88% on 3L NC on 4/18 while in bed. O2 requirements have not been below 3L NC throughout the hospitalization. Patient requiring 3L NC, SpO2 93% as of 4/20. Most recently desaturated to 87% on 3L NC on 4/18 while in bed and with ambulation saturates < 87%. O2 requirements have not been below 3L NC throughout the hospitalization.

## 2020-04-20 NOTE — PROGRESS NOTE ADULT - PROBLEM SELECTOR PLAN 7
Fluids: none  Electrolytes-replete as needed per nephro  Nutrition - DASH/TLC/carb consistent  consult for PT placed 4/19

## 2020-04-20 NOTE — PROGRESS NOTE ADULT - SUBJECTIVE AND OBJECTIVE BOX
CC: ESRD      INTERVAL HISTORY:  chart and labs reviewed      ROS: No chest pain, no sob, no abd pain. No n/v/d    PAST MEDICAL & SURGICAL HISTORY:  Gout  HLD (hyperlipidemia)  HTN (hypertension)  HTN, age 0-18  CKD (chronic kidney disease)  DM (diabetes mellitus)      PHYSICAL EXAM:  T(C): 36.4  HR: 75   BP: 110/58  RR: 18  SpO2: 96% 5Lnc    General: AAO x 3,  NAD.  HEENT: moist mucous membranes, no pallor/cyanosis.  Neck: no JVD visible.  Cardiac: S1, S2. RRR. No murmurs   Respratory: CTA b/l, no access muscle use.   Abdomen: soft. nontender. nondistended  Skin: no rashes.  Extremities: no LE edema b/l  Access:       DATA: reviewed in detail             dextrose 5%. 1000 milliLiter(s) (50 mL/Hr) IV Continuous <Continuous>  dextrose 50% Injectable 12.5 Gram(s) IV Push once  dextrose 50% Injectable 25 Gram(s) IV Push once  dextrose 50% Injectable 25 Gram(s) IV Push once  doxazosin 8 milliGRAM(s) Oral at bedtime  famotidine    Tablet 20 milliGRAM(s) Oral every 48 hours  heparin  Injectable 5000 Unit(s) SubCutaneous every 8 hours  insulin lispro (HumaLOG) corrective regimen sliding scale   SubCutaneous Before meals and at bedtime  montelukast 10 milliGRAM(s) Oral every 24 hours  polyethylene glycol 3350 17 Gram(s) Oral two times a day  sevelamer carbonate 1600 milliGRAM(s) Oral three times a day with meals    MEDICATIONS  (PRN):  acetaminophen   Tablet .. 650 milliGRAM(s) Oral every 6 hours PRN Temp greater or equal to 38C (100.4F), Mild Pain (1 - 3)  aluminum hydroxide/magnesium hydroxide/simethicone Suspension 30 milliLiter(s) Oral every 6 hours PRN Dyspepsia  dextrose 40% Gel 15 Gram(s) Oral once PRN Blood Glucose LESS THAN 70 milliGRAM(s)/deciliter  glucagon  Injectable 1 milliGRAM(s) IntraMuscular once PRN Glucose LESS THAN 70 milligrams/deciliter  simethicone 80 milliGRAM(s) Chew three times a day PRN Gas CC: GRACIE on CKD V       INTERVAL HISTORY:  chart and labs reviewed      ROS: No chest pain, no sob, no abd pain. No n/v/d    PAST MEDICAL & SURGICAL HISTORY:  Gout  HLD (hyperlipidemia)  HTN (hypertension)  HTN, age 0-18  CKD (chronic kidney disease)  DM (diabetes mellitus)      PHYSICAL EXAM:  T(C): 36.4  HR: 75   BP: 110/58  RR: 18  SpO2: 96% 5Lnc    General: AAO x 3,  NAD.  HEENT: moist mucous membranes, no pallor/cyanosis.  Neck: no JVD visible.  Cardiac: S1, S2. RRR. No murmurs   Respratory: CTA b/l, no access muscle use.   Abdomen: soft. nontender. nondistended  Skin: no rashes.  Extremities: no LE edema b/l  Access:       DATA: reviewed in detail             dextrose 5%. 1000 milliLiter(s) (50 mL/Hr) IV Continuous <Continuous>  dextrose 50% Injectable 12.5 Gram(s) IV Push once  dextrose 50% Injectable 25 Gram(s) IV Push once  dextrose 50% Injectable 25 Gram(s) IV Push once  doxazosin 8 milliGRAM(s) Oral at bedtime  famotidine    Tablet 20 milliGRAM(s) Oral every 48 hours  heparin  Injectable 5000 Unit(s) SubCutaneous every 8 hours  insulin lispro (HumaLOG) corrective regimen sliding scale   SubCutaneous Before meals and at bedtime  montelukast 10 milliGRAM(s) Oral every 24 hours  polyethylene glycol 3350 17 Gram(s) Oral two times a day  sevelamer carbonate 1600 milliGRAM(s) Oral three times a day with meals    MEDICATIONS  (PRN):  acetaminophen   Tablet .. 650 milliGRAM(s) Oral every 6 hours PRN Temp greater or equal to 38C (100.4F), Mild Pain (1 - 3)  aluminum hydroxide/magnesium hydroxide/simethicone Suspension 30 milliLiter(s) Oral every 6 hours PRN Dyspepsia  dextrose 40% Gel 15 Gram(s) Oral once PRN Blood Glucose LESS THAN 70 milliGRAM(s)/deciliter  glucagon  Injectable 1 milliGRAM(s) IntraMuscular once PRN Glucose LESS THAN 70 milligrams/deciliter  simethicone 80 milliGRAM(s) Chew three times a day PRN Gas

## 2020-04-20 NOTE — DISCHARGE NOTE PROVIDER - NSDCFUSCHEDAPPT_GEN_ALL_CORE_FT
JOSHUA ALBRIGHT ; 05/11/2020 ; [facility] [address] JOSHUA ALBRIGHT ; 05/11/2020 ; [facility] [address] JOSHUA ALBRIGHT ; 05/11/2020 ; [facility] [address] JOSHUA ALBRIGHT ; 05/11/2020 ; [facility] [address] JOSHUA ALBRIGHT ; 05/11/2020 ; [facility] [address] JOSHUA ALBRIGHT ; 05/11/2020 ; [facility] [address] JOSHUA ALBRIGHT ; 05/11/2020 ; [facility] [address] JOSHUA ALBRIGHT ; 05/11/2020 ; [facility] [address] JOSHUA ALBRIGHT ; 05/11/2020 ; [facility] [address]

## 2020-04-20 NOTE — PROGRESS NOTE ADULT - PROBLEM SELECTOR PLAN 8
DVT ppx: HSQ   GI ppx: pepcid   Code- Full Code  DIspo: PT consult called, will talk to social work, pending d/c to nursing home Monday

## 2020-04-20 NOTE — DISCHARGE NOTE PROVIDER - NSDCHHNEEDSERVICE_GEN_ALL_CORE
Observation and assessment/Medication teaching and assessment/Rehabilitation services/Teaching and training

## 2020-04-20 NOTE — PROGRESS NOTE ADULT - SUBJECTIVE AND OBJECTIVE BOX
OVERNIGHT EVENTS:    SUBJECTIVE / INTERVAL HPI: Patient seen and examined at bedside. No acute events overnight. Cough improved, denies SOB and CP. Had a BM yesterday, eating and drinking well.     VITAL SIGNS:  Vital Signs Last 24 Hrs  T(C): 37 (20 Apr 2020 06:06), Max: 37.4 (19 Apr 2020 20:23)  T(F): 98.6 (20 Apr 2020 06:06), Max: 99.4 (19 Apr 2020 20:23)  HR: 74 (20 Apr 2020 06:06) (74 - 83)  BP: 117/67 (20 Apr 2020 06:06) (106/57 - 119/73)  BP(mean): --  RR: 18 (20 Apr 2020 06:06) (18 - 20)  SpO2: 92% (20 Apr 2020 06:06) (88% - 95%)    PHYSICAL EXAM:    General: WDWN, sitting comfortably in bed   HEENT: NC/AT; PERRL, anicteric sclera; MMM  Neck: supple  Cardiovascular: +S1/S2, RRR  Respiratory: Bibasilar crackles, diffuse wheezes   Gastrointestinal: soft, NT/ND; +BSx4  Extremities: WWP; no edema, clubbing or cyanosis  Vascular: 2+ radial, DP/PT pulses B/L  Neurological: AAOx3; no focal deficits; CN II-XII grossly intact     MEDICATIONS:  MEDICATIONS  (STANDING):  allopurinol 100 milliGRAM(s) Oral daily  aspirin enteric coated 81 milliGRAM(s) Oral daily  atorvastatin 20 milliGRAM(s) Oral at bedtime  dextrose 5%. 1000 milliLiter(s) (50 mL/Hr) IV Continuous <Continuous>  dextrose 50% Injectable 12.5 Gram(s) IV Push once  dextrose 50% Injectable 25 Gram(s) IV Push once  dextrose 50% Injectable 25 Gram(s) IV Push once  doxazosin 8 milliGRAM(s) Oral at bedtime  famotidine    Tablet 20 milliGRAM(s) Oral every 48 hours  heparin  Injectable 5000 Unit(s) SubCutaneous every 8 hours  insulin lispro (HumaLOG) corrective regimen sliding scale   SubCutaneous Before meals and at bedtime  montelukast 10 milliGRAM(s) Oral every 24 hours  polyethylene glycol 3350 17 Gram(s) Oral two times a day  sevelamer carbonate 1600 milliGRAM(s) Oral three times a day with meals    MEDICATIONS  (PRN):  acetaminophen   Tablet .. 650 milliGRAM(s) Oral every 6 hours PRN Temp greater or equal to 38C (100.4F), Mild Pain (1 - 3)  aluminum hydroxide/magnesium hydroxide/simethicone Suspension 30 milliLiter(s) Oral every 6 hours PRN Dyspepsia  dextrose 40% Gel 15 Gram(s) Oral once PRN Blood Glucose LESS THAN 70 milliGRAM(s)/deciliter  glucagon  Injectable 1 milliGRAM(s) IntraMuscular once PRN Glucose LESS THAN 70 milligrams/deciliter  simethicone 80 milliGRAM(s) Chew three times a day PRN Gas      ALLERGIES:  Allergies    penicillin (Unknown)  sulfa drugs (Unknown)    Intolerances    LABS:                        8.7    6.58  )-----------( 236      ( 20 Apr 2020 06:23 )             27.4     04-20    136  |  97  |  43<H>  ----------------------------<  134<H>  4.3   |  27  |  5.92<H>    Ca    9.9      20 Apr 2020 06:23  Phos  4.6     04-20  Mg     2.9     04-20    TPro  5.5<L>  /  Alb  2.9<L>  /  TBili  0.3  /  DBili  x   /  AST  28  /  ALT  42  /  AlkPhos  91  04-20    Ferritin, Serum (04.20.20 @ 06:23)    Ferritin, Serum: 1316 ng/mL    D-Dimer Assay, Quantitative (04.20.20 @ 06:23)    D-Dimer Assay, Quantitative: 225 ng/mL DDU    C-Reactive Protein, Serum (04.20.20 @ 06:23)    C-Reactive Protein, Serum: 0.33 mg/dL    CAPILLARY BLOOD GLUCOSE    POCT Blood Glucose.: 141 mg/dL (20 Apr 2020 07:57)      RADIOLOGY & ADDITIONAL TESTS: Reviewed.

## 2020-04-20 NOTE — PROGRESS NOTE ADULT - ASSESSMENT
84 year old M with PMH of IDDM, HTN, HLD,ESRD on hemodialysis,  sleep apnea (has CPAP machine), gout, Paget's s/p Reclast in 6/2014. 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 and verrucous acanthoma  COVID + - presented with cough, general malaise, and fatigue    ESRD on HD - last HD saturday, dialyze again today, increasing O2 requirements - goal 1L UF with albumin prn for hypotension. AOCD, secondary hyperparathyroidism, hyperphosphatemia. 84 year old M with PMH of IDDM, HTN, HLD, sleep apnea (has CPAP machine), gout, Paget's s/p Reclast in 6/2014. 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 and verrucous acanthoma  COVID + with ATN on CKD V likely progressed to ESRD now dialysis dependent    ESRD on HD - last HD saturday, dialyze again today, increasing O2 requirements - goal 1L UF with albumin prn for hypotension. AOCD, secondary hyperparathyroidism, hyperphosphatemia.   Needs swk set up for outpt [facility] w dialysis capabilities for discharge, will need PPD if not placed already. Access: tunneled dialysis catheter IJ

## 2020-04-20 NOTE — PROGRESS NOTE ADULT - PROBLEM SELECTOR PLAN 6
Pt is on allopurinol 100 bid and PRN colchicine at home   - redose allopurinol 100 mg to once a day  - asymptomatic

## 2020-04-20 NOTE — DISCHARGE NOTE PROVIDER - NSDCCPCAREPLAN_GEN_ALL_CORE_FT
PRINCIPAL DISCHARGE DIAGNOSIS  Diagnosis: Pneumonia due to COVID-19 virus  Assessment and Plan of Treatment: Based on your history, clinical presentation, imaging studies, and labwork (including a [id number]-glcvvtgz nasal swab), you were diagnosed with COVID-19 pneumonia. You were initiated on the appropriate treatment regimen, including azithromycin and hydroxychloroquine. You completed a full course of this treatment regimen. Further treatment for this infection is supportive care, which includes rest, maintaining a healthy diet with sufficient hydration, and taking acetaminophen (Tylenol) for fever and muscle aches as needed. Please maintain a strict home quarantine for 14 days at home, and wear a surgical mask at all times when you need to be in close proximity with another human being. Thorough instructions for self-quarantine have been provided. You may take Tylenol every 6 hours as needed, with a maximum daily dose of 4,000 mg a day. Please visit your nearest urgent care or emergency department should you start to experience: severe shortness of breath, severe cough/wheezing/difficulty breathing, or fever >103 for 3 days. If you have any questions, please call your primary care provider.        SECONDARY DISCHARGE DIAGNOSES  Diagnosis: End-stage renal disease  Assessment and Plan of Treatment: End-stage kidney disease occurs when the kidneys are so damaged that they cannot function and cannot get better. The kidneys are two organs that do many important jobs in the body, including:   Removing wastes and extra fluids from the blood. Making hormones that maintain the amount of fluid in your tissues and blood vessels.   Maintaining the right amount of fluids and chemicals in the body.  Without functioning kidneys, toxins build up in the blood and life-threatening complications can occur.  Symptoms of this condition include:   Swelling (edema) of the face, legs, ankles, or feet. Numbness, tingling, or loss of feeling in the extremitites, lethargy, loss of consciousness, confusion, chest pain, shortness of breath, decreased urine, muscle cramps, itchy skin, loss of appetite, anemia, headaches,   Muscle twitches and cramps, especially in the legs. headaches, muscle wasting, bruising, seizures  This condition may be treated with: A procedure that removes toxic wastes from the body (dialysis).  Dialysis that is done by a machine (hemodialysis). This may be done several times a week. You may need to take medicines to control high blood pressure, cholesterol and diabetes. You may need a meal plan to control salt, protein, sodium, phosphorous, potassium and calcium.   Take over-the-counter and prescription medicines only as told by your health care provider.   Do not take any new medicines, vitamins, or mineral supplements unless approved by your health care provider. Many medicines and supplements can worsen kidney damage.   Lifestyle   Do not use any products that contain nicotine or tobacco. Achieve and maintain a healthy weight. Start or continue an exercise plan. Exercise at least 30 minutes a day, 5 days a week.  Follow your prescribed meal plan.  Contact a health care provider if:  Your symptoms get worse, you develop new symptoms.  Get help right away if:  You have weakness in an arm or leg on one side of your body, difficulty speaking, change in vision, severe heada PRINCIPAL DISCHARGE DIAGNOSIS  Diagnosis: Pneumonia due to COVID-19 virus  Assessment and Plan of Treatment: Based on your history, clinical presentation, imaging studies, and labwork (including a [id number]-lcceqgna nasal swab), you were diagnosed with COVID-19 pneumonia. You were initiated on the appropriate treatment regimen, including azithromycin and hydroxychloroquine. You completed a full course of this treatment regimen. Further treatment for this infection is supportive care, which includes rest, maintaining a healthy diet with sufficient hydration, and taking acetaminophen (Tylenol) for fever and muscle aches as needed. Please maintain a strict home quarantine for 14 days at home, and wear a surgical mask at all times when you need to be in close proximity with another human being. Thorough instructions for self-quarantine have been provided. You may take Tylenol every 6 hours as needed, with a maximum daily dose of 4,000 mg a day. Please visit your nearest urgent care or emergency department should you start to experience: severe shortness of breath, severe cough/wheezing/difficulty breathing, or fever >103 for 3 days. If you have any questions, please call your primary care provider.        SECONDARY DISCHARGE DIAGNOSES  Diagnosis: Gout  Assessment and Plan of Treatment: Summary  Gout is painful swelling of the joints caused by inflammation.The most common site of pain is the big toe, but it can affect other joints in the body.Medicines and dietary changes can help to prevent and treat gout attacks.  General instructions   Take over-the-counter and prescription medicines only as told by your health care provider.Do not drive or use heavy machinery while taking prescription pain medicine.Return to your normal activities as told by your health care provider. Ask your health care provider what activities are safe for you.Keep all follow-up visits as told by your health care provider. This is important.  Contact a health care provider if you have:  Another gout attack.Continuing symptoms of a gout attack after 10 days of treatment.Side effects from your medicines.Chills or a fever.Burning pain when you urinate.Pain in your lower back or belly.  Get help right away if you:  Have severe or uncontrolled pain.Cannot urinate  - Continue with allopurinol 100mg at home. Take this medication on alternate days after a session of dialysis   - For acute flare, take one dose of colchicine .6mg. Do not repeat this sooner than 14 days after a dosage    Diagnosis: H/O insulin dependent diabetes mellitus  Assessment and Plan of Treatment: You have a diagnosis of Diabetes Mellitus. This is a disorder that disrupts the way your body uses sugar. All the cells in your body need sugar to work normally. Sugar gets into the cells with the help of a hormone called insulin. If there is not enough insulin, or if the body stops responding to insulin, sugar builds up in the blood. That is what happens to people with diabetes. Type 2 diabetes usually causes no symptoms. When symptoms do occur, they include the need to urinate often, intense thirst and blurry vision. Even though type 2 diabetes might not make you feel sick, it can cause serious problems over time, if it is not treated. The disorder can lead to heart attacks, strokes, kidney disease, vision problems (or even blindness), pain or loss of feeling in the hands and feet, and the need to have fingers, toes, or other body parts removed (amputated). In addition to maintaining an active lifestyle, losing weight, eating right, and not smoking it is important to take your diabetes medications as directed to control your blood sugar and prevent the possible complications from this disease.  -Continue on home insulin regimen       Diagnosis: Hypertension  Assessment and Plan of Treatment: Hypertension is the medical term for high blood pressure. Blood pressure refers to the pressure that blood applies to the inner walls of the arteries. Arteries carry blood from the heart to other organs and parts of the body. Untreated high blood pressure increases the strain on the heart and arteries, eventually causing organ damage. High blood pressure increases the risk of heart failure, heart attack (myocardial infarction), stroke, and kidney failure. High blood pressure does not usually cause any symptoms. Treatment of hypertension usually begins with lifestyle changes. Making these lifestyle changes involves little or no risk. Recommended changes often include reducing the amount of salt in your diet, losing weight if you are overweight or obese, avoiding drinking too much alcohol, stopping smoking and exercising at least 30 minutes per day most days of the week. If you are prescribed medication for your hypertension it is important to take these as prescribed to prevent the possible complications of uncontrolled hypertension.  - Continue with doxazosin 8mg daily  - Continue with lasix 20mg two times daily  - Continue with norvasc 10 mg daily       Diagnosis: End-stage renal disease  Assessment and Plan of Treatment: Summary  End-stage kidney disease occurs when the kidneys are so damaged that they cannot function and cannot get better.Without functioning kidneys, toxins build up in the blood and life-threatening complications can occur.Treatment may include dialysis or a kidney transplant along with medicines and lifestyle changes.  Follow these instructions at home:  Take over-the-counter and prescription medicines only as told by your health care provider.Do not take any new medicines, vitamins, or mineral supplements unless approved by your health care provider. Many medicines and supplements can worsen kidney damage.Follow instructions from your health care provider about adjusting the doses of any medicines you take.  Lifestyle   Do not use any products that contain nicotine or tobacco, such as cigarettes and e-cigarettes. If you need help quitting, ask your health care provider.Achieve and maintain a healthy weight. If you need help with this, ask your health care provider.Start or continue an exercise plan. Exercise at least 30 minutes a day, 5 days a week.Follow your prescribed meal plan.  General instructions   Stay current with your immunizations as told by your health care provider.Keep track of your blood pressure. Report changes in your blood pressure as told by your health care provider.If you are being treated for diabetes, monitor and track your blood sugar levels as told by your health care provider.Keep all follow-up visits as told by your health care provider. This is important  Contact a health care provider if:  Your symptoms get worse.You develop new symptoms.  Get help right away if:  You have weakness in an arm or leg on one side of your body.You have difficulty speaking or you are slurring your speech.You have a sudden change in your vision.You have a sudden, severe headache.You have a sudden weight increase.You have difficulty breathing.  -Please continue with dialysis at home  -Continue with sevelamer carbonate 1600 mg oral three times daily with meals PRINCIPAL DISCHARGE DIAGNOSIS  Diagnosis: Pneumonia due to COVID-19 virus  Assessment and Plan of Treatment: Based on your history, clinical presentation, imaging studies, and labwork (including a [id number]-sanopvhz nasal swab), you were diagnosed with COVID-19 pneumonia. You were initiated on the appropriate treatment regimen, including azithromycin and hydroxychloroquine. You completed a full course of this treatment regimen. Further treatment for this infection is supportive care, which includes rest, maintaining a healthy diet with sufficient hydration, and taking acetaminophen (Tylenol) for fever and muscle aches as needed. Please maintain a strict home quarantine for 14 days at home, and wear a surgical mask at all times when you need to be in close proximity with another human being. Thorough instructions for self-quarantine have been provided. You may take Tylenol every 6 hours as needed, with a maximum daily dose of 4,000 mg a day. Please visit your nearest urgent care or emergency department should you start to experience: severe shortness of breath, severe cough/wheezing/difficulty breathing, or fever >103 for 3 days. If you have any questions, please call your primary care provider.        SECONDARY DISCHARGE DIAGNOSES  Diagnosis: Hyperlipidemia  Assessment and Plan of Treatment: You have a known history of high cholesterol prior to your admission. To manage this you are on a medication called ____statin. High cholesterol is bad because it can cause damage to your heart and puts you at risk for heart attack and stroke.  It is important that you continue to take this medication when you are discharged so that you can continue to control your level of cholesterol. Additionally be sure to follow up with your primary care physician on a regular basis to make sure your triglyceride and cholesterol levels continue to be well controlled.  Contact a health care provider if:  You are struggling to maintain a healthy diet or weight.You need help to start on an exercise program.You need help to stop smoking.  Get help right away if:  You have chest pain.You have trouble breathing.  -Please take rosuvastatin 5mg daily. Please note that the dosage of this medication has been re-dosed because of your kidney disease.       Diagnosis: Gout  Assessment and Plan of Treatment: Summary  Gout is painful swelling of the joints caused by inflammation.The most common site of pain is the big toe, but it can affect other joints in the body.Medicines and dietary changes can help to prevent and treat gout attacks.  General instructions   Take over-the-counter and prescription medicines only as told by your health care provider.Do not drive or use heavy machinery while taking prescription pain medicine.Return to your normal activities as told by your health care provider. Ask your health care provider what activities are safe for you.Keep all follow-up visits as told by your health care provider. This is important.  Contact a health care provider if you have:  Another gout attack.Continuing symptoms of a gout attack after 10 days of treatment.Side effects from your medicines.Chills or a fever.Burning pain when you urinate.Pain in your lower back or belly.  Get help right away if you:  Have severe or uncontrolled pain.Cannot urinate  - Continue with allopurinol 100mg at home. Take this medication on alternate days after a session of dialysis   - For acute flare, take one dose of colchicine .6mg. Do not repeat this sooner than 14 days after a dosage    Diagnosis: H/O insulin dependent diabetes mellitus  Assessment and Plan of Treatment: You have a diagnosis of Diabetes Mellitus. This is a disorder that disrupts the way your body uses sugar. All the cells in your body need sugar to work normally. Sugar gets into the cells with the help of a hormone called insulin. If there is not enough insulin, or if the body stops responding to insulin, sugar builds up in the blood. That is what happens to people with diabetes. Type 2 diabetes usually causes no symptoms. When symptoms do occur, they include the need to urinate often, intense thirst and blurry vision. Even though type 2 diabetes might not make you feel sick, it can cause serious problems over time, if it is not treated. The disorder can lead to heart attacks, strokes, kidney disease, vision problems (or even blindness), pain or loss of feeling in the hands and feet, and the need to have fingers, toes, or other body parts removed (amputated). In addition to maintaining an active lifestyle, losing weight, eating right, and not smoking it is important to take your diabetes medications as directed to control your blood sugar and prevent the possible complications from this disease.  -Continue on home insulin regimen       Diagnosis: Hypertension  Assessment and Plan of Treatment: Hypertension is the medical term for high blood pressure. Blood pressure refers to the pressure that blood applies to the inner walls of the arteries. Arteries carry blood from the heart to other organs and parts of the body. Untreated high blood pressure increases the strain on the heart and arteries, eventually causing organ damage. High blood pressure increases the risk of heart failure, heart attack (myocardial infarction), stroke, and kidney failure. High blood pressure does not usually cause any symptoms. Treatment of hypertension usually begins with lifestyle changes. Making these lifestyle changes involves little or no risk. Recommended changes often include reducing the amount of salt in your diet, losing weight if you are overweight or obese, avoiding drinking too much alcohol, stopping smoking and exercising at least 30 minutes per day most days of the week. If you are prescribed medication for your hypertension it is important to take these as prescribed to prevent the possible complications of uncontrolled hypertension.  - Continue with doxazosin 8mg daily  - Continue with lasix 20mg two times daily  - Continue with norvasc 10 mg daily       Diagnosis: End-stage renal disease  Assessment and Plan of Treatment: Summary  End-stage kidney disease occurs when the kidneys are so damaged that they cannot function and cannot get better.Without functioning kidneys, toxins build up in the blood and life-threatening complications can occur.Treatment may include dialysis or a kidney transplant along with medicines and lifestyle changes.  Follow these instructions at home:  Take over-the-counter and prescription medicines only as told by your health care provider.Do not take any new medicines, vitamins, or mineral supplements unless approved by your health care provider. Many medicines and supplements can worsen kidney damage.Follow instructions from your health care provider about adjusting the doses of any medicines you take.  Lifestyle   Do not use any products that contain nicotine or tobacco, such as cigarettes and e-cigarettes. If you need help quitting, ask your health care provider.Achieve and maintain a healthy weight. If you need help with this, ask your health care provider.Start or continue an exercise plan. Exercise at least 30 minutes a day, 5 days a week.Follow your prescribed meal plan.  General instructions   Stay current with your immunizations as told by your health care provider.Keep track of your blood pressure. Report changes in your blood pressure as told by your health care provider.If you are being treated for diabetes, monitor and track your blood sugar levels as told by your health care provider.Keep all follow-up visits as told by your health care provider. This is important  Contact a health care provider if:  Your symptoms get worse.You develop new symptoms.  Get help right away if:  You have weakness in an arm or leg on one side of your body.You have difficulty speaking or you are slurring your speech.You have a sudden change in your vision.You have a sudden, severe headache.You have a sudden weight increase.You have difficulty breathing.  -Please continue with dialysis at home  -Continue with sevelamer carbonate 1600 mg oral three times daily with meals PRINCIPAL DISCHARGE DIAGNOSIS  Diagnosis: Pneumonia due to COVID-19 virus  Assessment and Plan of Treatment: Based on your history, clinical presentation, imaging studies, and labwork (including a [id number]-xlvfkuvx nasal swab), you were diagnosed with COVID-19 pneumonia. You were initiated on the appropriate treatment regimen, including azithromycin and hydroxychloroquine. You completed a full course of this treatment regimen. Further treatment for this infection is supportive care, which includes rest, maintaining a healthy diet with sufficient hydration, and taking acetaminophen (Tylenol) for fever and muscle aches as needed. Please maintain a strict home quarantine for 14 days at home, and wear a surgical mask at all times when you need to be in close proximity with another human being. Thorough instructions for self-quarantine have been provided. You may take Tylenol every 6 hours as needed, with a maximum daily dose of 4,000 mg a day. Please visit your nearest urgent care or emergency department should you start to experience: severe shortness of breath, severe cough/wheezing/difficulty breathing, or fever >103 for 3 days. If you have any questions, please call your primary care provider.        SECONDARY DISCHARGE DIAGNOSES  Diagnosis: Hyperlipidemia  Assessment and Plan of Treatment: You have a known history of high cholesterol prior to your admission. To manage this you are on a medication called ____statin. High cholesterol is bad because it can cause damage to your heart and puts you at risk for heart attack and stroke.  It is important that you continue to take this medication when you are discharged so that you can continue to control your level of cholesterol. Additionally be sure to follow up with your primary care physician on a regular basis to make sure your triglyceride and cholesterol levels continue to be well controlled.  Contact a health care provider if:  You are struggling to maintain a healthy diet or weight.You need help to start on an exercise program.You need help to stop smoking.  Get help right away if:  You have chest pain.You have trouble breathing.  -Please take rosuvastatin 5mg daily. Please note that the dosage of this medication has been re-dosed because of your kidney disease.       Diagnosis: Gout  Assessment and Plan of Treatment: Summary  Gout is painful swelling of the joints caused by inflammation.The most common site of pain is the big toe, but it can affect other joints in the body.Medicines and dietary changes can help to prevent and treat gout attacks.  General instructions   Take over-the-counter and prescription medicines only as told by your health care provider.Do not drive or use heavy machinery while taking prescription pain medicine.Return to your normal activities as told by your health care provider. Ask your health care provider what activities are safe for you.Keep all follow-up visits as told by your health care provider. This is important.  Contact a health care provider if you have:  Another gout attack.Continuing symptoms of a gout attack after 10 days of treatment.Side effects from your medicines.Chills or a fever.Burning pain when you urinate.Pain in your lower back or belly.  Get help right away if you:  Have severe or uncontrolled pain.Cannot urinate  - Continue with allopurinol 100mg at home. Take this medication on alternate days after a session of dialysis   - For acute flare, take one dose of colchicine .6mg. Do not repeat this sooner than 14 days after a dosage    Diagnosis: H/O insulin dependent diabetes mellitus  Assessment and Plan of Treatment: You have a diagnosis of Diabetes Mellitus. This is a disorder that disrupts the way your body uses sugar. All the cells in your body need sugar to work normally. Sugar gets into the cells with the help of a hormone called insulin. If there is not enough insulin, or if the body stops responding to insulin, sugar builds up in the blood. That is what happens to people with diabetes. Type 2 diabetes usually causes no symptoms. When symptoms do occur, they include the need to urinate often, intense thirst and blurry vision. Even though type 2 diabetes might not make you feel sick, it can cause serious problems over time, if it is not treated. The disorder can lead to heart attacks, strokes, kidney disease, vision problems (or even blindness), pain or loss of feeling in the hands and feet, and the need to have fingers, toes, or other body parts removed (amputated). In addition to maintaining an active lifestyle, losing weight, eating right, and not smoking it is important to take your diabetes medications as directed to control your blood sugar and prevent the possible complications from this disease.  -Continue on home insulin regimen       Diagnosis: Hypertension  Assessment and Plan of Treatment: Hypertension is the medical term for high blood pressure. Blood pressure refers to the pressure that blood applies to the inner walls of the arteries. Arteries carry blood from the heart to other organs and parts of the body. Untreated high blood pressure increases the strain on the heart and arteries, eventually causing organ damage. High blood pressure increases the risk of heart failure, heart attack (myocardial infarction), stroke, and kidney failure. High blood pressure does not usually cause any symptoms. Treatment of hypertension usually begins with lifestyle changes. Making these lifestyle changes involves little or no risk. Recommended changes often include reducing the amount of salt in your diet, losing weight if you are overweight or obese, avoiding drinking too much alcohol, stopping smoking and exercising at least 30 minutes per day most days of the week. If you are prescribed medication for your hypertension it is important to take these as prescribed to prevent the possible complications of uncontrolled hypertension.  - Continue with doxazosin 8mg daily  - Continue with lasix 20mg two times daily  - Continue with norvasc 10 mg daily       Diagnosis: End-stage renal disease  Assessment and Plan of Treatment: Summary  End-stage kidney disease occurs when the kidneys are so damaged that they cannot function and cannot get better.Without functioning kidneys, toxins build up in the blood and life-threatening complications can occur.Treatment may include dialysis or a kidney transplant along with medicines and lifestyle changes.  Follow these instructions at home:  Take over-the-counter and prescription medicines only as told by your health care provider.Do not take any new medicines, vitamins, or mineral supplements unless approved by your health care provider. Many medicines and supplements can worsen kidney damage.Follow instructions from your health care provider about adjusting the doses of any medicines you take.  Lifestyle   Do not use any products that contain nicotine or tobacco, such as cigarettes and e-cigarettes. If you need help quitting, ask your health care provider.Achieve and maintain a healthy weight. If you need help with this, ask your health care provider.Start or continue an exercise plan. Exercise at least 30 minutes a day, 5 days a week.Follow your prescribed meal plan.  General instructions   Stay current with your immunizations as told by your health care provider.Keep track of your blood pressure. Report changes in your blood pressure as told by your health care provider.If you are being treated for diabetes, monitor and track your blood sugar levels as told by your health care provider.Keep all follow-up visits as told by your health care provider. This is important  Contact a health care provider if:  Your symptoms get worse.You develop new symptoms.  Get help right away if:  You have weakness in an arm or leg on one side of your body.You have difficulty speaking or you are slurring your speech.You have a sudden change in your vision.You have a sudden, severe headache.You have a sudden weight increase.You have difficulty breathing.  -Please continue with dialysis at home  -Continue with sevelamer carbonate 1600 mg oral three times daily with meals

## 2020-04-20 NOTE — CHART NOTE - NSCHARTNOTEFT_GEN_A_CORE
Mr. Carver will require a wheelchair for home use due to his diagnosis of end stage renal disease. The patient has mobility limitations that can't be sufficiently resolved by the use of an appropriately fitted cane or walker. without the wheelchair the patient will not be able to participate in MRADLs. He has expressed a willingness to use the wheelchair in his home The beneficiary has a caregiver/family member who is willing and able to provide assistance with the wheelchair.

## 2020-04-20 NOTE — DISCHARGE NOTE PROVIDER - CARE PROVIDER_API CALL
eHidi Valentin (MD)  Internal Medicine; Nephrology  [address], [address]  [place], [place] [address]  Phone: [phone]  Fax: [phone]  Follow Up Time:

## 2020-04-20 NOTE — PROGRESS NOTE ADULT - ASSESSMENT
84 year old M with PMH of IDDM, HTN, HLD, CKD stage 4, sleep apnea (has CPAP machine), gout, Paget's, melanoma s/p Moh's who presented from his nephrologist office (Dr. Dover) because of fatigue, poor appetite and malaise w/ apparent increased creatinine in past week w/ productive cough. Found to be COVID+ and undergoing dialysis.

## 2020-04-20 NOTE — DISCHARGE NOTE PROVIDER - NSDCMRMEDTOKEN_GEN_ALL_CORE_FT
Semi Electric Bed EMETERIO:99: Ht:5&#x27; 6&quot;  Wt: 181 lbs Oxygen : Diagnosis: Hypoxic Respiratory Failure  Needs: 3L Nasal Canula  Duration: 99 Months  Semi Electric Bed EMETERIO:99: Ht:5&#x27; 6&quot;  Wt: 181 lbs acetaminophen 325 mg oral tablet: 2 tab(s) orally every 6 hours, As needed, Temp greater or equal to 38C (100.4F), Mild Pain (1 - 3)  allopurinol 100 mg oral tablet: 1 tab(s) orally every other day, post-dialysis session   aluminum hydroxide-magnesium hydroxide 200 mg-200 mg/5 mL oral suspension: 30 milliliter(s) orally every 6 hours, As needed, Dyspepsia  atorvastatin 20 mg oral tablet: 1 tab(s) orally once a day (at bedtime)  colchicine 0.6 mg oral tablet: 0.6 milligram(s) orally , As Needed for acute gout flare. Do not repeat within 14 days  doxazosin 8 mg oral tablet: 1 tab(s) orally once a day (at bedtime)  Lasix 20 mg oral tablet: orally 2 times a day  Norvasc 10 mg oral tablet: 1 tab(s) orally once a day  sevelamer carbonate 800 mg oral tablet: 2 tab(s) orally 3 times a day (with meals)  simethicone 80 mg oral tablet, chewable: 1 tab(s) orally 3 times a day, As needed, Gas acetaminophen 325 mg oral tablet: 2 tab(s) orally every 6 hours, As needed, Temp greater or equal to 38C (100.4F), Mild Pain (1 - 3)  allopurinol 100 mg oral tablet: 1 tab(s) orally every other day, post-dialysis session   aluminum hydroxide-magnesium hydroxide 200 mg-200 mg/5 mL oral suspension: 30 milliliter(s) orally every 6 hours, As needed, Dyspepsia  aspirin 81 mg oral delayed release tablet: 1 tab(s) orally once a day  colchicine 0.6 mg oral tablet: 0.6 milligram(s) orally , As Needed for acute gout flare. Do not repeat within 14 days  doxazosin 8 mg oral tablet: 1 tab(s) orally once a day (at bedtime)  Lantus 100 units/mL subcutaneous solution: 24 unit(s) subcutaneous once a day  Lasix 20 mg oral tablet: orally 2 times a day  montelukast 10 mg oral tablet: 1 tab(s) orally every 24 hours  Norvasc 10 mg oral tablet: 1 tab(s) orally once a day  Ozempic (1 mg dose) 2 mg/1.5 mL subcutaneous solution: 1 milligram(s) subcutaneous once a week  repaglinide 0.5 mg oral tablet: 1 tab(s) orally 3 times a day (before meals)  rosuvastatin 5 mg oral tablet: 1 tab(s) orally once a day  sevelamer carbonate 800 mg oral tablet: 2 tab(s) orally 3 times a day (with meals)  simethicone 80 mg oral tablet, chewable: 1 tab(s) orally 3 times a day, As needed, Gas allopurinol 100 mg oral tablet: 1 tab(s) orally every other day, post-dialysis session   aspirin 81 mg oral delayed release tablet: 1 tab(s) orally once a day  colchicine 0.6 mg oral tablet: 0.6 milligram(s) orally once a day, As Needed  for acute gout flare. Do not repeat within 14 days  doxazosin 8 mg oral tablet: 1 tab(s) orally once a day (at bedtime)  Lantus 100 units/mL subcutaneous solution: 24 unit(s) subcutaneous once a day  Lasix 20 mg oral tablet: 1 tab(s) orally 2 times a day   montelukast 10 mg oral tablet: 1 tab(s) orally every 24 hours  Norvasc 10 mg oral tablet: 1 tab(s) orally once a day  Ozempic (1 mg dose) 2 mg/1.5 mL subcutaneous solution: 1 milligram(s) subcutaneous once a week  repaglinide 0.5 mg oral tablet: 1 tab(s) orally 3 times a day (before meals)  rosuvastatin 5 mg oral tablet: 1 tab(s) orally once a day  sevelamer carbonate 800 mg oral tablet: 2 tab(s) orally 3 times a day (with meals)  simethicone 80 mg oral tablet, chewable: 1 tab(s) orally 3 times a day, As needed, Gas

## 2020-04-20 NOTE — DISCHARGE NOTE PROVIDER - HOSPITAL COURSE
A 84 years old M with PMH of IDDM, HTN, HLD, CKD stage 4, sleep apnea (has CPAP machine), gout, Paget's s/p Reclast in 6/2014. 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 and verrucous acanthoma, presented from his nephrologist office (Dr. Jaffe) because of fatigue and poor appetite and malaise w/ apparent increased creatinine in past week w/ productive cough. Denied cough, SOB, fever, chills, headache, dizziness, chest pain, nausea, vomiting, diarrhea, abdominal pain, leg pain or swelling. States that his partner has cough for almost 2 weeks, denies recent travel.     In the ED his T was 98.7, HR 84, /69, RR 20 and sat 86% on room air. Pt was put on NRB mask with 15 liter and 100 fio2 and saturation improved to 100%. his WBC was 9.5, Hb 10.6, Plt 255, d-dimer 189, Na 145, K 5, Cl 112, bicarb 17, , Cr 6.73, glucose 206, AST 82, ALT 91, CRP 7.8, Ferritin 1737, , Ck 288, trop 0.09, UA was cloudy with trace LE.     CXR showed Bilateral patchy/hazy opacities with a mid/lower lung zone predominance concerning for COVID. pt received a dose of lasix 80 iv and made 400 cc urine, as well as a dose of IV azithromycin. COVID test was sent.    On the floor, the patient was weaned to 6L NC, SpO2 92% and comfortable. he patient was started on hydroxychloroquine and azithromycin from 4/1-4/6. He underwent an IR guided RIJ line. The following day (4/3) his oxygenation and inflammatory markers worsened. T At that time HD was attempted but the patient wasnt unable to tolerate ultrafiltration due to hypotension. He was subsequently deemed appropriate for tele. Per nephrology, the patient was initiated on retacrit 10,000U weekly and sevelamer 1600 three times daily.  His course was complicated by an external blockage of his HD catheter which resolved.     On 4/9 the patient was placed on venturi mask, and tolerated weaning. He continued dialysis. On 4/12 he was weaned to 6L NC, but required venturi again on 4/13. He received a tunneled catheter for continued dialysis on 4/17, and he ws weaned to nasal cannula. He was weened to 3L NC over the next two days, but desaturated to 88% on 3L NC so was titrated up to 5L, back down to 3L on 4/20.          Problem/Plan - 1:    ·  Problem: Coronavirus infection.  Plan: COVID 19 + (4/1)    - azithromycin 250 for 5 days total, complete 4/6    - plaquenil for 5 days (400 bid for the first day and 200 bid for the rest of the duration), complete 4/6    - Trended inflammatory markers, which subsequently downtrended          Problem/Plan - 2:    ·  Problem: Pneumonia.  Plan: Likely viral pneumonia 2/2 to COVID-19 infection. Chest xray showed bilateral patchy/hazy opacities with a mid/lower lung zone     -COVID treatment as above     -Wean supplemental oxygen as tolerated with SpO2 goal >88    -Monitor for hypoxemia.          Problem/Plan - 3:    ·  Problem: Renal failure.  Plan: Pt with history of chronic kidney disease now presented with , Cr 8.16, (unknown baseline).     - Hemodialysis initiated 4/2/20 via R IJ temporary catheter    - Tunneled catheter 4/17 w/ IR    - Daily CMP    - Started sevelamer 1600 three times daily for elevated phos    - c/w Retacrit qWeek if hgB <10.          Problem/Plan - 4:    ·  Problem: DM (diabetes mellitus).  Plan: Pt with IDDM with lantus 24 units and prandial TID at home. 4/3 finger stick 51, holding lantus 20 hs now    - Moderate dose sliding scale    - A1c 5.7.          Problem/Plan - 5:    ·  Problem: HTN (hypertension).  Plan: Pt on lasix 20 BID, norvasc 10 qd and doxazosin at home.    - Hold norvasc 10 daily    - Hold lasix    - Resumed doxazosin 8 daily.          Problem/Plan - 6:    Problem: HLD (hyperlipidemia). Plan: Pt is on crestor 20 at home, therapeutic interchange    - cont with atorvastatin 20.         Problem/Plan - 7:    ·  Problem: Gout.  Plan: Pt is on allopurinol 100 bid and PRN colchicine at home     - redose allopurinol 100 mg to once a day.          Problem/Plan - 8:    ·  Problem: Nutrition, metabolism, and development symptoms.  Plan: Fluids: none    Electrolytes-replete as needed per nephro    Nutrition - DASH/TLC/carb consistent.          Problem/Plan - 9:    ·  Problem: Prophylactic measure.  Plan: DVT prophylaxis: heparin subcutaneous     GI prophylaxis: pepcid A 84 years old M with PMH of IDDM, HTN, HLD, CKD stage 4, sleep apnea (has CPAP machine), gout, Paget's s/p Reclast in 6/2014. 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 and verrucous acanthoma, presented from his nephrologist office (Dr. Jaffe) because of fatigue and poor appetite and malaise w/ apparent increased creatinine in past week w/ productive cough. Denied cough, SOB, fever, chills, headache, dizziness, chest pain, nausea, vomiting, diarrhea, abdominal pain, leg pain or swelling. States that his partner has cough for almost 2 weeks, denies recent travel.     In the ED his T was 98.7, HR 84, /69, RR 20 and sat 86% on room air. Pt was put on NRB mask with 15 liter and 100 fio2 and saturation improved to 100%. his WBC was 9.5, Hb 10.6, Plt 255, d-dimer 189, Na 145, K 5, Cl 112, bicarb 17, , Cr 6.73, glucose 206, AST 82, ALT 91, CRP 7.8, Ferritin 1737, , Ck 288, trop 0.09, UA was cloudy with trace LE.     CXR showed Bilateral patchy/hazy opacities with a mid/lower lung zone predominance concerning for COVID. pt received a dose of lasix 80 iv and made 400 cc urine, as well as a dose of IV azithromycin. COVID test was sent.    On the floor, the patient was weaned to 6L NC, SpO2 92% and comfortable. he patient was started on hydroxychloroquine and azithromycin from 4/1-4/6. He underwent an IR guided RIJ line. The following day (4/3) his oxygenation and inflammatory markers worsened. T At that time HD was attempted but the patient wasnt unable to tolerate ultrafiltration due to hypotension. He was subsequently deemed appropriate for tele. Per nephrology, the patient was initiated on retacrit 10,000U weekly and sevelamer 1600 three times daily.  His course was complicated by an external blockage of his HD catheter which resolved.     On 4/9 the patient was placed on venturi mask, and tolerated weaning. He continued dialysis. On 4/12 he was weaned to 6L NC, but required venturi again on 4/13. He received a tunneled catheter for continued dialysis on 4/17, and he ws weaned to nasal cannula. He was weened to 3L NC over the next two days, but desaturated to 88% on 3L NC so was titrated up to 5L, back down to 3L on 4/20.     Admission inflammatory markers: D-Dimer 189, CRP 7.85, Ferritin 1737    Discharge Inflammatory markers: D-dimer 225, CRP 2.9, Ferritin 1316     Problem/Plan - 1:    ·  Problem: Coronavirus infection.  Plan: COVID 19 + (4/1)    - azithromycin 250 for 5 days total, complete 4/6    - plaquenil for 5 days (400 bid for the first day and 200 bid for the rest of the duration), complete 4/6    - Trended inflammatory markers, which subsequently downtrended          Problem/Plan - 2:    ·  Problem: Pneumonia.  Plan: Likely viral pneumonia 2/2 to COVID-19 infection. Chest xray showed bilateral patchy/hazy opacities with a mid/lower lung zone     -COVID treatment as above     -Wean supplemental oxygen as tolerated with SpO2 goal >88    -Monitor for hypoxemia.          Problem/Plan - 3:    ·  Problem: Renal failure.  Plan: Pt with history of chronic kidney disease now presented with , Cr 8.16, (unknown baseline).     - Hemodialysis initiated 4/2/20 via R IJ temporary catheter    - Tunneled catheter 4/17 w/ IR    - Daily CMP    - Started sevelamer 1600 three times daily for elevated phos    - c/w Retacrit qWeek if hgB <10.          Problem/Plan - 4:    ·  Problem: DM (diabetes mellitus).  Plan: Pt with IDDM with lantus 24 units and prandial TID at home. 4/3 finger stick 51, holding lantus 20 hs now    - Moderate dose sliding scale    - A1c 5.7.          Problem/Plan - 5:    ·  Problem: HTN (hypertension).  Plan: Pt on lasix 20 BID, norvasc 10 qd and doxazosin at home.    - Hold norvasc 10 daily    - Hold lasix    - Resumed doxazosin 8 daily.          Problem/Plan - 6:    Problem: HLD (hyperlipidemia). Plan: Pt is on crestor 20 at home, therapeutic interchange    - cont with atorvastatin 20.         Problem/Plan - 7:    ·  Problem: Gout.  Plan: Pt is on allopurinol 100 bid and PRN colchicine at home     - redose allopurinol 100 mg to once a day.          Problem/Plan - 8:    ·  Problem: Nutrition, metabolism, and development symptoms.  Plan: Fluids: none    Electrolytes-replete as needed per nephro    Nutrition - DASH/TLC/carb consistent.          Problem/Plan - 9:    ·  Problem: Prophylactic measure.  Plan: DVT prophylaxis: heparin subcutaneous     GI prophylaxis: pepcid A 84 years old M with PMH of IDDM, HTN, HLD, CKD stage 4, sleep apnea (has CPAP machine), gout, Paget's s/p Reclast in 6/2014. 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 and verrucous acanthoma, presented from his nephrologist office (Dr. Jaffe) because of fatigue and poor appetite and malaise w/ apparent increased creatinine in past week w/ productive cough. Denied cough, SOB, fever, chills, headache, dizziness, chest pain, nausea, vomiting, diarrhea, abdominal pain, leg pain or swelling. States that his partner has cough for almost 2 weeks, denies recent travel.     In the ED his T was 98.7, HR 84, /69, RR 20 and sat 86% on room air. Pt was put on NRB mask with 15 liter and 100 fio2 and saturation improved to 100%. his WBC was 9.5, Hb 10.6, Plt 255, d-dimer 189, Na 145, K 5, Cl 112, bicarb 17, , Cr 6.73, glucose 206, AST 82, ALT 91, CRP 7.8, Ferritin 1737, , Ck 288, trop 0.09, UA was cloudy with trace LE.     CXR showed Bilateral patchy/hazy opacities with a mid/lower lung zone predominance concerning for COVID. pt received a dose of lasix 80 iv and made 400 cc urine, as well as a dose of IV azithromycin. COVID test was sent.    On the floor, the patient was weaned to 6L NC, SpO2 92% and comfortable. he patient was started on hydroxychloroquine and azithromycin from 4/1-4/6. He underwent an IR guided RIJ line. The following day (4/3) his oxygenation and inflammatory markers worsened. T At that time HD was attempted but the patient wasnt unable to tolerate ultrafiltration due to hypotension. He was subsequently deemed appropriate for tele. Per nephrology, the patient was initiated on retacrit 10,000U weekly and sevelamer 1600 three times daily.  His course was complicated by an external blockage of his HD catheter which resolved.     On 4/9 the patient was placed on venturi mask, and tolerated weaning. He continued dialysis. On 4/12 he was weaned to 6L NC, but required venturi again on 4/13. He received a tunneled catheter for continued dialysis on 4/17, and he ws weaned to nasal cannula. He was weened to 3L NC over the next two days, but desaturated to 88% on 3L NC so was titrated up to 5L, back down to 3L on 4/20.     Admission inflammatory markers: D-Dimer 189, CRP 7.85, Ferritin 1737    Discharge Inflammatory markers: D-dimer 288, CRP .21, Ferritin 1094     Problem/Plan - 1:    ·  Problem: Coronavirus infection.  Plan: COVID 19 + (4/1)    - azithromycin 250 for 5 days total, complete 4/6    - plaquenil for 5 days (400 bid for the first day and 200 bid for the rest of the duration), complete 4/6    - Trended inflammatory markers, which subsequently downtrended          Problem/Plan - 2:    ·  Problem: Pneumonia.  Plan: Likely viral pneumonia 2/2 to COVID-19 infection. Chest xray showed bilateral patchy/hazy opacities with a mid/lower lung zone     -COVID treatment as above     -Wean supplemental oxygen as tolerated with SpO2 goal >88    -Monitor for hypoxemia.          Problem/Plan - 3:    ·  Problem: Renal failure.  Plan: Pt with history of chronic kidney disease now presented with , Cr 8.16, (unknown baseline).     - Hemodialysis initiated 4/2/20 via R IJ temporary catheter    - Tunneled catheter 4/17 w/ IR    - Daily CMP    - Started sevelamer 1600 three times daily for elevated phos    - c/w Retacrit qWeek if hgB <10.          Problem/Plan - 4:    ·  Problem: DM (diabetes mellitus).  Plan: Pt with IDDM with lantus 24 units and prandial TID at home. 4/3 finger stick 51, holding lantus 20 hs now    - Moderate dose sliding scale while inpatient, will restart home medications upon discharge     - A1c 5.7.          Problem/Plan - 5:    ·  Problem: HTN (hypertension).  Plan: Pt on lasix 20 BID, norvasc 10 qd and doxazosin at home.    - Hold norvasc 10 daily    - Hold lasix    - Resumed doxazosin 8 daily.          Problem/Plan - 6:    Problem: HLD (hyperlipidemia). Plan: Pt is on crestor 20 at home, therapeutic interchange    - cont with atorvastatin 20.         Problem/Plan - 7:    ·  Problem: Gout.  Plan: Pt is on allopurinol 100 bid and PRN colchicine at home     - redose allopurinol 100 mg to once a day.          Problem/Plan - 8:    ·  Problem: Nutrition, metabolism, and development symptoms.  Plan: Fluids: none    Electrolytes-replete as needed per nephro    Nutrition - DASH/TLC/carb consistent.          Problem/Plan - 9:    ·  Problem: Prophylactic measure.  Plan: DVT prophylaxis: heparin subcutaneous     GI prophylaxis: pepcid

## 2020-04-20 NOTE — PROGRESS NOTE ADULT - PROBLEM SELECTOR PLAN 2
Pt with hx of CKD now presented with , Cr 8.16, (unknown baseline).   - HD initiated 4/2/20 via R IJ temp catheter  - Tunneled catheter 4/17 w/ IR  - s/p HD today on 4/18  - Daily CMP  - Started sevelamer 1600 TID for elevated phos  - hold lokelma  - c/w Retacrit qWeek if hgB <10

## 2020-04-20 NOTE — PROGRESS NOTE ADULT - PROBLEM SELECTOR PLAN 1
COVID 19 + (4/1)  - CXR showed Bilateral patchy/hazy opacities with a mid/lower lung zone   - s/p azithromycin 250 for 5 days total, complete 4/6  - s/p plaquenil for 5 days (400 bid for the first day and 200 bid for the rest of the duration), complete 4/6  - Daily COVID labs, downtrending   - wean O2 as tolerate  - encourage sitting up  - Satting 94% on 6L NC --> desatted to 88% on 3L NC --> went back up to 94% 5L NC.

## 2020-04-21 DIAGNOSIS — D64.9 ANEMIA, UNSPECIFIED: ICD-10-CM

## 2020-04-21 LAB
GLUCOSE BLDC GLUCOMTR-MCNC: 104 MG/DL — HIGH (ref 70–99)
GLUCOSE BLDC GLUCOMTR-MCNC: 126 MG/DL — HIGH (ref 70–99)
GLUCOSE BLDC GLUCOMTR-MCNC: 128 MG/DL — HIGH (ref 70–99)
GLUCOSE BLDC GLUCOMTR-MCNC: 170 MG/DL — HIGH (ref 70–99)

## 2020-04-21 PROCEDURE — 99232 SBSQ HOSP IP/OBS MODERATE 35: CPT | Mod: CS

## 2020-04-21 PROCEDURE — 99233 SBSQ HOSP IP/OBS HIGH 50: CPT | Mod: CS

## 2020-04-21 RX ADMIN — SEVELAMER CARBONATE 1600 MILLIGRAM(S): 2400 POWDER, FOR SUSPENSION ORAL at 17:29

## 2020-04-21 RX ADMIN — MONTELUKAST 10 MILLIGRAM(S): 4 TABLET, CHEWABLE ORAL at 19:03

## 2020-04-21 RX ADMIN — HEPARIN SODIUM 5000 UNIT(S): 5000 INJECTION INTRAVENOUS; SUBCUTANEOUS at 14:49

## 2020-04-21 RX ADMIN — Medication 8 MILLIGRAM(S): at 22:16

## 2020-04-21 RX ADMIN — Medication 4: at 21:31

## 2020-04-21 RX ADMIN — Medication 30 MILLILITER(S): at 19:03

## 2020-04-21 RX ADMIN — Medication 100 MILLIGRAM(S): at 12:27

## 2020-04-21 RX ADMIN — SEVELAMER CARBONATE 1600 MILLIGRAM(S): 2400 POWDER, FOR SUSPENSION ORAL at 12:27

## 2020-04-21 RX ADMIN — HEPARIN SODIUM 5000 UNIT(S): 5000 INJECTION INTRAVENOUS; SUBCUTANEOUS at 05:39

## 2020-04-21 RX ADMIN — ATORVASTATIN CALCIUM 20 MILLIGRAM(S): 80 TABLET, FILM COATED ORAL at 22:16

## 2020-04-21 RX ADMIN — Medication 81 MILLIGRAM(S): at 12:27

## 2020-04-21 RX ADMIN — POLYETHYLENE GLYCOL 3350 17 GRAM(S): 17 POWDER, FOR SOLUTION ORAL at 17:30

## 2020-04-21 RX ADMIN — HEPARIN SODIUM 5000 UNIT(S): 5000 INJECTION INTRAVENOUS; SUBCUTANEOUS at 22:16

## 2020-04-21 RX ADMIN — Medication 2: at 22:15

## 2020-04-21 RX ADMIN — SEVELAMER CARBONATE 1600 MILLIGRAM(S): 2400 POWDER, FOR SUSPENSION ORAL at 05:39

## 2020-04-21 NOTE — PROGRESS NOTE ADULT - PROBLEM SELECTOR PLAN 3
Pt presented on 4/1 with hgb of 10.6, slow downward trend throughout hospitalization. No melena or BRBPR, HD stable.  - Most likely 2/2 to repeated blood draws in the context of CKD  -c/w Retacrit qWeek as recommended by renal

## 2020-04-21 NOTE — PROGRESS NOTE ADULT - SUBJECTIVE AND OBJECTIVE BOX
Patient is a 84y Male seen and evaluated at bedside.   pt seen and discussed with team  last hd completed on 4/20-->  cc     acetaminophen   Tablet .. 650 every 6 hours PRN  albumin human 25% IVPB 50 every 30 minutes PRN  allopurinol 100 daily  aluminum hydroxide/magnesium hydroxide/simethicone Suspension 30 every 6 hours PRN  aspirin enteric coated 81 daily  atorvastatin 20 at bedtime  dextrose 40% Gel 15 once PRN  dextrose 5%. 1000 <Continuous>  dextrose 50% Injectable 12.5 once  dextrose 50% Injectable 25 once  dextrose 50% Injectable 25 once  doxazosin 8 at bedtime  famotidine    Tablet 20 every 48 hours  glucagon  Injectable 1 once PRN  heparin  Injectable 5000 every 8 hours  insulin lispro (HumaLOG) corrective regimen sliding scale  Before meals and at bedtime  montelukast 10 every 24 hours  polyethylene glycol 3350 17 two times a day  sevelamer carbonate 1600 three times a day with meals  simethicone 80 three times a day PRN      Allergies    penicillin (Unknown)  sulfa drugs (Unknown)    Intolerances        T(C): , Max: 37.6 (04-20-20 @ 16:56)  T(F): , Max: 99.6 (04-20-20 @ 16:56)  HR: 63 (04-21-20 @ 05:55)  BP: 113/57 (04-21-20 @ 05:55)  BP(mean): --  RR: 19 (04-21-20 @ 09:36)  SpO2: 92% (04-21-20 @ 09:36)  Wt(kg): --    04-20 @ 07:01  -  04-21 @ 07:00  --------------------------------------------------------  IN: 0 mL / OUT: 600 mL / NET: -600 mL          Review of Systems:  deferred due to COVId 19      PHYSICAL EXAM:  deferred due to COVid 19              LABS:                        8.1    6.46  )-----------( 223      ( 21 Apr 2020 07:09 )             26.0     04-21    141  |  101  |  30<H>  ----------------------------<  129<H>  4.2   |  29  |  4.50<H>    Ca    9.4      21 Apr 2020 07:09  Phos  3.3     04-21  Mg     2.6     04-21    TPro  5.5<L>  /  Alb  3.3  /  TBili  0.4  /  DBili  x   /  AST  27  /  ALT  38  /  AlkPhos  83  04-21                RADIOLOGY & ADDITIONAL STUDIES:

## 2020-04-21 NOTE — CHART NOTE - NSCHARTNOTEFT_GEN_A_CORE
Patient requires home oxygen:    Saturation at rest without oxygen: 87%  Saturation on ambulation without oxygen: 85%    Saturation with ambulation with oxygen: 94%    Patient requires 3L NC at home.

## 2020-04-21 NOTE — PROGRESS NOTE ADULT - PROBLEM SELECTOR PLAN 1
COVID 19 + (4/1)  - CXR showed Bilateral patchy/hazy opacities with a mid/lower lung zone   - s/p azithromycin 250 for 5 days total, complete 4/6  - s/p plaquenil for 5 days (400 bid for the first day and 200 bid for the rest of the duration), complete 4/6  - Daily COVID labs, downtrending   - wean O2 as tolerated  - encourage sitting up  - Satting 94% on 2L NC

## 2020-04-21 NOTE — PROGRESS NOTE ADULT - ASSESSMENT
84 year old M with PMH of IDDM, HTN, HLD,ESRD on hemodialysis,  sleep apnea (has CPAP machine), gout, Paget's s/p Reclast in 6/2014. 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 and verrucous acanthoma  COVID + - presented with cough, general malaise, and fatigue  NEphrology consult placed for HD initiation    NEW ESRD on HD  HD initiated on 04/2 via R IJ HD CAth   last HD completed on 4/20  electrolytes noted  next HD anticipated on 4/22  renal diet    anemia  transfuse prn HB < 7    renal bone disease  calcium and phos noted  on renvela -- lower to 800 mg po tid with meals

## 2020-04-21 NOTE — PROGRESS NOTE ADULT - PROBLEM SELECTOR PLAN 2
Pt with hx of CKD now presented with , Cr 8.16, (unknown baseline).   - HD initiated 4/2/20 via R IJ temp catheter  - Tunneled catheter 4/17 w/ IR  - s/p HD on 4/20  - Daily CMP  - Started sevelamer 1600 TID for elevated phos  - hold lokelma  - c/w Retacrit qWeek if hgB <10

## 2020-04-21 NOTE — PROGRESS NOTE ADULT - SUBJECTIVE AND OBJECTIVE BOX
Patient is a 84y old  Male who presents with a chief complaint of fatigue (20 Apr 2020 10:50)    SUBJECTIVE / OVERNIGHT EVENTS: Patient seen and examined at bedside. No acute events overnight. SOB and cough continue to improve. Had one BM yesterday, soft. Otherwise eating and drinking well.     MEDICATIONS  (STANDING):  allopurinol 100 milliGRAM(s) Oral daily  aspirin enteric coated 81 milliGRAM(s) Oral daily  atorvastatin 20 milliGRAM(s) Oral at bedtime  dextrose 5%. 1000 milliLiter(s) (50 mL/Hr) IV Continuous <Continuous>  dextrose 50% Injectable 12.5 Gram(s) IV Push once  dextrose 50% Injectable 25 Gram(s) IV Push once  dextrose 50% Injectable 25 Gram(s) IV Push once  doxazosin 8 milliGRAM(s) Oral at bedtime  famotidine    Tablet 20 milliGRAM(s) Oral every 48 hours  heparin  Injectable 5000 Unit(s) SubCutaneous every 8 hours  insulin lispro (HumaLOG) corrective regimen sliding scale   SubCutaneous Before meals and at bedtime  montelukast 10 milliGRAM(s) Oral every 24 hours  polyethylene glycol 3350 17 Gram(s) Oral two times a day  sevelamer carbonate 1600 milliGRAM(s) Oral three times a day with meals    MEDICATIONS  (PRN):  acetaminophen   Tablet .. 650 milliGRAM(s) Oral every 6 hours PRN Temp greater or equal to 38C (100.4F), Mild Pain (1 - 3)  albumin human 25% IVPB 50 milliLiter(s) IV Intermittent every 30 minutes PRN sbp<100  aluminum hydroxide/magnesium hydroxide/simethicone Suspension 30 milliLiter(s) Oral every 6 hours PRN Dyspepsia  dextrose 40% Gel 15 Gram(s) Oral once PRN Blood Glucose LESS THAN 70 milliGRAM(s)/deciliter  glucagon  Injectable 1 milliGRAM(s) IntraMuscular once PRN Glucose LESS THAN 70 milligrams/deciliter  simethicone 80 milliGRAM(s) Chew three times a day PRN Gas      Vital Signs Last 24 Hrs  T(C): 36.4 (21 Apr 2020 05:55), Max: 37.6 (20 Apr 2020 16:56)  T(F): 97.5 (21 Apr 2020 05:55), Max: 99.6 (20 Apr 2020 16:56)  HR: 63 (21 Apr 2020 05:55) (63 - 76)  BP: 113/57 (21 Apr 2020 05:55) (112/58 - 119/61)  BP(mean): --  RR: 20 (21 Apr 2020 05:55) (18 - 20)  SpO2: 92% (21 Apr 2020 05:55) (92% - 97%)    CAPILLARY BLOOD GLUCOSE  POCT Blood Glucose.: 126 mg/dL (21 Apr 2020 08:12)  POCT Blood Glucose.: 140 mg/dL (20 Apr 2020 21:10)  POCT Blood Glucose.: 133 mg/dL (20 Apr 2020 16:52)  POCT Blood Glucose.: 151 mg/dL (20 Apr 2020 12:12)    I&O's Summary    20 Apr 2020 07:01  -  21 Apr 2020 07:00  --------------------------------------------------------  IN: 0 mL / OUT: 600 mL / NET: -600 mL    PHYSICAL EXAM:  GENERAL: NAD, well-groomed, well-developed  HEAD:  Atraumatic, Normocephalic  EYES: EOMI, PERRLA, conjunctiva and sclera clear  ENMT: Moist mucous membranes, No lesions  NECK: Supple, No JVD  CHEST/LUNG: Bibasilar crackles appreciated, no evidence of respiratory distress, speaking in complete sentences   HEART: Regular rate and rhythm; Normal S1S2; No murmurs, rubs, or gallops  ABDOMEN: Soft, Nontender, Nondistended; Bowel sounds present; No masses or HSM   EXTREMITIES:  2+ Peripheral Pulses, No clubbing, cyanosis, or edema  NERVOUS SYSTEM:  AAOX3; no focal deficits; CN II-XII grossly intact   SKIN: No rashes or lesions    LABS:                        8.1    6.46  )-----------( 223      ( 21 Apr 2020 07:09 )             26.0     Hgb Trend: 8.1<--, 8.7<--, 8.9<--, 9.6<--, 9.7<--    04-21    141  |  101  |  30<H>  ----------------------------<  129<H>  4.2   |  29  |  4.50<H>    Ca    9.4      21 Apr 2020 07:09  Phos  3.3     04-21  Mg     2.6     04-21    TPro  5.5<L>  /  Alb  3.3  /  TBili  0.4  /  DBili  x   /  AST  27  /  ALT  38  /  AlkPhos  83  04-21    Creatinine Trend: 4.50<--, 5.92<--, 4.89<--, 6.79<--, 5.48<--, 6.41<--    LIVER FUNCTIONS - ( 21 Apr 2020 07:09 )  Alb: 3.3 g/dL / Pro: 5.5 g/dL / ALK PHOS: 83 U/L / ALT: 38 U/L / AST: 27 U/L / GGT: x             C-Reactive Protein, Serum: 0.25 mg/dL (04-21-20 @ 07:09)  C-Reactive Protein, Serum: 0.33 mg/dL (04-20-20 @ 06:23)  C-Reactive Protein, Serum: 0.42 mg/dL <H> (04-19-20 @ 08:19)  C-Reactive Protein, Serum: 0.65 mg/dL <H> (04-18-20 @ 07:56)  C-Reactive Protein, Serum: 0.82 mg/dL <H> (04-17-20 @ 07:18)    Ferritin, Serum: 1146 ng/mL (04-21-20 @ 07:09)  Ferritin, Serum: 1316 ng/mL (04-20-20 @ 06:23)  Ferritin, Serum: 1523 ng/mL (04-19-20 @ 08:19)  Ferritin, Serum: 1676 ng/mL (04-18-20 @ 07:56)  Ferritin, Serum: 1841 ng/mL (04-17-20 @ 07:18)  Ferritin, Serum: 1826 ng/mL (04-16-20 @ 08:24)    D-Dimer Assay, Quantitative (04.20.20 @ 06:23)    D-Dimer Assay, Quantitative: 225 ng/mL DDU

## 2020-04-22 ENCOUNTER — TRANSCRIPTION ENCOUNTER (OUTPATIENT)
Age: 85
End: 2020-04-22

## 2020-04-22 VITALS
DIASTOLIC BLOOD PRESSURE: 52 MMHG | TEMPERATURE: 98 F | RESPIRATION RATE: 17 BRPM | OXYGEN SATURATION: 96 % | SYSTOLIC BLOOD PRESSURE: 120 MMHG | HEART RATE: 70 BPM

## 2020-04-22 LAB
GLUCOSE BLDC GLUCOMTR-MCNC: 115 MG/DL — HIGH (ref 70–99)
GLUCOSE BLDC GLUCOMTR-MCNC: 174 MG/DL — HIGH (ref 70–99)
GLUCOSE BLDC GLUCOMTR-MCNC: 88 MG/DL — SIGNIFICANT CHANGE UP (ref 70–99)

## 2020-04-22 PROCEDURE — 99239 HOSP IP/OBS DSCHRG MGMT >30: CPT | Mod: CS

## 2020-04-22 PROCEDURE — 90935 HEMODIALYSIS ONE EVALUATION: CPT | Mod: CS

## 2020-04-22 RX ORDER — ATORVASTATIN CALCIUM 80 MG/1
1 TABLET, FILM COATED ORAL
Qty: 0 | Refills: 0 | DISCHARGE
Start: 2020-04-22

## 2020-04-22 RX ORDER — DOXAZOSIN MESYLATE 4 MG
1 TABLET ORAL
Qty: 0 | Refills: 0 | DISCHARGE
Start: 2020-04-22

## 2020-04-22 RX ORDER — MONTELUKAST 4 MG/1
1 TABLET, CHEWABLE ORAL
Qty: 30 | Refills: 0
Start: 2020-04-22 | End: 2020-05-21

## 2020-04-22 RX ORDER — ACETAMINOPHEN 500 MG
2 TABLET ORAL
Qty: 0 | Refills: 0 | DISCHARGE
Start: 2020-04-22

## 2020-04-22 RX ORDER — ROSUVASTATIN CALCIUM 5 MG/1
1 TABLET ORAL
Qty: 0 | Refills: 0 | DISCHARGE

## 2020-04-22 RX ORDER — INSULIN GLARGINE 100 [IU]/ML
24 INJECTION, SOLUTION SUBCUTANEOUS
Qty: 1 | Refills: 0
Start: 2020-04-22 | End: 2020-05-21

## 2020-04-22 RX ORDER — ASPIRIN/CALCIUM CARB/MAGNESIUM 324 MG
1 TABLET ORAL
Qty: 0 | Refills: 0 | DISCHARGE
Start: 2020-04-22

## 2020-04-22 RX ORDER — ROSUVASTATIN CALCIUM 5 MG/1
1 TABLET ORAL
Qty: 30 | Refills: 0
Start: 2020-04-22 | End: 2020-05-21

## 2020-04-22 RX ORDER — ASPIRIN/CALCIUM CARB/MAGNESIUM 324 MG
1 TABLET ORAL
Qty: 30 | Refills: 0
Start: 2020-04-22 | End: 2020-05-21

## 2020-04-22 RX ORDER — ALLOPURINOL 300 MG
1 TABLET ORAL
Qty: 15 | Refills: 0
Start: 2020-04-22 | End: 2020-05-21

## 2020-04-22 RX ORDER — AMLODIPINE BESYLATE 2.5 MG/1
1 TABLET ORAL
Qty: 0 | Refills: 0 | DISCHARGE

## 2020-04-22 RX ORDER — INSULIN GLARGINE 100 [IU]/ML
24 INJECTION, SOLUTION SUBCUTANEOUS
Qty: 0 | Refills: 0 | DISCHARGE

## 2020-04-22 RX ORDER — COLCHICINE 0.6 MG
0.6 TABLET ORAL
Qty: 18 | Refills: 0
Start: 2020-04-22 | End: 2020-05-21

## 2020-04-22 RX ORDER — COLCHICINE 0.6 MG
0.6 TABLET ORAL
Qty: 0 | Refills: 0 | DISCHARGE

## 2020-04-22 RX ORDER — SEMAGLUTIDE 0.68 MG/ML
1 INJECTION, SOLUTION SUBCUTANEOUS
Qty: 30 | Refills: 0
Start: 2020-04-22 | End: 2020-05-21

## 2020-04-22 RX ORDER — SEVELAMER CARBONATE 2400 MG/1
2 POWDER, FOR SUSPENSION ORAL
Qty: 180 | Refills: 0
Start: 2020-04-22 | End: 2020-05-21

## 2020-04-22 RX ORDER — FUROSEMIDE 40 MG
0 TABLET ORAL
Qty: 0 | Refills: 0 | DISCHARGE

## 2020-04-22 RX ORDER — SEVELAMER CARBONATE 2400 MG/1
2 POWDER, FOR SUSPENSION ORAL
Qty: 0 | Refills: 0 | DISCHARGE
Start: 2020-04-22

## 2020-04-22 RX ORDER — DOXAZOSIN MESYLATE 4 MG
1 TABLET ORAL
Qty: 30 | Refills: 0
Start: 2020-04-22 | End: 2020-05-21

## 2020-04-22 RX ORDER — FUROSEMIDE 40 MG
1 TABLET ORAL
Qty: 60 | Refills: 0
Start: 2020-04-22 | End: 2020-05-21

## 2020-04-22 RX ORDER — MONTELUKAST 4 MG/1
1 TABLET, CHEWABLE ORAL
Qty: 0 | Refills: 0 | DISCHARGE
Start: 2020-04-22

## 2020-04-22 RX ORDER — REPAGLINIDE 1 MG/1
1 TABLET ORAL
Qty: 90 | Refills: 0
Start: 2020-04-22 | End: 2020-05-21

## 2020-04-22 RX ORDER — REPAGLINIDE 1 MG/1
1 TABLET ORAL
Qty: 0 | Refills: 0 | DISCHARGE

## 2020-04-22 RX ORDER — SEMAGLUTIDE 0.68 MG/ML
1 INJECTION, SOLUTION SUBCUTANEOUS
Qty: 0 | Refills: 0 | DISCHARGE

## 2020-04-22 RX ORDER — AMLODIPINE BESYLATE 2.5 MG/1
1 TABLET ORAL
Qty: 30 | Refills: 0
Start: 2020-04-22 | End: 2020-05-21

## 2020-04-22 RX ORDER — SIMETHICONE 80 MG/1
1 TABLET, CHEWABLE ORAL
Qty: 0 | Refills: 0 | DISCHARGE
Start: 2020-04-22

## 2020-04-22 RX ORDER — ALLOPURINOL 300 MG
1 TABLET ORAL
Qty: 0 | Refills: 0 | DISCHARGE
Start: 2020-04-22

## 2020-04-22 RX ADMIN — SEVELAMER CARBONATE 1600 MILLIGRAM(S): 2400 POWDER, FOR SUSPENSION ORAL at 05:39

## 2020-04-22 RX ADMIN — HEPARIN SODIUM 5000 UNIT(S): 5000 INJECTION INTRAVENOUS; SUBCUTANEOUS at 13:39

## 2020-04-22 RX ADMIN — Medication 2: at 17:40

## 2020-04-22 RX ADMIN — SEVELAMER CARBONATE 1600 MILLIGRAM(S): 2400 POWDER, FOR SUSPENSION ORAL at 17:30

## 2020-04-22 RX ADMIN — FAMOTIDINE 20 MILLIGRAM(S): 10 INJECTION INTRAVENOUS at 05:38

## 2020-04-22 RX ADMIN — MONTELUKAST 10 MILLIGRAM(S): 4 TABLET, CHEWABLE ORAL at 17:30

## 2020-04-22 RX ADMIN — SEVELAMER CARBONATE 1600 MILLIGRAM(S): 2400 POWDER, FOR SUSPENSION ORAL at 13:39

## 2020-04-22 RX ADMIN — Medication 100 MILLIGRAM(S): at 13:39

## 2020-04-22 RX ADMIN — HEPARIN SODIUM 5000 UNIT(S): 5000 INJECTION INTRAVENOUS; SUBCUTANEOUS at 05:38

## 2020-04-22 RX ADMIN — Medication 81 MILLIGRAM(S): at 13:39

## 2020-04-22 NOTE — PROGRESS NOTE ADULT - PROBLEM SELECTOR PLAN 2
Pt with hx of CKD now presented with , Cr 8.16, (unknown baseline).   - HD initiated 4/2/20 via R IJ temp catheter  - Tunneled catheter 4/17 w/ IR  - s/p HD on 4/20  - Daily CMP  - Started sevelamer 1600 TID for elevated phos  - c/w Retacrit qWeek if hgB <10

## 2020-04-22 NOTE — PROGRESS NOTE ADULT - PROBLEM SELECTOR PLAN 3
Pt presented on 4/1 with hgb of 10.6, slow downward trend throughout hospitalization. No melena or BRBPR, HD stable.  - Most likely 2/2 to repeated blood draws in the context of CKD  -c/w Retacrit qWeek as recommended by renal  - Stable today, 8.1 --> 8.4

## 2020-04-22 NOTE — PROGRESS NOTE ADULT - PROBLEM SELECTOR PROBLEM 9
Prophylactic measure

## 2020-04-22 NOTE — PROGRESS NOTE ADULT - SUBJECTIVE AND OBJECTIVE BOX
Patient was seen and evaluated on dialysis.   comfortable  reading newspaper  HR: 65 (04-22-20 @ 06:40)  BP: 120/57 (04-22-20 @ 06:40)  Wt(kg): --  04-22    136  |  94<L>  |  41<H>  ----------------------------<  110<H>  4.2   |  27  |  5.69<H>    Ca    9.5      22 Apr 2020 07:11  Phos  3.4     04-22  Mg     2.7     04-22    TPro  5.5<L>  /  Alb  3.2<L>  /  TBili  0.4  /  DBili  x   /  AST  25  /  ALT  36  /  AlkPhos  86  04-22    Continue dialysis:   Dialyzer:         QB: 400 ml/min  K bath: 2K+  Goal UF: 1.5 Kg  Duration: 3 hours  Patient is tolerating the procedure well.   Continue full hemodialysis treatment as prescribed.

## 2020-04-22 NOTE — PROGRESS NOTE ADULT - REASON FOR ADMISSION
fatigue
fatigue  new ESRD  C+
fatigue
fatigue, COVID +
fatigue
fatigue, COVID

## 2020-04-22 NOTE — PROGRESS NOTE ADULT - ASSESSMENT
84 year old M with PMH of IDDM, HTN, HLD,ESRD on hemodialysis,  sleep apnea (has CPAP machine), gout, Paget's s/p Reclast in 6/2014. 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 and verrucous acanthoma  COVID + - presented with cough, general malaise, and fatigue      --new start ESRD  set up for home hemo once discharged  --anemia  less inflammatory now- ordering retacrit 4000 units IV  --renal bone disease  c/w renvela  -covid- clinically improving    reviewed with med team this AM

## 2020-04-22 NOTE — DISCHARGE NOTE NURSING/CASE MANAGEMENT/SOCIAL WORK - PATIENT PORTAL LINK FT
You can access the FollowMyHealth Patient Portal offered by [facility] by registering at the following website: [email or web address]. By joining [facility]’s FollowMyHealth portal, you will also be able to view your health information using other applications (apps) compatible with our system.

## 2020-04-22 NOTE — PROGRESS NOTE ADULT - SUBJECTIVE AND OBJECTIVE BOX
Patient is a 84y old  Male who presents with a chief complaint of fatigue (21 Apr 2020 11:44)    SUBJECTIVE / OVERNIGHT EVENTS: Patient seen and examined at bedside. No acute events overnight. Reports continued improvement of SOB and cough. O2 requirements decreasing. Complains of soft stool, around 230am. Otherwise eating and drinking well.     MEDICATIONS  (STANDING):  allopurinol 100 milliGRAM(s) Oral daily  aspirin enteric coated 81 milliGRAM(s) Oral daily  atorvastatin 20 milliGRAM(s) Oral at bedtime  dextrose 5%. 1000 milliLiter(s) (50 mL/Hr) IV Continuous <Continuous>  dextrose 50% Injectable 12.5 Gram(s) IV Push once  dextrose 50% Injectable 25 Gram(s) IV Push once  dextrose 50% Injectable 25 Gram(s) IV Push once  doxazosin 8 milliGRAM(s) Oral at bedtime  famotidine    Tablet 20 milliGRAM(s) Oral every 48 hours  heparin  Injectable 5000 Unit(s) SubCutaneous every 8 hours  insulin lispro (HumaLOG) corrective regimen sliding scale   SubCutaneous Before meals and at bedtime  montelukast 10 milliGRAM(s) Oral every 24 hours  sevelamer carbonate 1600 milliGRAM(s) Oral three times a day with meals    MEDICATIONS  (PRN):  acetaminophen   Tablet .. 650 milliGRAM(s) Oral every 6 hours PRN Temp greater or equal to 38C (100.4F), Mild Pain (1 - 3)  albumin human 25% IVPB 50 milliLiter(s) IV Intermittent every 30 minutes PRN sbp<100  aluminum hydroxide/magnesium hydroxide/simethicone Suspension 30 milliLiter(s) Oral every 6 hours PRN Dyspepsia  dextrose 40% Gel 15 Gram(s) Oral once PRN Blood Glucose LESS THAN 70 milliGRAM(s)/deciliter  glucagon  Injectable 1 milliGRAM(s) IntraMuscular once PRN Glucose LESS THAN 70 milligrams/deciliter  simethicone 80 milliGRAM(s) Chew three times a day PRN Gas      Vital Signs Last 24 Hrs  T(C): 36.6 (22 Apr 2020 06:40), Max: 36.7 (21 Apr 2020 12:45)  T(F): 97.9 (22 Apr 2020 06:40), Max: 98 (21 Apr 2020 12:45)  HR: 65 (22 Apr 2020 06:40) (65 - 97)  BP: 120/57 (22 Apr 2020 06:40) (78/45 - 122/66)  BP(mean): --  RR: 17 (22 Apr 2020 06:40) (17 - 20)  SpO2: 95% (22 Apr 2020 06:40) (94% - 95%)  CAPILLARY BLOOD GLUCOSE    POCT Blood Glucose.: 88 mg/dL (22 Apr 2020 08:20)  POCT Blood Glucose.: 170 mg/dL (21 Apr 2020 21:50)  POCT Blood Glucose.: 104 mg/dL (21 Apr 2020 17:20)  POCT Blood Glucose.: 128 mg/dL (21 Apr 2020 12:15)    I&O's Summary    PHYSICAL EXAM:  GENERAL: NAD, well-developed, resting comfortably in bed   HEAD:  Atraumatic, Normocephalic  EYES: EOMI, PERRLA, conjunctiva and sclera clear  ENMT : MMM  NECK: Supple  CHEST/LUNG: Bibasilar crackles appreciated, speaking in full sentences, no evidence of respiratory distress, on 2L NC   HEART: Regular rate and rhythm; Normal S1S2; No murmurs, rubs, or gallops  ABDOMEN: Soft, Nontender, Nondistended; Bowel sounds present; No masses or HSM   EXTREMITIES:  2+ Peripheral Pulses, No clubbing, cyanosis, or edema  NERVOUS SYSTEM:  AAOX3; Moving all four extremities; CN II-XII grossly intact   SKIN: No rashes or lesions    LABS:                        8.4    6.33  )-----------( 219      ( 22 Apr 2020 07:11 )             26.2     Hgb Trend: 8.4<--, 8.1<--, 8.7<--, 8.9<--, 9.6<--  04-22    136  |  94<L>  |  41<H>  ----------------------------<  110<H>  4.2   |  27  |  5.69<H>    Ca    9.5      22 Apr 2020 07:11  Phos  3.4     04-22  Mg     2.7     04-22    TPro  5.5<L>  /  Alb  3.2<L>  /  TBili  0.4  /  DBili  x   /  AST  25  /  ALT  36  /  AlkPhos  86  04-22    Creatinine Trend: 5.69<--, 4.50<--, 5.92<--, 4.89<--, 6.79<--, 5.48<--  LIVER FUNCTIONS - ( 22 Apr 2020 07:11 )  Alb: 3.2 g/dL / Pro: 5.5 g/dL / ALK PHOS: 86 U/L / ALT: 36 U/L / AST: 25 U/L / GGT: x           C-Reactive Protein, Serum: 0.21 mg/dL (04-22-20 @ 07:11)  C-Reactive Protein, Serum: 0.25 mg/dL (04-21-20 @ 07:09)  C-Reactive Protein, Serum: 0.33 mg/dL (04-20-20 @ 06:23)  C-Reactive Protein, Serum: 0.42 mg/dL <H> (04-19-20 @ 08:19)  C-Reactive Protein, Serum: 0.65 mg/dL <H> (04-18-20 @ 07:56)    Ferritin, Serum: 1094 ng/mL (04-22-20 @ 07:13)  Ferritin, Serum: 1146 ng/mL (04-21-20 @ 07:09)  Ferritin, Serum: 1316 ng/mL (04-20-20 @ 06:23)  Ferritin, Serum: 1523 ng/mL (04-19-20 @ 08:19)  Ferritin, Serum: 1676 ng/mL (04-18-20 @ 07:56)    D-Dimer Assay, Quantitative (04.22.20 @ 07:13)    D-Dimer Assay, Quantitative: 288 ng/mL DDU

## 2020-04-22 NOTE — PROGRESS NOTE ADULT - ATTENDING COMMENTS
seen and evaluated while on dialysis  tolerating the procedure well  continue full treatment as prescribed  much awake, alert and oriented
new start ESRD  covid infection- clinically improving  now with some diarrhea- reviewed with med team  tolerating HD treatments adequately  setting up home hemo for him  reviewed with home hemo nurse and pt's wife  next HD inpatient tomorrow, 4/22
seen and evaluated while on dialysis  non rebreather mask, but NAD and does pull it off  confused, but does recognize me  non verbal  tolerating HD well, but Qb was low due to position of catheter- adjusted sutures
seen on HD   UF limited by hypotension and doubt worsened pulm status is volume related -- much more likely to be deut to COVID pneumonia   trying to UF 1 L if able
84yoM w/ h/o IDDM, HTN, HLD, KACI on CPAP, gout, Paget's, CKDV p/w COVID c/b progression of CKD to ESRD requiring HD.  24Hr Events: None  Pt states SOB improving otherwise ROS neg.  93% on 3L O2, CTAB  Inflam markers neg  Continue to wean O2 as tolerated  S/p azithro/plaquenil for COVID  HD per renal  Epo for ACD in setting of ESRD  Monitor FS  BP at goal  Will discuss dispo w/ PCP, PT, and family as no DME present at home
84yoM w/ h/o IDDM, HTN, HLD, KACI on CPAP, gout, Paget's, CKDV p/w COVID c/b progression of CKD to ESRD requiring HD.  24Hr Events: None  Pt states SOB improving otherwise ROS neg.  93% on 3L O2, CTAB  Inflam markers neg  Continue to wean O2 as tolerated  S/p azithro/plaquenil for COVID  HD per renal  Epo for ACD in setting of ESRD  Monitor FS  BP at goal  Will discuss dispo w/ PCP, PT, and family as no DME present at home
Pt. seen and examined by me earlier today; I have read the PA's note, I agree w/ her findings and plan of care as documented; Pt.'s O2 requirements increasing, CCM consulted, will transfer to higher-level of care
84yoM w/ h/o IDDM, HTN, HLD, CKD stage 4, sleep apnea (has CPAP machine), gout, Paget's, melanoma s/p Moh's p/w GRACIE now on HD in setting of COVID.  24Hr Events: Titrated down to 2L O2  C/o fatigue, all other ROS neg  Sat'ing well on NC, exam unchanged  Inflam markers trending down  S/p tx for COVID, wean O2 as tolerated  FS at goal  BP acceptable  HD per renal  For d/c home tomorrow when DME arrives w/ 24hr home care as partner refusing NIRAV
84yoM w/ h/o IDDM, HTN, HLD, KACI on CPAP, gout, Paget's, CKDV p/w COVID c/b progression of CKD to ESRD requiring HD s/p Azithro, Plaquenil.  24Hr Events: Weaned to 1L, HD today  C/o fatigue, ROS otherwise neg  Sat'ing well on NC, exam unchanged  FS at goal  S/p tx for COVID, wean O2 as tolerated  HD per renal  FS at goal  BP at goal  D/c home w/ 24Hr home care when O2 arrives
Assessment on date 04-18-20 Patient personally seen and examined myself during rounds, face-to-face, with the Resident  Note read, including vitals, physical findings, laboratory data, and radiological reports.   Agree with above with revisions, if any included below.     - Chief Complaint: follow up for sob     - Laboratory data reviewed by me    -My exam:  General: WDWN, sitting comfortably in bed eating breakfast   HEENT: NC/AT; PERRL, anicteric sclera; MMM  Neck: supple  Cardiovascular: +S1/S2, RRR  Respiratory: Bibasilar crackles appreciated, titrated to 4L NC spO2 94%, speaking in full sentences with no signs of respiratory distress.   Gastrointestinal: soft, NT/ND; +BSx4  Extremities: WWP; no edema, clubbing or cyanosis  Vascular: 2+ radial, DP/PT pulses B/L  Neurological: AAOx3; no focal deficits; CN II-XII grossly intact     - Diagnosis and Plan:  2019 novel coronavirus: - reviewed daily COVID labs: d-dimer, CRP and ferritin  SOB: -currently on NC     -Plan discussed with  COVID med team regarding med mgmt of COVID infx  Social Work regarding safe discharge planning .
Case discussed with interns and residents. Agree with plan as detailed above.     In setting of COVID crisis, intern exam was used to reduce exposure. Relevant data was reviewed.
Case discussed with resident and fellow. Agree with plan as detailed above.     Based on COVID crisis, intern exam used for assessment to reduced exposure. Remainder of clinical data reviewed.
Case discussed with residents and interns. Agree with plan as detailed above.     Due to COVID crisis, resident's exam was used to reduce exposure. Clinical data reviewed.
Discussed case with the resident and intern. Agree with plan as detailed above.     As per COVID crisis, intern's exam was used to limit exposure. Clinical data reviewed.
Assessment on date 04-17-20 Patient personally seen and examined myself during rounds, face-to-face, with the Resident  Note read, including vitals, physical findings, laboratory data, and radiological reports.   Agree with above with revisions, if any included below.     - Chief Complaint: follow up for sob     - Laboratory data reviewed by me    -My exam:  General: WDWN, NAD, resting comfortably in bed  HEENT: NC/AT; anicteric sclera; MMM  Neck: supple  Cardiovascular: +S1/S2; RRR  Respiratory: faint bibasilar crackles  Gastrointestinal: soft, NT/ND; +BSx4  Extremities: WWP; no edema, clubbing or cyanosis  Vascular: 2+ radial, DP/PT pulses B/L  Neurological: AAOx3    - Diagnosis and Plan:  2019 novel coronavirus: - reviewed daily COVID labs: d-dimer, CRP and ferritin  SOB: -currently on NC     -Plan discussed with  COVID med team regarding med mgmt of COVID infx  Social Work regarding safe discharge planning
Comfortable this morning; PPD read negative by house officer. Labs are improving. Cont supportive care for Covid infection.
Patient seen and examined at bedside. Agree with exam, a/p as above with the following addendum/edits:     Tolerating HD well, BP dropped but making urine, will hold norvasc. C/w plaquenil/azithro. Renally dose meds. Hold vit C pending nephro recs.
Assessment on date 04-19-20 Patient personally seen and examined myself during rounds, face-to-face, with the NPP  Note read, including vitals, physical findings, laboratory data, and radiological reports.   Agree with above with revisions, if any included below.     - Chief Complaint: follow up for sob     - Laboratory data reviewed by me    -My exam:  Constitutional: WDWN, NAD  HEENT: PERRL, EOMI, sclera non-icteric, neck supple, trachea midline, no masses, no JVD, MMM, good dentition  Respiratory: CTA b/l, good air entry b/l, no wheezing, no rhonchi, no rales, without accessory muscle use and no intercostal retractions  Cardiovascular: RRR, normal S1S2, no M/R/G  Gastrointestinal: soft, NTND, no masses palpable, BS normal  Extremities: Warm, well perfused, pulses equal bilateral upper and lower extremities, no edema, no clubbing  Neurological: AAOx3, CN Grossly intact  Skin: Normal temperature, warm, dry    - Diagnosis and Plan:  2019 novel coronavirus: - reviewed daily COVID labs: d-dimer, CRP and ferritin  SOB: -currently on NC     -Plan discussed with  COVID med team regarding med mgmt of COVID infx  Social Work regarding safe discharge planning .

## 2020-04-22 NOTE — PROGRESS NOTE ADULT - PROBLEM SELECTOR PLAN 9
DVT ppx: HSQ   GI ppx: pepcid     Code- Full Code  DIspo: [facility]
DVT ppx: HSQ   GI ppx: pepcid     Code- Full Code  DIspo: [facility]
DVT ppx: HSQ   GI ppx: pepcid     Code- Full Code  DIspo: [facility]
DVT ppx: HSQ   GI ppx: pepcid     Code- Full Code  DIspo: [facility]
DVT ppx: HSQ   GI ppx: pepcid     Code- Full Code  DIspo: [facility]
DVT ppx: HSQ   GI ppx: pepcid     Code- Full Code  DIspo: [facility]
DVT ppx: HSQ   GI ppx: pepcid     Code- Full Code  DIspo: [facility]
DVT ppx: HSQ   GI ppx: pepcid     Code- Full Code  DIspo: [facility]
DVT ppx: HSQ   GI ppx: pepcid     Code- Full Code  DIspo: [facility]
DVT ppx: HSQ   GI ppx: pepcid     Code- Full Code  DIspo: [facility]
DVT ppx: HSQ   GI ppx: pepcid     Code- Full Code  DIspo: [facility]
DVT ppx: HSQ   GI ppx: pepcid   Code- Full Code  Dispo: PT consult called, will talk to social work, pending d/c home with nursing, HD etc
DVT ppx: HSQ   GI ppx: pepcid   Code- Full Code  Dispo: PT consult called, will talk to social work, pending d/c home with nursing, HD etc
DVT ppx: HSQ   GI ppx: pepcid     Code- Full Code  DIspo: [facility]
DVT ppx: HSQ   GI ppx: pepcid     Code- Full Code  DIspo: [facility]
DVT ppx: HSQ   GI ppx: pepcid     Code- Full Code  DIspo: [facility] [[email or web address]
DVT ppx: HSQ   GI ppx: pepcid     Code- Full Code  DIspo: [facility]

## 2020-04-30 PROBLEM — E11.9 TYPE 2 DIABETES MELLITUS WITHOUT COMPLICATIONS: Chronic | Status: ACTIVE | Noted: 2020-04-01

## 2020-04-30 PROBLEM — M10.9 GOUT, UNSPECIFIED: Chronic | Status: ACTIVE | Noted: 2020-04-01

## 2020-04-30 PROBLEM — N18.9 CHRONIC KIDNEY DISEASE, UNSPECIFIED: Chronic | Status: ACTIVE | Noted: 2020-04-01

## 2020-04-30 PROBLEM — I10 ESSENTIAL (PRIMARY) HYPERTENSION: Chronic | Status: ACTIVE | Noted: 2020-04-01

## 2020-04-30 PROBLEM — E78.5 HYPERLIPIDEMIA, UNSPECIFIED: Chronic | Status: ACTIVE | Noted: 2020-04-01

## 2020-05-03 ENCOUNTER — RX RENEWAL (OUTPATIENT)
Age: 85
End: 2020-05-03

## 2020-05-05 DIAGNOSIS — G47.33 OBSTRUCTIVE SLEEP APNEA (ADULT) (PEDIATRIC): ICD-10-CM

## 2020-05-05 DIAGNOSIS — N18.6 END STAGE RENAL DISEASE: ICD-10-CM

## 2020-05-05 DIAGNOSIS — Z88.0 ALLERGY STATUS TO PENICILLIN: ICD-10-CM

## 2020-05-05 DIAGNOSIS — U07.1 COVID-19: ICD-10-CM

## 2020-05-05 DIAGNOSIS — M10.9 GOUT, UNSPECIFIED: ICD-10-CM

## 2020-05-05 DIAGNOSIS — E87.2 ACIDOSIS: ICD-10-CM

## 2020-05-05 DIAGNOSIS — I12.0 HYPERTENSIVE CHRONIC KIDNEY DISEASE WITH STAGE 5 CHRONIC KIDNEY DISEASE OR END STAGE RENAL DISEASE: ICD-10-CM

## 2020-05-05 DIAGNOSIS — E43 UNSPECIFIED SEVERE PROTEIN-CALORIE MALNUTRITION: ICD-10-CM

## 2020-05-05 DIAGNOSIS — Z88.2 ALLERGY STATUS TO SULFONAMIDES: ICD-10-CM

## 2020-05-05 DIAGNOSIS — H91.93 UNSPECIFIED HEARING LOSS, BILATERAL: ICD-10-CM

## 2020-05-05 DIAGNOSIS — R19.7 DIARRHEA, UNSPECIFIED: ICD-10-CM

## 2020-05-05 DIAGNOSIS — E11.22 TYPE 2 DIABETES MELLITUS WITH DIABETIC CHRONIC KIDNEY DISEASE: ICD-10-CM

## 2020-05-05 DIAGNOSIS — E78.5 HYPERLIPIDEMIA, UNSPECIFIED: ICD-10-CM

## 2020-05-05 DIAGNOSIS — J12.89 OTHER VIRAL PNEUMONIA: ICD-10-CM

## 2020-05-05 DIAGNOSIS — E87.5 HYPERKALEMIA: ICD-10-CM

## 2020-05-05 DIAGNOSIS — D63.1 ANEMIA IN CHRONIC KIDNEY DISEASE: ICD-10-CM

## 2020-05-05 DIAGNOSIS — N17.0 ACUTE KIDNEY FAILURE WITH TUBULAR NECROSIS: ICD-10-CM

## 2020-05-05 DIAGNOSIS — E83.39 OTHER DISORDERS OF PHOSPHORUS METABOLISM: ICD-10-CM

## 2020-05-05 DIAGNOSIS — I95.9 HYPOTENSION, UNSPECIFIED: ICD-10-CM

## 2020-05-05 DIAGNOSIS — R53.81 OTHER MALAISE: ICD-10-CM

## 2020-05-05 DIAGNOSIS — N25.81 SECONDARY HYPERPARATHYROIDISM OF RENAL ORIGIN: ICD-10-CM

## 2020-05-07 ENCOUNTER — APPOINTMENT (OUTPATIENT)
Dept: INTERVENTIONAL RADIOLOGY/VASCULAR | Facility: HOSPITAL | Age: 85
End: 2020-05-07
Payer: MEDICARE

## 2020-05-07 ENCOUNTER — OUTPATIENT (OUTPATIENT)
Dept: OUTPATIENT SERVICES | Facility: HOSPITAL | Age: 85
LOS: 1 days | End: 2020-05-07
Payer: MEDICARE

## 2020-05-07 ENCOUNTER — RESULT REVIEW (OUTPATIENT)
Age: 85
End: 2020-05-07

## 2020-05-07 PROCEDURE — 36580 REPLACE CVAD CATH: CPT

## 2020-05-07 PROCEDURE — C1750: CPT

## 2020-05-07 PROCEDURE — C1769: CPT

## 2020-05-11 ENCOUNTER — APPOINTMENT (OUTPATIENT)
Dept: ENDOCRINOLOGY | Facility: CLINIC | Age: 85
End: 2020-05-11
Payer: MEDICARE

## 2020-05-11 ENCOUNTER — APPOINTMENT (OUTPATIENT)
Dept: ENDOCRINOLOGY | Facility: CLINIC | Age: 85
End: 2020-05-11

## 2020-05-11 PROCEDURE — 99213 OFFICE O/P EST LOW 20 MIN: CPT | Mod: 95

## 2020-05-11 NOTE — ASSESSMENT
[FreeTextEntry1] : Diabetes, CKD.  A1c at/below goal\par Reduction in diabetes medications due to weight loss and ESRD (longer duration of medication action)\par agree with reduction in Lantus dose.  I prefer him to be on Ozempic than Prandin because Prandin has more potential to cause hypoglycemia, so will change to Ozempic 0.5mg per week and discontinue Prandin.\par RTO 1 month by telehealth

## 2020-05-11 NOTE — HISTORY OF PRESENT ILLNESS
[Home] : at home, [unfilled] , at the time of the visit. [Medical Office: ([facility])___] : at the medical office located in  [Patient] : the patient [Self] : self [FreeTextEntry1] :                                AM.                   PM\par                 4/22.                               160\par                 4 25.       123.                \par                 4/26.        96.                  99\par                 4/27.        89.                  108\par                 4/28.       87.                    96\par                 4/29.       92 \par                 4/30.      105.                  91\par                 5/1.           97.                  176\par                 5/2.          99.                  174\par                 5/3.          93.                  113\par                 5/4.          81.                  176\par                 5/5.         102.                 119\par                  5/6   Am 107.        PM.  185\par                  5/7.         134.                166 very late lunch \par                  5/8.         114.                136\par                  5/9.         104.               100\par                  5/10.       94.                   90\par \par As prescribed at discharge I am taking:\par \par                  Prandin.    .5 mg.     3X with meals \par                  Lantus.     16 units in the evening\par                  No Ozempic\par \par On video, he looks visibly thinner.  weight today at dialysis was 174 lb\par hospitalized for three weeks with upper respiratory infection, discharged two weeks ago\par slowly is regaining strength, has been going to PT and OT\par now is able to walk around the driveway of his building.  when he got home from hospital, he could not lift his arm\par appetite is good\par he can't explain why some sugars are high after lunch and some are not; the meals are similar.\par \par PMH: CKD\par sleep apnea, has CPAP machine\par gout, arthritis\par Paget's s/p Reclast in 6/2014. alk phos normal since.\par melanoma, top of head 2019 s/p Moh's.  L pre tibial lesion: verrucous acanthoma\par \par Meds:\par Lantus 16 units, Ozempic 1mg/week (on hold)\par Prandin 0.5mg tid with meals, TID.\par amlodipine 10mg, doxazosin 8mg hs\par Flonase, azelastine, Claritin, montelukast\par furosemide 20mg, 2 tab/day\par aspirin 81mg, Crestor 20mg\par ranitidine 150mg/day\par allopurinol 100mg bid, colchicine prn\par vitamin D 5000/day, Citrucel, Miralax, glucosamine\par

## 2020-05-14 ENCOUNTER — APPOINTMENT (OUTPATIENT)
Dept: INTERVENTIONAL RADIOLOGY/VASCULAR | Facility: HOSPITAL | Age: 85
End: 2020-05-14
Payer: MEDICARE

## 2020-05-14 ENCOUNTER — RESULT REVIEW (OUTPATIENT)
Age: 85
End: 2020-05-14

## 2020-05-14 ENCOUNTER — OUTPATIENT (OUTPATIENT)
Dept: OUTPATIENT SERVICES | Facility: HOSPITAL | Age: 85
LOS: 1 days | End: 2020-05-14
Payer: MEDICARE

## 2020-05-14 PROCEDURE — C1750: CPT

## 2020-05-14 PROCEDURE — 99152 MOD SED SAME PHYS/QHP 5/>YRS: CPT

## 2020-05-14 PROCEDURE — C1769: CPT

## 2020-05-14 PROCEDURE — 76937 US GUIDE VASCULAR ACCESS: CPT | Mod: 26

## 2020-05-14 PROCEDURE — 99153 MOD SED SAME PHYS/QHP EA: CPT

## 2020-05-14 PROCEDURE — 76937 US GUIDE VASCULAR ACCESS: CPT

## 2020-05-14 PROCEDURE — 77001 FLUOROGUIDE FOR VEIN DEVICE: CPT | Mod: 26

## 2020-05-14 PROCEDURE — 36558 INSERT TUNNELED CV CATH: CPT

## 2020-05-14 PROCEDURE — 77001 FLUOROGUIDE FOR VEIN DEVICE: CPT

## 2020-05-18 ENCOUNTER — RX RENEWAL (OUTPATIENT)
Age: 85
End: 2020-05-18

## 2020-05-21 ENCOUNTER — APPOINTMENT (OUTPATIENT)
Dept: RADIOLOGY | Facility: CLINIC | Age: 85
End: 2020-05-21

## 2020-05-21 ENCOUNTER — APPOINTMENT (OUTPATIENT)
Dept: PULMONOLOGY | Facility: CLINIC | Age: 85
End: 2020-05-21
Payer: MEDICARE

## 2020-05-21 ENCOUNTER — OUTPATIENT (OUTPATIENT)
Dept: OUTPATIENT SERVICES | Facility: HOSPITAL | Age: 85
LOS: 1 days | End: 2020-05-21
Payer: MEDICARE

## 2020-05-21 VITALS
HEIGHT: 66 IN | HEART RATE: 87 BPM | TEMPERATURE: 97.6 F | DIASTOLIC BLOOD PRESSURE: 67 MMHG | BODY MASS INDEX: 29.25 KG/M2 | WEIGHT: 182 LBS | OXYGEN SATURATION: 95 % | SYSTOLIC BLOOD PRESSURE: 111 MMHG

## 2020-05-21 PROCEDURE — 71046 X-RAY EXAM CHEST 2 VIEWS: CPT | Mod: 26,CS

## 2020-05-21 PROCEDURE — 99204 OFFICE O/P NEW MOD 45 MIN: CPT | Mod: CS

## 2020-05-27 DIAGNOSIS — E83.39 OTHER DISORDERS OF PHOSPHORUS METABOLISM: ICD-10-CM

## 2020-05-28 NOTE — PHYSICAL EXAM
[No Acute Distress] : no acute distress [Normal Appearance] : normal appearance [Normal Oropharynx] : normal oropharynx [Normal S1, S2] : normal s1, s2 [Normal Rate/Rhythm] : normal rate/rhythm [No Neck Mass] : no neck mass [No Resp Distress] : no resp distress [No Abnormalities] : no abnormalities [No Murmurs] : no murmurs [Normal Gait] : normal gait [Benign] : benign [No Clubbing] : no clubbing [FROM] : FROM [No Cyanosis] : no cyanosis [No Focal Deficits] : no focal deficits [1+ Pitting] : 1+ pitting [Normal Color/ Pigmentation] : normal color/ pigmentation [Normal Affect] : normal affect [Oriented x3] : oriented x3 [TextBox_68] : bibasilar crackles

## 2020-05-28 NOTE — ASSESSMENT
[FreeTextEntry1] : Covid pneumonia, improved but still dyspneic and fatigued.  CXR repeated today: still abnormal with patchy airspace disease as well as vascular congestion.  Saturation OK on ambulating in lauren about 100 ft, low 90% without oxygen. Long discussion about Covid pna, some of CXR abnormalities may be fluid, but explained resolution of Covid can be prolonged, appears to be improving, chronic fibrosis is possible.  Return in about 4-6 weeks depending on sx.

## 2020-05-28 NOTE — HISTORY OF PRESENT ILLNESS
[TextBox_4] : 84 yr old M w hx ESRD, diabetes. obstructive sleep apnea,  admitted to  4/1/2020 with hypoxemia, bilateral CXR opacities, dx'ed with Covid-19.  Ferritin high 1700, d-dimer 288, CRP normal.  Hemodialysis started. Discharged 4/22 with last  CXR (reviewed) showing bilateral infiltrates, some of which may be c/w fliuid overload as well as Covd-19 pneumonia. Much better subjectively since, still fatigued, dyspneic on  moderate exertion, no cough or fever.

## 2020-06-02 PROCEDURE — 97116 GAIT TRAINING THERAPY: CPT

## 2020-06-02 PROCEDURE — 80053 COMPREHEN METABOLIC PANEL: CPT

## 2020-06-02 PROCEDURE — 85025 COMPLETE CBC W/AUTO DIFF WBC: CPT

## 2020-06-02 PROCEDURE — 36556 INSERT NON-TUNNEL CV CATH: CPT

## 2020-06-02 PROCEDURE — 82550 ASSAY OF CK (CPK): CPT

## 2020-06-02 PROCEDURE — 77001 FLUOROGUIDE FOR VEIN DEVICE: CPT

## 2020-06-02 PROCEDURE — 71045 X-RAY EXAM CHEST 1 VIEW: CPT

## 2020-06-02 PROCEDURE — 36415 COLL VENOUS BLD VENIPUNCTURE: CPT

## 2020-06-02 PROCEDURE — 84100 ASSAY OF PHOSPHORUS: CPT

## 2020-06-02 PROCEDURE — 97530 THERAPEUTIC ACTIVITIES: CPT

## 2020-06-02 PROCEDURE — 87086 URINE CULTURE/COLONY COUNT: CPT

## 2020-06-02 PROCEDURE — 51702 INSERT TEMP BLADDER CATH: CPT

## 2020-06-02 PROCEDURE — 83880 ASSAY OF NATRIURETIC PEPTIDE: CPT

## 2020-06-02 PROCEDURE — 76937 US GUIDE VASCULAR ACCESS: CPT

## 2020-06-02 PROCEDURE — 86704 HEP B CORE ANTIBODY TOTAL: CPT

## 2020-06-02 PROCEDURE — 85379 FIBRIN DEGRADATION QUANT: CPT

## 2020-06-02 PROCEDURE — 82553 CREATINE MB FRACTION: CPT

## 2020-06-02 PROCEDURE — 84145 PROCALCITONIN (PCT): CPT

## 2020-06-02 PROCEDURE — 85610 PROTHROMBIN TIME: CPT

## 2020-06-02 PROCEDURE — 80074 ACUTE HEPATITIS PANEL: CPT

## 2020-06-02 PROCEDURE — C1752: CPT

## 2020-06-02 PROCEDURE — 87340 HEPATITIS B SURFACE AG IA: CPT

## 2020-06-02 PROCEDURE — 87633 RESP VIRUS 12-25 TARGETS: CPT

## 2020-06-02 PROCEDURE — 86140 C-REACTIVE PROTEIN: CPT

## 2020-06-02 PROCEDURE — 83615 LACTATE (LD) (LDH) ENZYME: CPT

## 2020-06-02 PROCEDURE — 87040 BLOOD CULTURE FOR BACTERIA: CPT

## 2020-06-02 PROCEDURE — 36558 INSERT TUNNELED CV CATH: CPT

## 2020-06-02 PROCEDURE — 82803 BLOOD GASES ANY COMBINATION: CPT

## 2020-06-02 PROCEDURE — 86803 HEPATITIS C AB TEST: CPT

## 2020-06-02 PROCEDURE — 90935 HEMODIALYSIS ONE EVALUATION: CPT

## 2020-06-02 PROCEDURE — 83735 ASSAY OF MAGNESIUM: CPT

## 2020-06-02 PROCEDURE — P9047: CPT

## 2020-06-02 PROCEDURE — 86706 HEP B SURFACE ANTIBODY: CPT

## 2020-06-02 PROCEDURE — 82310 ASSAY OF CALCIUM: CPT

## 2020-06-02 PROCEDURE — 87635 SARS-COV-2 COVID-19 AMP PRB: CPT

## 2020-06-02 PROCEDURE — 99152 MOD SED SAME PHYS/QHP 5/>YRS: CPT

## 2020-06-02 PROCEDURE — 84484 ASSAY OF TROPONIN QUANT: CPT

## 2020-06-02 PROCEDURE — 85652 RBC SED RATE AUTOMATED: CPT

## 2020-06-02 PROCEDURE — 96374 THER/PROPH/DIAG INJ IV PUSH: CPT | Mod: XU

## 2020-06-02 PROCEDURE — C1750: CPT

## 2020-06-02 PROCEDURE — 81001 URINALYSIS AUTO W/SCOPE: CPT

## 2020-06-02 PROCEDURE — C1769: CPT

## 2020-06-02 PROCEDURE — 82728 ASSAY OF FERRITIN: CPT

## 2020-06-02 PROCEDURE — 83605 ASSAY OF LACTIC ACID: CPT

## 2020-06-02 PROCEDURE — 84132 ASSAY OF SERUM POTASSIUM: CPT

## 2020-06-02 PROCEDURE — 83970 ASSAY OF PARATHORMONE: CPT

## 2020-06-02 PROCEDURE — 97110 THERAPEUTIC EXERCISES: CPT

## 2020-06-02 PROCEDURE — 85730 THROMBOPLASTIN TIME PARTIAL: CPT

## 2020-06-02 PROCEDURE — 99285 EMERGENCY DEPT VISIT HI MDM: CPT | Mod: 25

## 2020-06-02 PROCEDURE — 83036 HEMOGLOBIN GLYCOSYLATED A1C: CPT

## 2020-06-02 PROCEDURE — 82330 ASSAY OF CALCIUM: CPT

## 2020-06-02 PROCEDURE — 97161 PT EVAL LOW COMPLEX 20 MIN: CPT

## 2020-06-02 PROCEDURE — 93005 ELECTROCARDIOGRAM TRACING: CPT | Mod: XU

## 2020-06-02 PROCEDURE — 82962 GLUCOSE BLOOD TEST: CPT

## 2020-06-02 PROCEDURE — 84295 ASSAY OF SERUM SODIUM: CPT

## 2020-06-08 ENCOUNTER — TRANSCRIPTION ENCOUNTER (OUTPATIENT)
Age: 85
End: 2020-06-08

## 2020-06-08 ENCOUNTER — INPATIENT (INPATIENT)
Facility: HOSPITAL | Age: 85
LOS: 0 days | Discharge: HOME HEALTH | DRG: 640 | End: 2020-06-09
Attending: HOSPITALIST | Admitting: HOSPITALIST
Payer: COMMERCIAL

## 2020-06-08 VITALS
SYSTOLIC BLOOD PRESSURE: 149 MMHG | DIASTOLIC BLOOD PRESSURE: 69 MMHG | HEIGHT: 66 IN | RESPIRATION RATE: 18 BRPM | TEMPERATURE: 98 F | HEART RATE: 51 BPM | WEIGHT: 192.9 LBS | OXYGEN SATURATION: 96 %

## 2020-06-08 LAB
ALBUMIN SERPL ELPH-MCNC: 3.8 G/DL — SIGNIFICANT CHANGE UP (ref 3.3–5)
ALP SERPL-CCNC: 61 U/L — SIGNIFICANT CHANGE UP (ref 40–120)
ALT FLD-CCNC: 15 U/L — SIGNIFICANT CHANGE UP (ref 10–45)
ANION GAP SERPL CALC-SCNC: 13 MMOL/L — SIGNIFICANT CHANGE UP (ref 5–17)
APTT BLD: 37.1 SEC — HIGH (ref 27.5–36.3)
AST SERPL-CCNC: 11 U/L — SIGNIFICANT CHANGE UP (ref 10–40)
BASOPHILS # BLD AUTO: 0.1 K/UL — SIGNIFICANT CHANGE UP (ref 0–0.2)
BASOPHILS NFR BLD AUTO: 1.8 % — SIGNIFICANT CHANGE UP (ref 0–2)
BILIRUB SERPL-MCNC: 0.3 MG/DL — SIGNIFICANT CHANGE UP (ref 0.2–1.2)
BUN SERPL-MCNC: 75 MG/DL — HIGH (ref 7–23)
CALCIUM SERPL-MCNC: 9.6 MG/DL — SIGNIFICANT CHANGE UP (ref 8.4–10.5)
CHLORIDE SERPL-SCNC: 107 MMOL/L — SIGNIFICANT CHANGE UP (ref 96–108)
CK SERPL-CCNC: 56 U/L — SIGNIFICANT CHANGE UP (ref 30–200)
CO2 SERPL-SCNC: 22 MMOL/L — SIGNIFICANT CHANGE UP (ref 22–31)
CREAT SERPL-MCNC: 6.38 MG/DL — HIGH (ref 0.5–1.3)
CRP SERPL-MCNC: 0.06 MG/DL — SIGNIFICANT CHANGE UP (ref 0–0.4)
EOSINOPHIL # BLD AUTO: 0.1 K/UL — SIGNIFICANT CHANGE UP (ref 0–0.5)
EOSINOPHIL NFR BLD AUTO: 1.8 % — SIGNIFICANT CHANGE UP (ref 0–6)
GIANT PLATELETS BLD QL SMEAR: PRESENT — SIGNIFICANT CHANGE UP
GLUCOSE BLDC GLUCOMTR-MCNC: 87 MG/DL — SIGNIFICANT CHANGE UP (ref 70–99)
GLUCOSE SERPL-MCNC: 135 MG/DL — HIGH (ref 70–99)
HBV SURFACE AB SER-ACNC: SIGNIFICANT CHANGE UP
HBV SURFACE AG SER-ACNC: SIGNIFICANT CHANGE UP
HCT VFR BLD CALC: 27 % — LOW (ref 39–50)
HCV AB S/CO SERPL IA: 0.06 S/CO — SIGNIFICANT CHANGE UP
HCV AB SERPL-IMP: SIGNIFICANT CHANGE UP
HGB BLD-MCNC: 8.4 G/DL — LOW (ref 13–17)
INR BLD: 1.02 — SIGNIFICANT CHANGE UP (ref 0.88–1.16)
LYMPHOCYTES # BLD AUTO: 1.48 K/UL — SIGNIFICANT CHANGE UP (ref 1–3.3)
LYMPHOCYTES # BLD AUTO: 25.4 % — SIGNIFICANT CHANGE UP (ref 13–44)
MANUAL SMEAR VERIFICATION: SIGNIFICANT CHANGE UP
MCHC RBC-ENTMCNC: 31.1 GM/DL — LOW (ref 32–36)
MCHC RBC-ENTMCNC: 32.8 PG — SIGNIFICANT CHANGE UP (ref 27–34)
MCV RBC AUTO: 105.5 FL — HIGH (ref 80–100)
MICROCYTES BLD QL: SLIGHT — SIGNIFICANT CHANGE UP
MONOCYTES # BLD AUTO: 0.26 K/UL — SIGNIFICANT CHANGE UP (ref 0–0.9)
MONOCYTES NFR BLD AUTO: 4.4 % — SIGNIFICANT CHANGE UP (ref 2–14)
NEUTROPHILS # BLD AUTO: 3.87 K/UL — SIGNIFICANT CHANGE UP (ref 1.8–7.4)
NEUTROPHILS NFR BLD AUTO: 64 % — SIGNIFICANT CHANGE UP (ref 43–77)
NEUTS BAND # BLD: 2.6 % — SIGNIFICANT CHANGE UP (ref 0–8)
NT-PROBNP SERPL-SCNC: 6007 PG/ML — HIGH (ref 0–300)
OVALOCYTES BLD QL SMEAR: SLIGHT — SIGNIFICANT CHANGE UP
PLAT MORPH BLD: ABNORMAL
PLATELET # BLD AUTO: 152 K/UL — SIGNIFICANT CHANGE UP (ref 150–400)
POTASSIUM SERPL-MCNC: 6.9 MMOL/L — CRITICAL HIGH (ref 3.5–5.3)
POTASSIUM SERPL-SCNC: 6.9 MMOL/L — CRITICAL HIGH (ref 3.5–5.3)
PROT SERPL-MCNC: 6.1 G/DL — SIGNIFICANT CHANGE UP (ref 6–8.3)
PROTHROM AB SERPL-ACNC: 11.6 SEC — SIGNIFICANT CHANGE UP (ref 10–12.9)
RBC # BLD: 2.56 M/UL — LOW (ref 4.2–5.8)
RBC # FLD: 15.2 % — HIGH (ref 10.3–14.5)
RBC BLD AUTO: ABNORMAL
SARS-COV-2 RNA SPEC QL NAA+PROBE: SIGNIFICANT CHANGE UP
SODIUM SERPL-SCNC: 142 MMOL/L — SIGNIFICANT CHANGE UP (ref 135–145)
TARGETS BLD QL SMEAR: SLIGHT — SIGNIFICANT CHANGE UP
TROPONIN T SERPL-MCNC: 0.07 NG/ML — CRITICAL HIGH (ref 0–0.01)
WBC # BLD: 5.81 K/UL — SIGNIFICANT CHANGE UP (ref 3.8–10.5)
WBC # FLD AUTO: 5.81 K/UL — SIGNIFICANT CHANGE UP (ref 3.8–10.5)

## 2020-06-08 PROCEDURE — 99223 1ST HOSP IP/OBS HIGH 75: CPT

## 2020-06-08 PROCEDURE — 99291 CRITICAL CARE FIRST HOUR: CPT | Mod: CS

## 2020-06-08 PROCEDURE — 93010 ELECTROCARDIOGRAM REPORT: CPT

## 2020-06-08 PROCEDURE — 71045 X-RAY EXAM CHEST 1 VIEW: CPT | Mod: 26

## 2020-06-08 RX ORDER — MONTELUKAST 4 MG/1
10 TABLET, CHEWABLE ORAL EVERY 24 HOURS
Refills: 0 | Status: DISCONTINUED | OUTPATIENT
Start: 2020-06-08 | End: 2020-06-09

## 2020-06-08 RX ORDER — SODIUM ZIRCONIUM CYCLOSILICATE 10 G/10G
10 POWDER, FOR SUSPENSION ORAL ONCE
Refills: 0 | Status: COMPLETED | OUTPATIENT
Start: 2020-06-08 | End: 2020-06-08

## 2020-06-08 RX ORDER — DEXTROSE 50 % IN WATER 50 %
25 SYRINGE (ML) INTRAVENOUS ONCE
Refills: 0 | Status: DISCONTINUED | OUTPATIENT
Start: 2020-06-08 | End: 2020-06-09

## 2020-06-08 RX ORDER — ASPIRIN/CALCIUM CARB/MAGNESIUM 324 MG
81 TABLET ORAL DAILY
Refills: 0 | Status: DISCONTINUED | OUTPATIENT
Start: 2020-06-08 | End: 2020-06-09

## 2020-06-08 RX ORDER — INSULIN GLARGINE 100 [IU]/ML
16 INJECTION, SOLUTION SUBCUTANEOUS AT BEDTIME
Refills: 0 | Status: DISCONTINUED | OUTPATIENT
Start: 2020-06-08 | End: 2020-06-09

## 2020-06-08 RX ORDER — SODIUM BICARBONATE 1 MEQ/ML
50 SYRINGE (ML) INTRAVENOUS ONCE
Refills: 0 | Status: COMPLETED | OUTPATIENT
Start: 2020-06-08 | End: 2020-06-08

## 2020-06-08 RX ORDER — DOXAZOSIN MESYLATE 4 MG
8 TABLET ORAL AT BEDTIME
Refills: 0 | Status: DISCONTINUED | OUTPATIENT
Start: 2020-06-08 | End: 2020-06-09

## 2020-06-08 RX ORDER — ATROPINE SULFATE 0.1 MG/ML
1 SYRINGE (ML) INJECTION ONCE
Refills: 0 | Status: DISCONTINUED | OUTPATIENT
Start: 2020-06-08 | End: 2020-06-08

## 2020-06-08 RX ORDER — ALBUMIN HUMAN 25 %
50 VIAL (ML) INTRAVENOUS
Refills: 0 | Status: DISCONTINUED | OUTPATIENT
Start: 2020-06-08 | End: 2020-06-09

## 2020-06-08 RX ORDER — DEXTROSE 50 % IN WATER 50 %
12.5 SYRINGE (ML) INTRAVENOUS ONCE
Refills: 0 | Status: DISCONTINUED | OUTPATIENT
Start: 2020-06-08 | End: 2020-06-09

## 2020-06-08 RX ORDER — DEXTROSE 50 % IN WATER 50 %
50 SYRINGE (ML) INTRAVENOUS ONCE
Refills: 0 | Status: COMPLETED | OUTPATIENT
Start: 2020-06-08 | End: 2020-06-08

## 2020-06-08 RX ORDER — ATROPINE SULFATE 0.1 MG/ML
1 SYRINGE (ML) INJECTION ONCE
Refills: 0 | Status: DISCONTINUED | OUTPATIENT
Start: 2020-06-08 | End: 2020-06-09

## 2020-06-08 RX ORDER — ATORVASTATIN CALCIUM 80 MG/1
80 TABLET, FILM COATED ORAL AT BEDTIME
Refills: 0 | Status: DISCONTINUED | OUTPATIENT
Start: 2020-06-08 | End: 2020-06-09

## 2020-06-08 RX ORDER — CALCIUM GLUCONATE 100 MG/ML
1 VIAL (ML) INTRAVENOUS ONCE
Refills: 0 | Status: COMPLETED | OUTPATIENT
Start: 2020-06-08 | End: 2020-06-08

## 2020-06-08 RX ORDER — DEXTROSE 50 % IN WATER 50 %
15 SYRINGE (ML) INTRAVENOUS ONCE
Refills: 0 | Status: DISCONTINUED | OUTPATIENT
Start: 2020-06-08 | End: 2020-06-09

## 2020-06-08 RX ORDER — HEPARIN SODIUM 5000 [USP'U]/ML
5000 INJECTION INTRAVENOUS; SUBCUTANEOUS EVERY 8 HOURS
Refills: 0 | Status: DISCONTINUED | OUTPATIENT
Start: 2020-06-08 | End: 2020-06-09

## 2020-06-08 RX ORDER — INSULIN LISPRO 100/ML
VIAL (ML) SUBCUTANEOUS
Refills: 0 | Status: DISCONTINUED | OUTPATIENT
Start: 2020-06-08 | End: 2020-06-09

## 2020-06-08 RX ORDER — INSULIN HUMAN 100 [IU]/ML
5 INJECTION, SOLUTION SUBCUTANEOUS ONCE
Refills: 0 | Status: COMPLETED | OUTPATIENT
Start: 2020-06-08 | End: 2020-06-08

## 2020-06-08 RX ORDER — GLUCAGON INJECTION, SOLUTION 0.5 MG/.1ML
1 INJECTION, SOLUTION SUBCUTANEOUS ONCE
Refills: 0 | Status: DISCONTINUED | OUTPATIENT
Start: 2020-06-08 | End: 2020-06-09

## 2020-06-08 RX ORDER — SEVELAMER CARBONATE 2400 MG/1
1600 POWDER, FOR SUSPENSION ORAL
Refills: 0 | Status: DISCONTINUED | OUTPATIENT
Start: 2020-06-08 | End: 2020-06-09

## 2020-06-08 RX ORDER — SODIUM CHLORIDE 9 MG/ML
1000 INJECTION, SOLUTION INTRAVENOUS
Refills: 0 | Status: DISCONTINUED | OUTPATIENT
Start: 2020-06-08 | End: 2020-06-09

## 2020-06-08 RX ADMIN — MONTELUKAST 10 MILLIGRAM(S): 4 TABLET, CHEWABLE ORAL at 22:49

## 2020-06-08 RX ADMIN — Medication 50 MILLIEQUIVALENT(S): at 19:50

## 2020-06-08 RX ADMIN — Medication 100 GRAM(S): at 19:50

## 2020-06-08 RX ADMIN — Medication 50 MILLILITER(S): at 20:53

## 2020-06-08 RX ADMIN — Medication 1 GRAM(S): at 21:05

## 2020-06-08 RX ADMIN — Medication 100 GRAM(S): at 20:52

## 2020-06-08 RX ADMIN — INSULIN GLARGINE 16 UNIT(S): 100 INJECTION, SOLUTION SUBCUTANEOUS at 23:48

## 2020-06-08 RX ADMIN — INSULIN HUMAN 5 UNIT(S): 100 INJECTION, SOLUTION SUBCUTANEOUS at 20:52

## 2020-06-08 RX ADMIN — SODIUM ZIRCONIUM CYCLOSILICATE 10 GRAM(S): 10 POWDER, FOR SUSPENSION ORAL at 20:53

## 2020-06-08 RX ADMIN — ATORVASTATIN CALCIUM 80 MILLIGRAM(S): 80 TABLET, FILM COATED ORAL at 22:49

## 2020-06-08 NOTE — H&P ADULT - ASSESSMENT
Patient is a 84 year old male with PMHX of DM, HTN, HLD, Pagets, KACI, ESRD or HD M-W-F who's last HD session was Friday He was scheduled for HD today. While the nurse was evaluating him he was noted to be bradycardic in the 30s and HD was canceled and he was sent to the ED for further evaluation. He denies any chest pain, SOB, orthopnea pnd, palpitations, dizziness or syncope. He was treated for COVID-19 in April 2020 however PCR 6/8/20 is negative. Troponin is 0.007, BNP 6k. Initial EKG Revealed junctional bradycardia in the 30s. K level was 6.9. He was given Insulin/D50, Calcium gluconate and Atropine and improved to the 50s. He is feeling well. HD has been arranged for and he will have a session this evening. He is being admitted to [facility].    Impression  1. ESRD HD M-W-F  2. Hyperkalemia-->6.9  3. Junctional bradycardia in the 30s  4. Covid-19 in April, PCR negative 6/8/20  5. HTN  6. DM  7. KACI  8. Troponin 0.007 likely 2/2 ESRD  9. Elevated BNP but not in decompensated CHF    Reccomendations  1. Urgent HD tononight  2. Follow K  3. Trend Trop  4. Repeat BMP  5. DVT prophylaxis  6. Atropine at bed side  7. Cont Doxazosin  8. Cont Statin  9. Cont ASA Patient is a 84 year old male with PMHX of DM, HTN, HLD, Pagets, KACI, ESRD or HD M-W-F who's last HD session was Friday He was scheduled for HD today. While the nurse was evaluating him he was noted to be bradycardic in the 30s and HD was canceled and he was sent to the ED for further evaluation. He denies any chest pain, SOB, orthopnea pnd, palpitations, dizziness or syncope. He was treated for COVID-19 in April 2020 however PCR 6/8/20 is negative. Troponin is 0.007, BNP 6k. Initial EKG Revealed junctional bradycardia in the 30s. K level was 6.9. He was given Insulin/D50, Calcium gluconate and improved to the 50s. He is feeling well. HD has been arranged for and he will have a session this evening. He is being admitted to [facility].    Impression  1. ESRD HD M-W-F  2. Hyperkalemia-->6.9  3. Junctional bradycardia in the 30s  4. Covid-19 in April, PCR negative 6/8/20  5. HTN  6. DM  7. KACI  8. Troponin 0.007 likely 2/2 ESRD  9. Elevated BNP but not in decompensated CHF    Reccomendations  1. Urgent HD tononight  2. Follow K  3. Trend Trop  4. Repeat BMP  5. DVT prophylaxis  6. Atropine at bed side  7. Cont Doxazosin  8. Cont Statin  9. Cont ASA

## 2020-06-08 NOTE — ED ADULT NURSE NOTE - OBJECTIVE STATEMENT
pt with low HR in 30's at the dialysis center. pt is asymptomatic. pt on dialysis M-W-F. last dialysis this past Friday. pt appears comfortable, in NAD. Blood sent to lab, pt medicated as ordered. Pt awaiting further eval. Pt on CM with sinus tom, will continue to monitor.

## 2020-06-08 NOTE — ED PROVIDER NOTE - DIAGNOSTIC INTERPRETATION
ER Physician: Nils Cleveland  CHEST XRAY INTERPRETATION: lungs clear, heart shadow normal, bony structures intact

## 2020-06-08 NOTE — CONSULT NOTE ADULT - SUBJECTIVE AND OBJECTIVE BOX
Patient is a 84y old  Male who presents with a chief complaint of bradycardia.    Nephrology consulted for ESRD on HD, hyperkalemia associated w sinus bradycardia requiring dialysis.    HPI:  83 yo M w PMHx of DM, HTN, ESRD on home HD MWF via [facility] (last HD on 6/5, 3.5 hr) was noted by HD nurse to have bradycardia and was sent to urgent care where EKG was done, showing sinus bradycardia at 33/ min and sent to the hospital. Denies lightheadedness, HA, blurry vision, sob, cp, n/v/d.   In ED, for hyperkalemia received: ca gluconate 1g IVP x2, R insulin 5u w dextrose x1, Na bicarb 50 meq IVP x1, lokelma 10 g po x1.    PAST MEDICAL & SURGICAL HISTORY:  Gout  HLD (hyperlipidemia)  HTN (hypertension)  CKD (chronic kidney disease)  DM (diabetes mellitus)      Allergies    penicillin (Unknown)  sulfa drugs (Unknown)    Intolerances        FAMILY HISTORY:  not contributory    SOCIAL HISTORY:  denies tobacco     MEDICATIONS  (STANDING):  albumin human 25% IVPB 50 milliLiter(s) IV Intermittent every 1 hour  aspirin enteric coated 81 milliGRAM(s) Oral daily  atorvastatin 80 milliGRAM(s) Oral at bedtime  atropine Injectable 1 milliGRAM(s) IV Push once  doxazosin 8 milliGRAM(s) Oral at bedtime  montelukast 10 milliGRAM(s) Oral every 24 hours  sevelamer carbonate 1600 milliGRAM(s) Oral three times a day with meals    MEDICATIONS  (PRN):      Vital Signs Last 24 Hrs  T(C): 36.7 (08 Jun 2020 19:21), Max: 36.7 (08 Jun 2020 19:21)  T(F): 98 (08 Jun 2020 19:21), Max: 98 (08 Jun 2020 19:21)  HR: 59 (08 Jun 2020 19:59) (51 - 59)  BP: 152/80 (08 Jun 2020 19:59) (149/69 - 152/80)  BP(mean): --  RR: 18 (08 Jun 2020 19:21) (18 - 18)  SpO2: 96% (08 Jun 2020 19:21) (96% - 96%)    REVIEW OF SYSTEMS:  Gen: No fever  CVS: No chest pain  Resp: No shortness of breath  Abd: No nausea/ No vomiting/No abdominal pain  CNS: No headache      PHYSICAL EXAM:  GENERAL: alert, no acute distress at present  NECK: supple, No JVD  CHEST/LUNG: decreased breath sounds bilaterally  HEART: normal S1S2, RRR  ABDOMEN: Soft, Nontender, +BS, No flank tenderness  EXTREMITIES: No LE edema bilateral  Neurology: AAOx3, no focal neurological deficit  SKIN: No rashes  ACCESS: [facility] cath, site dry and clean       CAPILLARY BLOOD GLUCOSE          I&O's Summary        LABS:                            8.4    5.81  )-----------( 152      ( 08 Jun 2020 19:46 )             27.0       PT/INR - ( 08 Jun 2020 19:46 )   PT: 11.6 sec;   INR: 1.02          PTT - ( 08 Jun 2020 19:46 )  PTT:37.1 sec  CARDIAC MARKERS ( 08 Jun 2020 19:46 )  x     / 0.07 ng/mL / 56 U/L / x     / x              RADIOLOGY & ADDITIONAL TESTS:    reviewed

## 2020-06-08 NOTE — H&P ADULT - NSICDXPASTMEDICALHX_GEN_ALL_CORE_FT
PAST MEDICAL HISTORY:  CKD (chronic kidney disease)     DM (diabetes mellitus)     Gout     HLD (hyperlipidemia)     HTN (hypertension)

## 2020-06-08 NOTE — ED PROVIDER NOTE - OBJECTIVE STATEMENT
84 M w bradycardia- px w hx of DM, CKD on HD - M-w-f- last HD was Friday- HD nurse noted Bradycardia and sent px to [facility]- [facility] did ekg- sb at 33 and sent to hospital  px w no sx no cp/sob/dizziness/lh  px had covid in April- has recovered no f/c  exac- Renal Failure  allev- none  severe

## 2020-06-08 NOTE — ED PROVIDER NOTE - CLINICAL SUMMARY MEDICAL DECISION MAKING FREE TEXT BOX
junctional tom w episodes of ST- strongly suspected hyperK- temporizing measures given immediately  K- 6.9- Nephro will come in for urgent HD_ accepted by Cards tele for monitoring

## 2020-06-08 NOTE — ED ADULT TRIAGE NOTE - CHIEF COMPLAINT QUOTE
Patient biba. Per EMS, patient unable to receive dialysis today due to bradycardia. Patient dialysis schedule M, W, F.

## 2020-06-08 NOTE — CONSULT NOTE ADULT - ASSESSMENT
83 yo M w PMHx of DM, HTN, ESRD on home HD MWF via [facility] (last HD on 6/5, 3.5hr) admitted for hyperkalemia associated w sinus bradycardia requiring dialysis, hence Nephrology consulted.      # ESRD on home HD MWF via [facility]   - last HD on 6/5, full session of 3.5hr   - EDW TBD  - getting urgent HD today for hyperkalemia associated w sinus bradycardia (2.5hr, 2K bath, UF 1.5 - 2.0 L)  - for hyperkalemia received in ED: ca gluconate 1g IVP x2, R insulin 5u w dextrose x1, Na bicarb 50 meq IVP x1, lokelma 10 g po x1  - will reassess for extra HD session tomorrow, 6/9   - daily weights  - renal diet  - strict I/O    # HTN  - UF with HD    # Renal bone disease  - sevelamer 1600 mg po tid w each meal   - check phos  - PTH 37 on 4/20/20, noted     # Anemia  - Hb 8.4 g/dl  - send iron panel  - transfusion as indicated     Discussed w attending Dr Blankenship.

## 2020-06-08 NOTE — ED PROVIDER NOTE - CRITICAL CARE PROVIDED
additional history taking/interpretation of diagnostic studies/consultation with other physicians/documentation/consult w/ pt's family directly relating to pts condition/direct patient care (not related to procedure)/conducted a detailed discussion of DNR status

## 2020-06-08 NOTE — ED ADULT NURSE NOTE - CAS TRG GENERAL AIRWAY, MLM
Vaccine Information Sheet, \"Influenza - Inactivated\"  given to Malachi Minor, or parent/legal guardian of  Malachi Minor and verbalized understanding. Patient responses:    Have you ever had a reaction to a flu vaccine? No  Are you able to eat eggs without adverse effects? Yes  Do you have any current illness? No  Have you ever had Guillian Etta Syndrome? No    Immunizations Administered     Name Date Dose Route    Influenza, Quadv, IM, PF (6 mo and older Fluzone, Flulaval, Fluarix, and 3 yrs and older Afluria) 9/20/2019 0.5 mL Intramuscular    Site: Deltoid- Left    Lot: [id number]          Flu vaccine given per order. Please see immunization tab. Patient instructed to remain in clinic for 20 minutes after injection and was advised to report any adverse reaction to me immediately.
Not on file     Gets together: Not on file     Attends Gnosticist service: Not on file     Active member of club or organization: Not on file     Attends meetings of clubs or organizations: Not on file     Relationship status: Not on file    Intimate partner violence:     Fear of current or ex partner: Not on file     Emotionally abused: Not on file     Physically abused: Not on file     Forced sexual activity: Not on file   Other Topics Concern    Not on file   Social History Narrative    Not on file     Current Outpatient Medications   Medication Sig Dispense Refill    traMADol (ULTRAM ER) 300 MG extended release tablet TAKE 1 TABLET BY MOUTH DAILY 30 tablet 0     No current facility-administered medications for this visit. No Known Allergies        Objective:   Physical Exam   Vitals reviewed. Constitutional: He is oriented to person, place, and time. He appears well-developed and well-nourished. HENT:   Mouth/Throat: Oropharynx is clear and moist.   Eyes: Conjunctivae are normal. No scleral icterus. Neck: Normal range of motion. Neck supple. No JVD present. Carotid bruit is not present. No thyromegaly present. Cardiovascular: Normal rate, regular rhythm, normal heart sounds and intact distal pulses. No murmur heard. Pulmonary/Chest: Effort normal and breath sounds normal. No respiratory distress. He has no wheezes. He has no rales. He exhibits no tenderness. Abdominal: Soft. Bowel sounds are normal. He exhibits no distension and no mass. No tenderness. He has no rebound and no guarding. Neurological: He is alert and oriented to person, place, and time. Skin: No rash noted. Back, decrease range of motion, no acute symptoms     Assessment:      Diagnosis Orders   1. Chronic bilateral low back pain without sciatica  LIPID PANEL    BASIC METABOLIC PANEL    CBC    HEPATIC FUNCTION PANEL    traMADol (ULTRAM ER) 300 MG extended release tablet   2.  Need for influenza vaccination  INFLUENZA,
Patent

## 2020-06-08 NOTE — H&P ADULT - NSHPLABSRESULTS_GEN_ALL_CORE
Troponin T, Serum (06.08.20 @ 19:46)    Troponin T, Serum: 0.07: TYPE:(C=Critical, N=Notification, A=Abnormal) C  TESTS: _K  DATE/TIME CALLED: _06/08/20 20:16  CALLED TO: JOSE SALES  READ BACK (2 Patient Identifiers)(Y/N): _Y  READ BACK VALUES (Y/N): _Y  CALLED BY: _TIM  Reference interval for troponin T is </= 0.01 ng/mL which includes the  99th percentile of a healthy population. Troponin T results are not  interchangeable with troponin I results. ng/mL

## 2020-06-08 NOTE — H&P ADULT - HISTORY OF PRESENT ILLNESS
Patient is a 84 year old male with PMHX of DM, HTN, HLD, Pagets, KACI, ESRD or HD M-W-F who's last HD session was Friday He was scheduled for HD today. While the nurse was evaluating him he was noted to be bradycardic in the 30s and HD was canceled and he was sent to the ED for further evaluation. He denies any chest pain, SOB, orthopnea pnd, palpitations, dizziness or syncope. He was treated for COVID-19 in April 2020 however PCR 6/8/20 is negative. Troponin is 0.007, BNP 6k. Initial EKG Revealed junctional bradycardia in the 30s. He was given Insulin/D50, Calcium gluconate and Atropine and improved to the 50s. He is feeling well. HD has been arranged for and he will have a session this evening. He is being admitted to [facility]. Patient is a 84 year old male with PMHX of DM, HTN, HLD, Pagets, KACI, ESRD or HD M-W-F who's last HD session was Friday He was scheduled for HD today. While the nurse was evaluating him he was noted to be bradycardic in the 30s and HD was canceled and he was sent to the ED for further evaluation. He denies any chest pain, SOB, orthopnea pnd, palpitations, dizziness or syncope. He was treated for COVID-19 in April 2020 however PCR 6/8/20 is negative. Troponin is 0.007, BNP 6k. Initial EKG Revealed junctional bradycardia in the 30s. He was given Insulin/D50, Calcium gluconate and improved to the 50s. He is feeling well. HD has been arranged for and he will have a session this evening. He is being admitted to [facility].

## 2020-06-08 NOTE — PROGRESS NOTE ADULT - SUBJECTIVE AND OBJECTIVE BOX
Patient was seen and evaluated on dialysis.   HR: 51 (06-08-20 @ 21:47)  BP: 149/69 (06-08-20 @ 21:47)  Wt(kg): --  06-08    142  |  107  |  75<H>  ----------------------------<  135<H>  6.9<HH>   |  22  |  6.38<H>    Ca    9.6      08 Jun 2020 19:46    TPro  6.1  /  Alb  3.8  /  TBili  0.3  /  DBili  x   /  AST  11  /  ALT  15  /  AlkPhos  61  06-08    Continue dialysis:   Dialyzer:   180    QB: 400  K bath: 2  Goal UF:    1.5 - 2.0     L   over       150        min  Patient is tolerating the procedure well.   Continue full treatment as prescribed.

## 2020-06-09 ENCOUNTER — TRANSCRIPTION ENCOUNTER (OUTPATIENT)
Age: 85
End: 2020-06-09

## 2020-06-09 VITALS — TEMPERATURE: 98 F

## 2020-06-09 LAB
A1C WITH ESTIMATED AVERAGE GLUCOSE RESULT: 5 % — SIGNIFICANT CHANGE UP (ref 4–5.6)
ANION GAP SERPL CALC-SCNC: 11 MMOL/L — SIGNIFICANT CHANGE UP (ref 5–17)
ANION GAP SERPL CALC-SCNC: 13 MMOL/L — SIGNIFICANT CHANGE UP (ref 5–17)
BUN SERPL-MCNC: 42 MG/DL — HIGH (ref 7–23)
BUN SERPL-MCNC: 45 MG/DL — HIGH (ref 7–23)
CALCIUM SERPL-MCNC: 9.3 MG/DL — SIGNIFICANT CHANGE UP (ref 8.4–10.5)
CALCIUM SERPL-MCNC: 9.3 MG/DL — SIGNIFICANT CHANGE UP (ref 8.4–10.5)
CHLORIDE SERPL-SCNC: 102 MMOL/L — SIGNIFICANT CHANGE UP (ref 96–108)
CHLORIDE SERPL-SCNC: 103 MMOL/L — SIGNIFICANT CHANGE UP (ref 96–108)
CO2 SERPL-SCNC: 26 MMOL/L — SIGNIFICANT CHANGE UP (ref 22–31)
CO2 SERPL-SCNC: 27 MMOL/L — SIGNIFICANT CHANGE UP (ref 22–31)
CREAT SERPL-MCNC: 4.24 MG/DL — HIGH (ref 0.5–1.3)
CREAT SERPL-MCNC: 4.58 MG/DL — HIGH (ref 0.5–1.3)
ESTIMATED AVERAGE GLUCOSE: 97 MG/DL — SIGNIFICANT CHANGE UP (ref 68–114)
GLUCOSE BLDC GLUCOMTR-MCNC: 115 MG/DL — HIGH (ref 70–99)
GLUCOSE BLDC GLUCOMTR-MCNC: 66 MG/DL — LOW (ref 70–99)
GLUCOSE BLDC GLUCOMTR-MCNC: 96 MG/DL — SIGNIFICANT CHANGE UP (ref 70–99)
GLUCOSE SERPL-MCNC: 66 MG/DL — LOW (ref 70–99)
GLUCOSE SERPL-MCNC: 71 MG/DL — SIGNIFICANT CHANGE UP (ref 70–99)
HCT VFR BLD CALC: 26.1 % — LOW (ref 39–50)
HGB BLD-MCNC: 8.4 G/DL — LOW (ref 13–17)
MCHC RBC-ENTMCNC: 32.2 GM/DL — SIGNIFICANT CHANGE UP (ref 32–36)
MCHC RBC-ENTMCNC: 33.3 PG — SIGNIFICANT CHANGE UP (ref 27–34)
MCV RBC AUTO: 103.6 FL — HIGH (ref 80–100)
NRBC # BLD: 0 /100 WBCS — SIGNIFICANT CHANGE UP (ref 0–0)
PLATELET # BLD AUTO: 141 K/UL — LOW (ref 150–400)
POTASSIUM SERPL-MCNC: 4.7 MMOL/L — SIGNIFICANT CHANGE UP (ref 3.5–5.3)
POTASSIUM SERPL-MCNC: 4.8 MMOL/L — SIGNIFICANT CHANGE UP (ref 3.5–5.3)
POTASSIUM SERPL-SCNC: 4.7 MMOL/L — SIGNIFICANT CHANGE UP (ref 3.5–5.3)
POTASSIUM SERPL-SCNC: 4.8 MMOL/L — SIGNIFICANT CHANGE UP (ref 3.5–5.3)
RBC # BLD: 2.52 M/UL — LOW (ref 4.2–5.8)
RBC # FLD: 14.8 % — HIGH (ref 10.3–14.5)
SODIUM SERPL-SCNC: 141 MMOL/L — SIGNIFICANT CHANGE UP (ref 135–145)
SODIUM SERPL-SCNC: 141 MMOL/L — SIGNIFICANT CHANGE UP (ref 135–145)
TROPONIN T SERPL-MCNC: 0.07 NG/ML — CRITICAL HIGH (ref 0–0.01)
TROPONIN T SERPL-MCNC: 0.08 NG/ML — CRITICAL HIGH (ref 0–0.01)
WBC # BLD: 4.83 K/UL — SIGNIFICANT CHANGE UP (ref 3.8–10.5)
WBC # FLD AUTO: 4.83 K/UL — SIGNIFICANT CHANGE UP (ref 3.8–10.5)

## 2020-06-09 PROCEDURE — 85610 PROTHROMBIN TIME: CPT

## 2020-06-09 PROCEDURE — 84484 ASSAY OF TROPONIN QUANT: CPT

## 2020-06-09 PROCEDURE — 83880 ASSAY OF NATRIURETIC PEPTIDE: CPT

## 2020-06-09 PROCEDURE — 85025 COMPLETE CBC W/AUTO DIFF WBC: CPT

## 2020-06-09 PROCEDURE — 82962 GLUCOSE BLOOD TEST: CPT

## 2020-06-09 PROCEDURE — 83036 HEMOGLOBIN GLYCOSYLATED A1C: CPT

## 2020-06-09 PROCEDURE — 82550 ASSAY OF CK (CPK): CPT

## 2020-06-09 PROCEDURE — 99239 HOSP IP/OBS DSCHRG MGMT >30: CPT

## 2020-06-09 PROCEDURE — 36415 COLL VENOUS BLD VENIPUNCTURE: CPT

## 2020-06-09 PROCEDURE — 71045 X-RAY EXAM CHEST 1 VIEW: CPT

## 2020-06-09 PROCEDURE — 85730 THROMBOPLASTIN TIME PARTIAL: CPT

## 2020-06-09 PROCEDURE — 87635 SARS-COV-2 COVID-19 AMP PRB: CPT

## 2020-06-09 PROCEDURE — 86140 C-REACTIVE PROTEIN: CPT

## 2020-06-09 PROCEDURE — 96375 TX/PRO/DX INJ NEW DRUG ADDON: CPT

## 2020-06-09 PROCEDURE — 93005 ELECTROCARDIOGRAM TRACING: CPT

## 2020-06-09 PROCEDURE — 86706 HEP B SURFACE ANTIBODY: CPT

## 2020-06-09 PROCEDURE — 86803 HEPATITIS C AB TEST: CPT

## 2020-06-09 PROCEDURE — 85027 COMPLETE CBC AUTOMATED: CPT

## 2020-06-09 PROCEDURE — 80053 COMPREHEN METABOLIC PANEL: CPT

## 2020-06-09 PROCEDURE — 99214 OFFICE O/P EST MOD 30 MIN: CPT

## 2020-06-09 PROCEDURE — 87340 HEPATITIS B SURFACE AG IA: CPT

## 2020-06-09 PROCEDURE — 90935 HEMODIALYSIS ONE EVALUATION: CPT

## 2020-06-09 PROCEDURE — 96365 THER/PROPH/DIAG IV INF INIT: CPT

## 2020-06-09 PROCEDURE — 99232 SBSQ HOSP IP/OBS MODERATE 35: CPT

## 2020-06-09 PROCEDURE — 80048 BASIC METABOLIC PNL TOTAL CA: CPT

## 2020-06-09 PROCEDURE — 99285 EMERGENCY DEPT VISIT HI MDM: CPT | Mod: 25

## 2020-06-09 RX ORDER — DOXAZOSIN MESYLATE 4 MG
1 TABLET ORAL
Qty: 0 | Refills: 0 | DISCHARGE
Start: 2020-06-09

## 2020-06-09 RX ADMIN — SEVELAMER CARBONATE 1600 MILLIGRAM(S): 2400 POWDER, FOR SUSPENSION ORAL at 11:54

## 2020-06-09 RX ADMIN — SEVELAMER CARBONATE 1600 MILLIGRAM(S): 2400 POWDER, FOR SUSPENSION ORAL at 07:36

## 2020-06-09 RX ADMIN — Medication 81 MILLIGRAM(S): at 11:54

## 2020-06-09 RX ADMIN — HEPARIN SODIUM 5000 UNIT(S): 5000 INJECTION INTRAVENOUS; SUBCUTANEOUS at 06:44

## 2020-06-09 NOTE — PROGRESS NOTE ADULT - ATTENDING COMMENTS
volume, lytes ok post HD yesterday  unclear cause of hyperkalemia-- reviewed diet and HD compliance  next HD tomorrow--as outpt if dcd

## 2020-06-09 NOTE — DISCHARGE NOTE PROVIDER - INSTRUCTIONS
- PLEASE AVOID FOODS HIGH IN POTASSIUM. These foods include: Bananas, oranges, cantaloupe, honeydew, apricots, grapefruit (some dried fruits, such as prunes, raisins, and dates, are also high in potassium), Cooked spinach, Cooked broccoli, Potatoes, Sweet potatoes, Mushrooms,, Peas, Cucumbers, Zucchini, Eggplant, Pumpkins,  and Leafy greens  - Foods LOW in potassium include: Rice, pasta, bread and some vegetables.

## 2020-06-09 NOTE — DISCHARGE NOTE PROVIDER - CARE PROVIDER_API CALL
Meera Tamayo  EndocrinologyMetabDiabetes  [address] [place] [address]  Phone: [phone]  Fax: [phone]  Scheduled Appointment: 06/18/2020 01:40 PM

## 2020-06-09 NOTE — PROGRESS NOTE ADULT - ASSESSMENT
85 yo M w PMHx of DM, HTN, ESRD on home HD MWF via [facility] (last HD on 6/5, 3.5hr) admitted for hyperkalemia associated w sinus bradycardia requiring dialysis, hence Nephrology consulted.      # ESRD on home HD MWF via [facility]   - last HD on 6/9 overnight for k 6.9 - uf 1500mls  - repeat k 4.8  - volume and electrolytes acceptable  - no urgent indication for hd today  - next hd on 6/10  - daily weights  - renal diet  - strict I/O    # HTN  - UF with HD    # Renal bone disease  - sevelamer 1600 mg po tid w each meal   - check phos  - PTH 37 on 4/20/20, noted     # Anemia  - Hb 8.4 g/dl  - send iron panel  - transfusion as indicated

## 2020-06-09 NOTE — DISCHARGE NOTE PROVIDER - NSDCCPCAREPLAN_GEN_ALL_CORE_FT
PRINCIPAL DISCHARGE DIAGNOSIS  Diagnosis: Bradycardia  Assessment and Plan of Treatment: - When you presented to the hospital your heart rate was LOW. This was related to your elevated potassium levels as your heart rate went back up once your potassium level went down.   - PLEASE AVOID FOODS HIGH IN POTASSIUM. These foods include: Bananas, oranges, cantaloupe, honeydew, apricots, grapefruit (some dried fruits, such as prunes, raisins, and dates, are also high in potassium), Cooked spinach, Cooked broccoli, Potatoes, Sweet potatoes, Mushrooms,, Peas, Cucumbers, Zucchini, Eggplant, Pumpkins,  and Leafy greens  - Foods LOW in potassium include: Rice, pasta, bread and some vegetables.      SECONDARY DISCHARGE DIAGNOSES  Diagnosis: Hyperkalemia  Assessment and Plan of Treatment: - When you presented to the hospital your potassium level was very HIGH. Your potassium level went down after you received medications intravenously (through your IV) and had a dialysis session. Symtpoms that can develop with an elevated potassium level include: feeling tired or weak, feeling sick to the stomach (nausea), muscle pains or cramps, trouble breathing, unusual heartbeats or chest pains.   - Please continue your dialysis as scheduled on M-W-F.   - PLEASE AVOID FOODS HIGH IN POTASSIUM. These foods include: Bananas, oranges, cantaloupe, honeydew, apricots, grapefruit (some dried fruits, such as prunes, raisins, and dates, are also high in potassium), Cooked spinach, Cooked broccoli, Potatoes, Sweet potatoes, Mushrooms,, Peas, Cucumbers, Zucchini, Eggplant, Pumpkins,  and Leafy greens

## 2020-06-09 NOTE — DISCHARGE NOTE PROVIDER - NSDCFUSCHEDAPPT_GEN_ALL_CORE_FT
JOSHUA ALBRIGHT ; 06/18/2020 ; [facility] [address] JOSHUA ALBRIGHT ; 06/18/2020 ; [facility] [address]

## 2020-06-09 NOTE — DISCHARGE NOTE NURSING/CASE MANAGEMENT/SOCIAL WORK - PATIENT PORTAL LINK FT
You can access the FollowMyHealth Patient Portal offered by [facility] by registering at the following website: [email or web address]. By joining [facility]’s FollowMyHealth portal, you will also be able to view your health information using other applications (apps) compatible with our system.

## 2020-06-09 NOTE — PROGRESS NOTE ADULT - SUBJECTIVE AND OBJECTIVE BOX
Patient is a 84y Male seen and evaluated at bedside.   No new complaints.  Vitals, meds, labs reviewed.  No h/o missed hd sessions  Patient was dialyzed overnight for hyperkalemia 6.9 - k now 4.8.      Meds:    albumin human 25% IVPB 50 every 1 hour  aspirin enteric coated 81 daily  atorvastatin 80 at bedtime  atropine Syringe 1 once PRN  dextrose 40% Gel 15 once PRN  dextrose 5%. 1000 <Continuous>  dextrose 50% Injectable 12.5 once  dextrose 50% Injectable 25 once  dextrose 50% Injectable 25 once  doxazosin 8 at bedtime  glucagon  Injectable 1 once PRN  heparin   Injectable 5000 every 8 hours  insulin glargine Injectable (LANTUS) 16 at bedtime  insulin lispro (HumaLOG) corrective regimen sliding scale  Before meals and at bedtime  montelukast 10 every 24 hours  sevelamer carbonate 1600 three times a day with meals      Allergies    penicillin (Unknown)  sulfa drugs (Unknown)    Intolerances        T(C): , Max: 36.9 (06-09-20 @ 05:00)  T(F): , Max: 98.5 (06-09-20 @ 05:00)  HR: 67 (06-09-20 @ 13:44)  BP: 129/62 (06-09-20 @ 13:44)  BP(mean): 89 (06-09-20 @ 13:44)  RR: 16 (06-09-20 @ 13:44)  SpO2: 97% (06-09-20 @ 13:44)  Wt(kg): --    06-08 @ 07:01  -  06-09 @ 07:00  --------------------------------------------------------  IN: 0 mL / OUT: 1500 mL / NET: -1500 mL    06-09 @ 07:01  -  06-09 @ 14:49  --------------------------------------------------------  IN: 360 mL / OUT: 0 mL / NET: 360 mL      Height (cm): 167.64 (06-08 @ 19:21)  Weight (kg): 87.5 (06-08 @ 19:21)  BMI (kg/m2): 31.1 (06-08 @ 19:21)  BSA (m2): 1.97 (06-08 @ 19:21)    Review of Systems:  CONSTITUTIONAL: No fever or chills, No fatigue or tiredness.  EYES: No blurred or double vision.  RESPIRATORY: No shortness of breath, cough, hemoptysis  CARDIOVASCULAR: No Chest pain or shortness of breath  GASTROINTESTINAL: NO abdominal or flank pain, No nausea or vomiting, No diarrhea  GENITOURINARY: No dysuria or urinary burning, No difficulty passing urine, No hematuria  NEUROLOGICAL: No headaches or blurred vision  SKIN: No skin rashes   MUSCULOSKELETAL: No arthralgia, Joint pain, leg edema, No muscle pains      PHYSICAL EXAM:  GENERAL: NAD, well-developed, well nourished, alert, awake, no acute distress at present  NECK: Neck supple, No JVD  CHEST/LUNG: Clear to auscultation bilaterally; No wheeze, no rales, no crepitations  HEART: Regular rate and rhythm. S1S2+  No gallop, no rub   ABDOMEN: Soft, Nontender, BS+nt, No flank tenderness.   EXTREMITIES: No clubbing, cyanosis, or edema  Neurology: AAOx3, no focal neurological deficit      ACCESS: R IJ THC cath, site dry and clean           LABS:                        8.4    4.83  )-----------( 141      ( 09 Jun 2020 07:07 )             26.1     06-09    141  |  103  |  45<H>  ----------------------------<  71  4.8   |  27  |  4.58<H>    Ca    9.3      09 Jun 2020 07:07    TPro  6.1  /  Alb  3.8  /  TBili  0.3  /  DBili  x   /  AST  11  /  ALT  15  /  AlkPhos  61  06-08    Hepatitis B Surface Antibody: Nonreact (06-08 @ 22:22)  Hepatitis C Virus S/CO Ratio: 0.06 S/CO (06-08 @ 22:22)    PT/INR - ( 08 Jun 2020 19:46 )   PT: 11.6 sec;   INR: 1.02          PTT - ( 08 Jun 2020 19:46 )  PTT:37.1 sec          RADIOLOGY & ADDITIONAL STUDIES:

## 2020-06-09 NOTE — DISCHARGE NOTE PROVIDER - HOSPITAL COURSE
84 y/oM PMHX DM, HTN, HLD, Pagets, KACI, ESRD o HD M-W-F (last session 6/5/20). He was scheduled for dialysis on 6/8 when nurse noted him to be bradycardic to the 30s and HD was canceled and he was sent to the ED. In the ED, COVID neg, trop 0.007, BNP 6K and initial     EKG revealed junctional bradycardia 30s (he was asx). K 6.9 and he was given insulin/D50, Calcium gluconate. His HR improved to the 50s and he was admitted to [facility] for further mgmt.         During hospitalization, he received dialysis on the evening of 6/8/20. S/P dialysis his K improved and was 4.8 on the morning of 6/9/20. Per renal/Dr. Blankenship, he does not need additional dialysis prior to being discharged home today and will resume his normal schedule of MWF. In terms of his bradycardia, his HR has remained in the 50s-60s without ectopy and has not been in junctional rhythm since he was admitted in the ED. EP was consulted and has patient had asymptomatic bradycardia which resolved with the resolution of his K levels, no intervention is necessary and will require close monitoring of his K levels.         On the day of discharge, the patient was seen and examined. Symptoms improved. Vital signs are stable. Labs and imaging reviewed. Patient is medically optimized and hemodynamically stable. Return precautions discussed, medication teach back done w/ patient and his significant other and importance of physician followup emphasized for which they verbalized understanding. 84 y/oM PMHX DM, HTN, HLD, Pagets, KACI, ESRD o HD M-W-F (last session 6/5/20). He was scheduled for dialysis on 6/8 when nurse noted him to be bradycardic to the 30s and HD was canceled and he was sent to the ED. In the ED, COVID neg, trop 0.007, BNP 6K and initial EKG revealed junctional bradycardia 30s (he was asx). K 6.9 and he was given insulin/D50, Calcium gluconate. His HR improved to the 50s and he was admitted to [facility] for further mgmt.         During hospitalization, he received dialysis on the evening of 6/8/20. S/P dialysis his K improved and was 4.8 on the morning of 6/9/20. Per renal/Dr. Blankenship, he does not need additional dialysis prior to being discharged home today and will resume his normal schedule of MWF. In terms of his bradycardia, his HR has remained in the 50s-60s without ectopy and has not been in junctional rhythm since he was admitted in the ED. EP was consulted and has patient had asymptomatic bradycardia which resolved with the resolution of his K levels, no intervention is necessary and will require close monitoring of his K levels.         On the day of discharge, the patient was seen and examined. Symptoms improved. Vital signs are stable. Labs and imaging reviewed. Patient is medically optimized and hemodynamically stable. Return precautions discussed, medication teach back done w/ patient and his significant other and importance of physician followup emphasized for which they verbalized understanding.

## 2020-06-09 NOTE — CONSULT NOTE ADULT - SUBJECTIVE AND OBJECTIVE BOX
HPI:  84 year old male with DM, HTN, HLD, Pagets, KACI, ESRD or HD M-W-F who's last HD session was Friday.  He was scheduled for HD 6/8 but was found to be tom in the 30s and was sent to [facility].  EKG in the ER shows sinus tom at 33bpm and potassium was 6.9.  He was given Insulin/D50, Calcium gluconate and improved to the 50s.  He was asymptomatic during this event.  He denies any history of syncope or near syncope.   No history of arrhythmias.     PAST MEDICAL & SURGICAL HISTORY:  Gout  HLD (hyperlipidemia)  HTN (hypertension)  CKD (chronic kidney disease)  DM (diabetes mellitus)  No significant past surgical history      No pertinent family history in first degree relatives      Social History:no smoking, no drugs       Inpatient Medications:   albumin human 25% IVPB 50 milliLiter(s) IV Intermittent every 1 hour  aspirin enteric coated 81 milliGRAM(s) Oral daily  atorvastatin 80 milliGRAM(s) Oral at bedtime  doxazosin 8 milliGRAM(s) Oral at bedtime  heparin   Injectable 5000 Unit(s) SubCutaneous every 8 hours  insulin glargine Injectable (LANTUS) 16 Unit(s) SubCutaneous at bedtime  insulin lispro (HumaLOG) corrective regimen sliding scale   SubCutaneous Before meals and at bedtime  montelukast 10 milliGRAM(s) Oral every 24 hours  sevelamer carbonate 1600 milliGRAM(s) Oral three times a day with meals      Allergies:   penicillin (Unknown)  sulfa drugs (Unknown)      ROS:   CONSTITUTIONAL: No fever, weight loss   EYES: Pt denies  RESPIRATORY: No cough, wheezing, chills or hemoptysis; No Shortness of Breath  CARDIOVASCULAR: see HPI  GASTROINTESTINAL: Pt denies  NEUROLOGICAL: Pt denies  SKIN: Pt denies   PSYCHIATRIC: Pt denies  HEME/LYMPH: Pt denies    PHYSICAL:  T(C): 36.1 (06-09-20 @ 09:53), Max: 36.9 (06-09-20 @ 05:00)  HR: 65 (06-09-20 @ 10:42) (51 - 67)  BP: 158/68 (06-09-20 @ 10:42) (134/60 - 179/77)  RR: 16 (06-09-20 @ 10:42) (12 - 19)  SpO2: 97% (06-09-20 @ 10:42) (95% - 98%)     Appearance: No acute distress, well developed  Eyes: normal appearing conjunctiva, pupils and eyelids  Cardiovascular: Normal S1 S2, No JVD   Respiratory: Lungs clear to auscultation   Gastrointestinal:  Soft, NT/ND 	  Neurologic:  No deficit noted  Psych: A&Ox3, normal mood/affect  Musculoskeletal: no deformities   Skin: no rash noted, normal color and pigmentation.        LABS:                        8.4    4.83  )-----------( 141      ( 09 Jun 2020 07:07 )             26.1     141  |  103  |  45<H>  ----------------------------<  71  4.8   |  27  |  4.58<H>    Ca    9.3       TPro  6.1  /  Alb  3.8  /  TBili  0.3  /  DBili  x   /  AST  11  /  ALT  15  /  AlkPhos  61     PT: 11.6 sec;   INR: 1.02   PTT:37.1 sec     EKG: in ER sinus tom in the 30s.  Other EKGs sinus at 58 with APCs    Telemetry: sinus 60-70    ECHO: none in chart    Assessment Plan:  84 year old male with DM, HTN, HLD, Pagets, KACI, ESRD or HD M-W-F who was found to be bradycardic to the 30s in the setting of a potassium of 6.9.  No lightheadedness or syncope.  Now potassium WNL and heart rate 60-80.  Nothing to do from an EP perspective - should have close monitoring of his potassium. HPI:  84 year old male with DM, HTN, HLD, Pagets, KACI, ESRD or HD M-W-F who's last HD session was Friday.  He was scheduled for HD 6/8 but was found to be tom in the 30s and was sent to [facility].  EKG in the ER shows sinus tom at 33bpm and potassium was 6.9.  He was given Insulin/D50, Calcium gluconate and improved to the 50s.  He was asymptomatic during this event.  He denies any history of syncope or near syncope.   No history of arrhythmias.     PAST MEDICAL & SURGICAL HISTORY:  Gout  HLD (hyperlipidemia)  HTN (hypertension)  CKD (chronic kidney disease)  DM (diabetes mellitus)  No significant past surgical history      No pertinent family history in first degree relatives      Social History:no smoking, no drugs       Inpatient Medications:   albumin human 25% IVPB 50 milliLiter(s) IV Intermittent every 1 hour  aspirin enteric coated 81 milliGRAM(s) Oral daily  atorvastatin 80 milliGRAM(s) Oral at bedtime  doxazosin 8 milliGRAM(s) Oral at bedtime  heparin   Injectable 5000 Unit(s) SubCutaneous every 8 hours  insulin glargine Injectable (LANTUS) 16 Unit(s) SubCutaneous at bedtime  insulin lispro (HumaLOG) corrective regimen sliding scale   SubCutaneous Before meals and at bedtime  montelukast 10 milliGRAM(s) Oral every 24 hours  sevelamer carbonate 1600 milliGRAM(s) Oral three times a day with meals      Allergies:   penicillin (Unknown)  sulfa drugs (Unknown)      ROS:   CONSTITUTIONAL: No fever, weight loss   EYES: Pt denies  RESPIRATORY: No cough, wheezing, chills or hemoptysis; No Shortness of Breath  CARDIOVASCULAR: see HPI  GASTROINTESTINAL: Pt denies  NEUROLOGICAL: Pt denies  SKIN: Pt denies   PSYCHIATRIC: Pt denies  HEME/LYMPH: Pt denies    PHYSICAL:  T(C): 36.1 (06-09-20 @ 09:53), Max: 36.9 (06-09-20 @ 05:00)  HR: 65 (06-09-20 @ 10:42) (51 - 67)  BP: 158/68 (06-09-20 @ 10:42) (134/60 - 179/77)  RR: 16 (06-09-20 @ 10:42) (12 - 19)  SpO2: 97% (06-09-20 @ 10:42) (95% - 98%)     Appearance: No acute distress, well developed  Eyes: normal appearing conjunctiva, pupils and eyelids  Cardiovascular: Normal S1 S2, No JVD   Respiratory: Lungs clear to auscultation   Gastrointestinal:  Soft, NT/ND 	  Neurologic:  No deficit noted  Psych: A&Ox3, normal mood/affect  Musculoskeletal: no deformities   Skin: no rash noted, normal color and pigmentation.        LABS:                        8.4    4.83  )-----------( 141      ( 09 Jun 2020 07:07 )             26.1     141  |  103  |  45<H>  ----------------------------<  71  4.8   |  27  |  4.58<H>    Ca    9.3       TPro  6.1  /  Alb  3.8  /  TBili  0.3  /  DBili  x   /  AST  11  /  ALT  15  /  AlkPhos  61     PT: 11.6 sec;   INR: 1.02   PTT:37.1 sec     EKG: in ER sinus tom in the 30s.  Other EKGs sinus at 58 with APCs    Telemetry: sinus 60-70    ECHO: none in chart    Assessment Plan:  84 year old male with DM, HTN, HLD, Pagets, KACI, ESRD or HD M-W-F who was found to be bradycardic to the 30s in the setting of a potassium of 6.9.  No lightheadedness or syncope.  Now potassium WNL and heart rate 60-80.  Nothing to do from an EP perspective - patient with asymptomatic bradycardia - should have close monitoring of his potassium.

## 2020-06-09 NOTE — DISCHARGE NOTE PROVIDER - NSDCMRMEDTOKEN_GEN_ALL_CORE_FT
aspirin 81 mg oral delayed release tablet: 1 tab(s) orally once a day  doxazosin 8 mg oral tablet: 1 tab(s) orally once a day (at bedtime)  Lantus 100 units/mL subcutaneous solution: 24 unit(s) subcutaneous once a day  montelukast 10 mg oral tablet: 1 tab(s) orally every 24 hours  Ozempic (1 mg dose) 2 mg/1.5 mL subcutaneous solution: 1 milligram(s) subcutaneous once a week  repaglinide 0.5 mg oral tablet: 1 tab(s) orally 3 times a day (before meals)  rosuvastatin 5 mg oral tablet: 1 tab(s) orally once a day  sevelamer carbonate 800 mg oral tablet: 2 tab(s) orally 3 times a day (with meals)  simethicone 80 mg oral tablet, chewable: 1 tab(s) orally 3 times a day, As needed, Gas aspirin 81 mg oral delayed release tablet: 1 tab(s) orally once a day  doxazosin 8 mg oral tablet: 1 tab(s) orally once a day (at bedtime)  montelukast 10 mg oral tablet: 1 tab(s) orally every 24 hours  Ozempic (1 mg dose) 2 mg/1.5 mL subcutaneous solution: 1 milligram(s) subcutaneous once a week  repaglinide 0.5 mg oral tablet: 1 tab(s) orally 3 times a day (before meals)  rosuvastatin 5 mg oral tablet: 1 tab(s) orally once a day  sevelamer carbonate 800 mg oral tablet: 2 tab(s) orally 3 times a day (with meals)  simethicone 80 mg oral tablet, chewable: 1 tab(s) orally 3 times a day, As needed, Gas

## 2020-06-12 DIAGNOSIS — R00.1 BRADYCARDIA, UNSPECIFIED: ICD-10-CM

## 2020-06-12 DIAGNOSIS — N25.0 RENAL OSTEODYSTROPHY: ICD-10-CM

## 2020-06-12 DIAGNOSIS — Z11.59 ENCOUNTER FOR SCREENING FOR OTHER VIRAL DISEASES: ICD-10-CM

## 2020-06-12 DIAGNOSIS — I12.0 HYPERTENSIVE CHRONIC KIDNEY DISEASE WITH STAGE 5 CHRONIC KIDNEY DISEASE OR END STAGE RENAL DISEASE: ICD-10-CM

## 2020-06-12 DIAGNOSIS — M10.9 GOUT, UNSPECIFIED: ICD-10-CM

## 2020-06-12 DIAGNOSIS — N18.6 END STAGE RENAL DISEASE: ICD-10-CM

## 2020-06-12 DIAGNOSIS — E87.5 HYPERKALEMIA: ICD-10-CM

## 2020-06-12 DIAGNOSIS — Z79.4 LONG TERM (CURRENT) USE OF INSULIN: ICD-10-CM

## 2020-06-12 DIAGNOSIS — E78.5 HYPERLIPIDEMIA, UNSPECIFIED: ICD-10-CM

## 2020-06-12 DIAGNOSIS — E11.22 TYPE 2 DIABETES MELLITUS WITH DIABETIC CHRONIC KIDNEY DISEASE: ICD-10-CM

## 2020-06-12 DIAGNOSIS — G47.33 OBSTRUCTIVE SLEEP APNEA (ADULT) (PEDIATRIC): ICD-10-CM

## 2020-06-12 DIAGNOSIS — D64.9 ANEMIA, UNSPECIFIED: ICD-10-CM

## 2020-06-12 DIAGNOSIS — Z99.2 DEPENDENCE ON RENAL DIALYSIS: ICD-10-CM

## 2020-06-12 DIAGNOSIS — Z79.82 LONG TERM (CURRENT) USE OF ASPIRIN: ICD-10-CM

## 2020-06-12 DIAGNOSIS — Z86.19 PERSONAL HISTORY OF OTHER INFECTIOUS AND PARASITIC DISEASES: ICD-10-CM

## 2020-06-15 ENCOUNTER — EMERGENCY (EMERGENCY)
Facility: HOSPITAL | Age: 85
LOS: 1 days | Discharge: HOME | End: 2020-06-15
Attending: EMERGENCY MEDICINE | Admitting: EMERGENCY MEDICINE
Payer: MEDICARE

## 2020-06-15 VITALS
RESPIRATION RATE: 18 BRPM | DIASTOLIC BLOOD PRESSURE: 94 MMHG | HEART RATE: 86 BPM | OXYGEN SATURATION: 95 % | TEMPERATURE: 98 F | SYSTOLIC BLOOD PRESSURE: 146 MMHG

## 2020-06-15 VITALS
HEIGHT: 66 IN | OXYGEN SATURATION: 94 % | DIASTOLIC BLOOD PRESSURE: 76 MMHG | WEIGHT: 190.04 LBS | TEMPERATURE: 99 F | HEART RATE: 84 BPM | SYSTOLIC BLOOD PRESSURE: 172 MMHG | RESPIRATION RATE: 18 BRPM

## 2020-06-15 DIAGNOSIS — Z20.828 CONTACT WITH AND (SUSPECTED) EXPOSURE TO OTHER VIRAL COMMUNICABLE DISEASES: ICD-10-CM

## 2020-06-15 DIAGNOSIS — Z88.2 ALLERGY STATUS TO SULFONAMIDES: ICD-10-CM

## 2020-06-15 DIAGNOSIS — Z88.0 ALLERGY STATUS TO PENICILLIN: ICD-10-CM

## 2020-06-15 DIAGNOSIS — R79.89 OTHER SPECIFIED ABNORMAL FINDINGS OF BLOOD CHEMISTRY: ICD-10-CM

## 2020-06-15 LAB
ALBUMIN SERPL ELPH-MCNC: 4.1 G/DL — SIGNIFICANT CHANGE UP (ref 3.3–5)
ALP SERPL-CCNC: 77 U/L — SIGNIFICANT CHANGE UP (ref 40–120)
ALT FLD-CCNC: <5 U/L — LOW (ref 10–45)
ANION GAP SERPL CALC-SCNC: 14 MMOL/L — SIGNIFICANT CHANGE UP (ref 5–17)
APTT BLD: 36.6 SEC — HIGH (ref 27.5–36.3)
AST SERPL-CCNC: <5 U/L — LOW (ref 10–40)
BASE EXCESS BLDV CALC-SCNC: 8.8 MMOL/L — SIGNIFICANT CHANGE UP
BASOPHILS # BLD AUTO: 0.04 K/UL — SIGNIFICANT CHANGE UP (ref 0–0.2)
BASOPHILS NFR BLD AUTO: 0.6 % — SIGNIFICANT CHANGE UP (ref 0–2)
BILIRUB SERPL-MCNC: 0.4 MG/DL — SIGNIFICANT CHANGE UP (ref 0.2–1.2)
BUN SERPL-MCNC: 34 MG/DL — HIGH (ref 7–23)
CA-I SERPL-SCNC: 1.13 MMOL/L — SIGNIFICANT CHANGE UP (ref 1.12–1.3)
CALCIUM SERPL-MCNC: 9.5 MG/DL — SIGNIFICANT CHANGE UP (ref 8.4–10.5)
CHLORIDE SERPL-SCNC: 101 MMOL/L — SIGNIFICANT CHANGE UP (ref 96–108)
CO2 SERPL-SCNC: 27 MMOL/L — SIGNIFICANT CHANGE UP (ref 22–31)
CREAT SERPL-MCNC: 3.38 MG/DL — HIGH (ref 0.5–1.3)
EOSINOPHIL # BLD AUTO: 0.11 K/UL — SIGNIFICANT CHANGE UP (ref 0–0.5)
EOSINOPHIL NFR BLD AUTO: 1.7 % — SIGNIFICANT CHANGE UP (ref 0–6)
GAS PNL BLDV: 138 MMOL/L — SIGNIFICANT CHANGE UP (ref 138–146)
GAS PNL BLDV: SIGNIFICANT CHANGE UP
GLUCOSE SERPL-MCNC: 157 MG/DL — HIGH (ref 70–99)
HCO3 BLDV-SCNC: 34 MMOL/L — HIGH (ref 20–27)
HCT VFR BLD CALC: 29.5 % — LOW (ref 39–50)
HGB BLD-MCNC: 9.6 G/DL — LOW (ref 13–17)
IMM GRANULOCYTES NFR BLD AUTO: 0.8 % — SIGNIFICANT CHANGE UP (ref 0–1.5)
INR BLD: 1.02 — SIGNIFICANT CHANGE UP (ref 0.88–1.16)
LYMPHOCYTES # BLD AUTO: 1.29 K/UL — SIGNIFICANT CHANGE UP (ref 1–3.3)
LYMPHOCYTES # BLD AUTO: 20.3 % — SIGNIFICANT CHANGE UP (ref 13–44)
MAGNESIUM SERPL-MCNC: 2.1 MG/DL — SIGNIFICANT CHANGE UP (ref 1.6–2.6)
MCHC RBC-ENTMCNC: 32.5 GM/DL — SIGNIFICANT CHANGE UP (ref 32–36)
MCHC RBC-ENTMCNC: 33.7 PG — SIGNIFICANT CHANGE UP (ref 27–34)
MCV RBC AUTO: 103.5 FL — HIGH (ref 80–100)
MONOCYTES # BLD AUTO: 0.65 K/UL — SIGNIFICANT CHANGE UP (ref 0–0.9)
MONOCYTES NFR BLD AUTO: 10.2 % — SIGNIFICANT CHANGE UP (ref 2–14)
NEUTROPHILS # BLD AUTO: 4.21 K/UL — SIGNIFICANT CHANGE UP (ref 1.8–7.4)
NEUTROPHILS NFR BLD AUTO: 66.4 % — SIGNIFICANT CHANGE UP (ref 43–77)
NRBC # BLD: 0 /100 WBCS — SIGNIFICANT CHANGE UP (ref 0–0)
PCO2 BLDV: 48 MMHG — SIGNIFICANT CHANGE UP (ref 41–51)
PH BLDV: 7.46 — HIGH (ref 7.32–7.43)
PHOSPHATE SERPL-MCNC: 3.1 MG/DL — SIGNIFICANT CHANGE UP (ref 2.5–4.5)
PLATELET # BLD AUTO: 162 K/UL — SIGNIFICANT CHANGE UP (ref 150–400)
PO2 BLDV: 24 MMHG — SIGNIFICANT CHANGE UP
POTASSIUM BLDV-SCNC: 5 MMOL/L — HIGH (ref 3.5–4.9)
POTASSIUM SERPL-MCNC: 5.3 MMOL/L — SIGNIFICANT CHANGE UP (ref 3.5–5.3)
POTASSIUM SERPL-SCNC: 5.3 MMOL/L — SIGNIFICANT CHANGE UP (ref 3.5–5.3)
PROT SERPL-MCNC: 6.5 G/DL — SIGNIFICANT CHANGE UP (ref 6–8.3)
PROTHROM AB SERPL-ACNC: 11.6 SEC — SIGNIFICANT CHANGE UP (ref 10–12.9)
RBC # BLD: 2.85 M/UL — LOW (ref 4.2–5.8)
RBC # FLD: 14.6 % — HIGH (ref 10.3–14.5)
SAO2 % BLDV: 43 % — SIGNIFICANT CHANGE UP
SARS-COV-2 RNA SPEC QL NAA+PROBE: SIGNIFICANT CHANGE UP
SODIUM SERPL-SCNC: 142 MMOL/L — SIGNIFICANT CHANGE UP (ref 135–145)
WBC # BLD: 6.35 K/UL — SIGNIFICANT CHANGE UP (ref 3.8–10.5)
WBC # FLD AUTO: 6.35 K/UL — SIGNIFICANT CHANGE UP (ref 3.8–10.5)

## 2020-06-15 PROCEDURE — 93005 ELECTROCARDIOGRAM TRACING: CPT

## 2020-06-15 PROCEDURE — 85610 PROTHROMBIN TIME: CPT

## 2020-06-15 PROCEDURE — 99284 EMERGENCY DEPT VISIT MOD MDM: CPT | Mod: CS

## 2020-06-15 PROCEDURE — 82803 BLOOD GASES ANY COMBINATION: CPT

## 2020-06-15 PROCEDURE — 84132 ASSAY OF SERUM POTASSIUM: CPT

## 2020-06-15 PROCEDURE — 36415 COLL VENOUS BLD VENIPUNCTURE: CPT

## 2020-06-15 PROCEDURE — 85025 COMPLETE CBC W/AUTO DIFF WBC: CPT

## 2020-06-15 PROCEDURE — 99283 EMERGENCY DEPT VISIT LOW MDM: CPT

## 2020-06-15 PROCEDURE — 80053 COMPREHEN METABOLIC PANEL: CPT

## 2020-06-15 PROCEDURE — 84100 ASSAY OF PHOSPHORUS: CPT

## 2020-06-15 PROCEDURE — 93010 ELECTROCARDIOGRAM REPORT: CPT

## 2020-06-15 PROCEDURE — 82330 ASSAY OF CALCIUM: CPT

## 2020-06-15 PROCEDURE — 87635 SARS-COV-2 COVID-19 AMP PRB: CPT

## 2020-06-15 PROCEDURE — 85730 THROMBOPLASTIN TIME PARTIAL: CPT

## 2020-06-15 PROCEDURE — 84295 ASSAY OF SERUM SODIUM: CPT

## 2020-06-15 PROCEDURE — 83735 ASSAY OF MAGNESIUM: CPT

## 2020-06-15 NOTE — ED PROVIDER NOTE - MUSCULOSKELETAL, MLM
Spine appears normal, range of motion is not limited, no muscle or joint tenderness. No pedal edema. Spine appears normal, range of motion is not limited

## 2020-06-15 NOTE — ED PROVIDER NOTE - CLINICAL SUMMARY MEDICAL DECISION MAKING FREE TEXT BOX
83 y/o M with PMHx of DM, ESRD (HD MFW), HTN, HLD here with labs on 6/10 with K of 7.9, completed dialysis today. Nephrology saw patient at bedside, plan for labs, CXR, get HD if labs show critical values. 85 y/o M with PMHx of DM, ESRD (HD MFW), HTN, HLD here with labs on 6/10 with K of 7.9, completed dialysis today and has had a total of 3 dialysis sessions since this blood work was drawn. Nephrology saw patient at bedside, plan for labs.   ED course: Pt HD stable. ECG with no acute changes. Potassium today is 5.3. Renal function is at baseline. Pt reassured and to f/up outpt. Return precautions given. Findings discussed with Nephrology and Dr. Dover. 85 y/o M with PMHx of DM, ESRD (HD MFW), HTN, HLD here with labs on 6/10 with K of 7.9, completed dialysis today and has had a total of 3 dialysis sessions since this blood work was drawn. Nephrology saw patient at bedside, plan for labs.   ED course: Pt HD stable. ECG with no acute changes. Potassium today is 5.3. Creatinine 3.38 (improved from 4+ on prior labs). Pt reassured and to f/up outpt. Return precautions given. Findings discussed with Nephrology and Dr. Dover.

## 2020-06-15 NOTE — ED ADULT TRIAGE NOTE - CHIEF COMPLAINT QUOTE
patient sent to ER by Dr Dover for elevated potasium 7.3. finished dialysis PTA. MWF. denies chest pain, palpitations, sob. no acute distress

## 2020-06-15 NOTE — ED PROVIDER NOTE - OBJECTIVE STATEMENT
83 y/o M with PMHx of DM, ESRD (on HD MWF, first HD April 1 after COVID19 diagnosis), HTN, HLD presents to the ED due to labs on 6/10 showing a K of 7.9. Completed dialysis this morning, no missed dialysis. Patient states he feels fine, denies chest pain, SOB, nausea, vomiting, diarrhea, cough, fever, chills, palpitations. No hx of CAD. 85 y/o M with PMHx of DM, ESRD (on HD MWF, first HD April 1 after COVID19 diagnosis), HTN, HLD presents to the ED due to labs on 6/10 showing a K of 7.9. Completed dialysis this morning, has not missed dialysis. He has completed 3 dialysis sessions (including today) since this blood work. Patient states he feels fine, denies chest pain, SOB, nausea, vomiting, diarrhea, cough, fever, chills, palpitations. No hx of CAD. 85 y/o M with PMHx of DM, ESRD (on HD MWF, first HD April 1, with concurrent COVID19 diagnosis), HTN, HLD presents to the ED due to labs on 6/10 showing a K of 7.9. Completed dialysis this morning, has not missed dialysis. He has completed 3 dialysis sessions (including today) since this blood work was done. Patient states he feels fine, denies chest pain, SOB, nausea, vomiting, diarrhea, cough, fever, chills, palpitations. No hx of CAD. No other complaints.

## 2020-06-15 NOTE — ED PROVIDER NOTE - PATIENT PORTAL LINK FT
You can access the FollowMyHealth Patient Portal offered by [facility] by registering at the following website: [email or web address] By joining [facility]’s FollowMyHealth portal, you will also be able to view your health information using other applications (apps) compatible with our system.

## 2020-06-15 NOTE — ED ADULT NURSE NOTE - OBJECTIVE STATEMENT
Pt. referred to ED by nephrologist MD Dover for elevated K+ (7.?) from blood draw ?last wed. Pt. reports feeling in normal state of health, denies any CP, SOB, other Sx at this time. Pt. w/ R chest dialysis port, MWF, finished dialysis today PTA, pt. still makes urine. Placed on tele monitor in ED, EKG reviewed by MD Burch. Ambulatory w/ walker in ED. Partner at bedside.

## 2020-06-15 NOTE — ED PROVIDER NOTE - NSFOLLOWUPINSTRUCTIONS_ED_ALL_ED_FT
Please follow up with your primary care doctor as needed. Resume dialysis as usual. Return to the ER if you develop any concerning symptoms.

## 2020-06-15 NOTE — ED PROVIDER NOTE - CARE PROVIDER_API CALL
Heidi Valentin  INTERNAL MEDICINE  [address], [place] [address]  Phone: [phone]  Fax: [phone]  Follow Up Time:

## 2020-06-15 NOTE — ED ADULT TRIAGE NOTE - IDEAL BODY WEIGHT(KG)
EMERGENCY DEPARTMENT HISTORY AND PHYSICAL EXAM      Date: 8/28/2018  Patient Name: Gene Alas II    History of Presenting Illness     Chief Complaint   Patient presents with    Abdominal Pain       History Provided By: Patient    HPI: Gene Alas II, 63 y.o. male with PMHx significant for stroke, HTN, seizures, A fib,  DM, GERD, and pancreatic cancer, presents via EMS to the ED with cc of mild, intermittent, gradually worsening, generalized abdominal pain beginning 4 months ago along with nausea, vomiting and decreased appetite. Per chart review, pt was admitted to the ED x 7/11/18-7/23/18 and had an endoscopy done which revealed that his pancreatic cancer has metastasized to liver. He was given discharge instructions to continue home medications and start taking Glucotrol, Colace, Megace and Theragran. He states they were not able to do an MRI during that time because he was claustrophobic. He states the discharged him and referred him to outpatient care at [facility]. He specifically states that is where he would like to be transferred today. He mentions that he thinks he is having gas pain and notes his last BM was yesterday. He also reports an abrasion from a fall during EMS transport this morning. He states EMS told him they could not take him to [facility] acute hospital even though that is where his his PCP referred him to to get an MRI. Per nurse, pt is normally on 2 1/2 L of oxygen at home. He denies any other alleviating or exacerbating factors. He specifically denies any fever, chills, CP, SOB, cough, diarrhea, constipation or dysuria. Chief Complaint: Abdominal pain  Duration: 4 Months  Timing:  Gradual, Intermittent and Worsening  Location: Generalized  Quality: None  Severity: Mild  Modifying Factors: None  Associated Symptoms: nausea, vomiting, abdominal distention, and decreased appetite    There are no other complaints, changes, or physical findings at this time.     PCP: Harjeet Amaro, MD    Current Outpatient Prescriptions   Medication Sig Dispense Refill    glipiZIDE (GLUCOTROL) 10 mg tablet Take 1 Tab by mouth Before breakfast and dinner. 60 Tab 0    oxyCODONE-acetaminophen (PERCOCET) 5-325 mg per tablet Take 1 Tab by mouth every four (4) hours as needed for Pain. Max Daily Amount: 6 Tabs. 15 Tab 0    docusate sodium (COLACE) 100 mg capsule Take 1 Cap by mouth two (2) times a day for 90 days. 60 Cap 0    megestrol (MEGACE) 400 mg/10 mL (10 mL) suspension Take 5 mL by mouth daily. 1 Bottle 0    ALPRAZolam (XANAX) 1 mg tablet Take 1 mg by mouth four (4) times daily.  citalopram (CELEXA) 40 mg tablet Take 40 mg by mouth daily.  lisinopril (PRINIVIL, ZESTRIL) 20 mg tablet Take 20 mg by mouth two (2) times a day.  pantoprazole (PROTONIX) 40 mg tablet Take 40 mg by mouth daily.  multivitamin (ONE A DAY) tablet Take 1 Tab by mouth daily.  oxybutynin (DITROPAN) 5 mg tablet Take 5 mg by mouth nightly.  tamsulosin (FLOMAX) 0.4 mg capsule Take 0.4 mg by mouth daily.  albuterol (PROVENTIL HFA, VENTOLIN HFA, PROAIR HFA) 90 mcg/actuation inhaler Take 2 Puffs by inhalation every four (4) hours as needed for Wheezing. 1 Inhaler 0    sildenafil citrate (VIAGRA) 100 mg tablet Take 100 mg by mouth as needed (\"To set the mood\").  rivaroxaban (XARELTO) 20 mg tab tablet Take 20 mg by mouth daily.  divalproex DR (DEPAKOTE) 500 mg tablet Take 1 tablet by mouth two (2) times a day. 90 tablet 3    nitroglycerin (NITROSTAT) 0.4 mg SL tablet 0.4 mg by SubLINGual route every five (5) minutes as needed for Chest Pain. Up to 3 doses.  fluticasone (FLONASE ALLERGY RELIEF) 50 mcg/actuation nasal spray 2 Sprays by Both Nostrils route daily as needed for Rhinitis.  chlorpheniramine-HYDROcodone (TUSSIONEX PENNKINETIC ER) 10-8 mg/5 mL suspension Take 5 mL by mouth every twelve (12) hours as needed for Cough. Max Daily Amount: 10 mL.  60 mL 0       Past History     Past Medical History:  Past Medical History:   Diagnosis Date    Acquired lactose intolerance 12/1/2010    Atrial fibrillation ([facility] [address] 12/1/2010    Atrial septal aneurysm 3/12/2011    Chronic pain     left abdomin    Depression     Diabetes ([facility] [address] 10/26/01    LOPEZ (dyspnea on exertion) 10/26/01    Essential hypertension     GERD (gastroesophageal reflux disease) 10/26/01    Headache(784.0) 10/26/01    Hypertension     IHSS (idiopathic hypertrophic subaortic stenosis) ([address] 12/1/2010    Morbid obesity ([facility] [address] 10/26/01    Other ill-defined conditions(799.89)     sleep apnea    Other ill-defined conditions(799.89)     migraines    Pneumonia     Psychiatric disorder     Depression since 2005    Secondary seizure disorder ([address] 12/1/2010    Seizures ([facility] [address]     since 2005    Sleep apnea 10/26/01    uses C pap    Stroke [facility])     TIA 2004    Syncope 2/7/2011    asystole (long) when getting Picc line at [facility], was admitted for pneumonia       Past Surgical History:  Past Surgical History:   Procedure Laterality Date    ABDOMEN SURGERY PROC UNLISTED      GASTRIC BYPASS,OBESE<150CM TODD-EN-Y  1/7/02    Dr Tin Lopez    HX 2 [place] HX GI      lap gastric bypass 1/7/02    HX GYN      cyst    HX HEENT  2000    UPPP    HX ORTHOPAEDIC      carpal tunnel/trigger finger--bilateral    HX OTHER SURGICAL      cyst removed from groin    HX OTHER SURGICAL      hernia repair       Family History:  Family History   Problem Relation Age of Onset    Hypertension Mother     Heart Disease Mother     Stroke Mother     Cancer Mother     Diabetes Father     Hypertension Father     Sickle Cell Trait Sister     Hypertension Sister     Bleeding Prob Sister     Hypertension Brother        Social History:  Social History   Substance Use Topics    Smoking status: Former Smoker     Packs/day: 2.00     Types: Cigarettes     Quit date: 7/20/2018    Smokeless tobacco: Never Used      Comment: quit [id number]    Alcohol use 0.6 oz/week     1 Glasses of wine per week      Comment: used to       Allergies: Allergies   Allergen Reactions    Pcn [Penicillins] Anaphylaxis, Hives and Itching                Review of Systems   Review of Systems   Constitutional: Positive for appetite change (+decreased). Negative for chills and fever. HENT: Negative for congestion, rhinorrhea, sneezing and sore throat. Eyes: Negative for redness and visual disturbance. Respiratory: Negative for shortness of breath. Cardiovascular: Negative for chest pain and leg swelling. Gastrointestinal: Positive for abdominal distention, abdominal pain (+generalized), nausea and vomiting. Genitourinary: Negative for difficulty urinating and frequency. Musculoskeletal: Negative for back pain, myalgias and neck stiffness. Skin: Negative for rash. Neurological: Negative for dizziness, syncope, weakness and headaches. Hematological: Negative for adenopathy. All other systems reviewed and are negative. Physical Exam   Physical Exam   Constitutional: He is oriented to person, place, and time. He appears well-developed and well-nourished. HENT:   Head: Normocephalic and atraumatic. Mouth/Throat: Oropharynx is clear and moist and mucous membranes are normal.   Eyes: EOM are normal.   Neck: Normal range of motion and full passive range of motion without pain. Neck supple. Cardiovascular: Normal rate, regular rhythm, normal heart sounds, intact distal pulses and normal pulses. No murmur heard. Pulmonary/Chest: Effort normal and breath sounds normal. No respiratory distress. He exhibits no tenderness. Abdominal: Soft. Normal appearance and bowel sounds are normal. He exhibits distension. There is tenderness. There is no rebound and no guarding. Diffused abdominal tenderness and distention. Normal bowel sounds. Neurological: He is alert and oriented to person, place, and time. He has normal strength.    Skin: Skin is warm, dry and intact. No rash noted. No erythema. Abrasion on left elbow   Psychiatric: He has a normal mood and affect. His speech is normal and behavior is normal. Judgment and thought content normal.   Nursing note and vitals reviewed. Diagnostic Study Results     Labs -     Recent Results (from the past 12 hour(s))   CBC WITH AUTOMATED DIFF    Collection Time: 08/28/18  7:36 AM   Result Value Ref Range    WBC 9.2 4.1 - 11.1 K/uL    RBC 5.98 (H) 4.10 - 5.70 M/uL    HGB 13.9 12.1 - 17.0 g/dL    HCT 42.3 36.6 - 50.3 %    MCV 70.7 (L) 80.0 - 99.0 FL    MCH 23.2 (L) 26.0 - 34.0 PG    MCHC 32.9 30.0 - 36.5 g/dL    RDW 17.2 (H) 11.5 - 14.5 %    PLATELET 166 632 - 611 K/uL    NRBC 0.8 (H) 0  WBC    ABSOLUTE NRBC 0.07 (H) 0.00 - 0.01 K/uL    NEUTROPHILS 81 (H) 32 - 75 %    LYMPHOCYTES 11 (L) 12 - 49 %    MONOCYTES 7 5 - 13 %    EOSINOPHILS 1 0 - 7 %    BASOPHILS 0 0 - 1 %    IMMATURE GRANULOCYTES 0 0.0 - 0.5 %    ABS. NEUTROPHILS 7.5 1.8 - 8.0 K/UL    ABS. LYMPHOCYTES 1.0 0.8 - 3.5 K/UL    ABS. MONOCYTES 0.6 0.0 - 1.0 K/UL    ABS. EOSINOPHILS 0.1 0.0 - 0.4 K/UL    ABS. BASOPHILS 0.0 0.0 - 0.1 K/UL    ABS. IMM. GRANS. 0.0 0.00 - 0.04 K/UL    DF SMEAR SCANNED      RBC COMMENTS ANISOCYTOSIS  1+        RBC COMMENTS TARGET CELLS  1+       METABOLIC PANEL, COMPREHENSIVE    Collection Time: 08/28/18  7:36 AM   Result Value Ref Range    Sodium 137 136 - 145 mmol/L    Potassium 4.0 3.5 - 5.1 mmol/L    Chloride 100 97 - 108 mmol/L    CO2 33 (H) 21 - 32 mmol/L    Anion gap 4 (L) 5 - 15 mmol/L    Glucose 170 (H) 65 - 100 mg/dL    BUN 22 (H) 6 - 20 MG/DL    Creatinine 1.38 (H) 0.70 - 1.30 MG/DL    BUN/Creatinine ratio 16 12 - 20      GFR est AA >60 >60 ml/min/1.73m2    GFR est non-AA 53 (L) >60 ml/min/1.73m2    Calcium 8.7 8.5 - 10.1 MG/DL    Bilirubin, total 1.1 (H) 0.2 - 1.0 MG/DL    ALT (SGPT) 35 12 - 78 U/L    AST (SGOT) 63 (H) 15 - 37 U/L    Alk.  phosphatase 483 (H) 45 - 117 U/L    Protein, total 7.1 6.4 - 8.2 g/dL Albumin 2.5 (L) 3.5 - 5.0 g/dL    Globulin 4.6 (H) 2.0 - 4.0 g/dL    A-G Ratio 0.5 (L) 1.1 - 2.2     LIPASE    Collection Time: 08/28/18  7:36 AM   Result Value Ref Range    Lipase 115 73 - 393 U/L   LACTIC ACID    Collection Time: 08/28/18  7:36 AM   Result Value Ref Range    Lactic acid 1.8 0.4 - 2.0 MMOL/L       Radiologic Studies -   XR ABD FLAT/ ERECT   Final Result        CT Results  (Last 48 hours)    None          Medical Decision Making   I am the first provider for this patient. I reviewed the vital signs, available nursing notes, past medical history, past surgical history, family history and social history. Vital Signs-Reviewed the patient's vital signs. Patient Vitals for the past 12 hrs:   Temp Pulse Resp BP SpO2   08/28/18 1001 97.8 °F (36.6 °C) 88 18 (!) 143/96 100 %   08/28/18 0800 - - - 112/74 -   08/28/18 0759 - - - - 100 %   08/28/18 0702 97.8 °F (36.6 °C) 89 18 (!) 120/92 100 %     Records Reviewed: Nursing Notes, Old Medical Records, Previous electrocardiograms, Ambulance Run Sheet, Previous Radiology Studies and Previous Laboratory Studies    Provider Notes (Medical Decision Making):   DDx: Pancreatitis, pancreatic cancer, small bowel obstruction, constipation    ED Course:   Initial assessment performed. The patients presenting problems have been discussed, and they are in agreement with the care plan formulated and outlined with them. I have encouraged them to ask questions as they arise throughout their visit. PROGRESS NOTE:   7:18 AM  Pt has been re-examined by Aileen Brock MD. Pt agrees to labs and IV fluids. CONSULT NOTE:   10:23 AM  Aileen Brock MD spoke with Medina Vivar MD,   Specialty: [place] cancer  Discussed pt's hx, disposition, and available diagnostic and imaging results. Reviewed care plans. Consultant agrees with plans as outlined. She recommends to discharge the pt. She states pt has an apt at [facility] tomorrow to start Chemotherapy.  Will plan for admission then if needed. Written by Pretty Silver, ED Scribe, as dictated by Bud England MD.    PROGRESS NOTE:   10:40 AM  Pt has been re-examined by Bud England MD. Pt states he feels a little bit better, however he is still in pain. PROGRESS NOTE:   10:53 AM  Pt has been re-examined by Bud England MD. Pt agrees to discharge plan to [facility] for outpatient MRI. Return to ED if sxs worsen. Disposition:  DISCHARGE NOTE  10:53 AM  The patient has been re-evaluated and is ready for discharge. Reviewed available results with patient and family. Counseled pt and family on diagnosis and care plan. Pt and family have expressed understanding, and all questions have been answered. Pt and family agree with plan and agree to F/U as recommended, or return to the ED if their sxs worsen. Discharge instructions have been provided and explained to the pt and their family, along with reasons to return to the ED. Written by Pretty iSlver, ED Scribe, as dictated by Bud England MD.  PLAN:  1. Current Discharge Medication List        2. Follow-up Information     Follow up With Cynthia Ackerman In 1 day      [facility] EMERGENCY DEPT  As needed, If symptoms worsen [address] [facility]  [phone]        Return to ED if worse     Diagnosis     Clinical Impression:   1. Abdominal pain, generalized    2. Constipation, unspecified constipation type    3. Dehydration        Attestations: This note is prepared by Pretty Silver, acting as Scribe for Bud England MD.    Bud England MD: The scribe's documentation has been prepared under my direction and personally reviewed by me in its entirety. I confirm that the note above accurately reflects all work, treatment, procedures, and medical decision making performed by me. 66

## 2020-06-18 ENCOUNTER — APPOINTMENT (OUTPATIENT)
Dept: ENDOCRINOLOGY | Facility: CLINIC | Age: 85
End: 2020-06-18
Payer: MEDICARE

## 2020-06-18 PROCEDURE — 99213 OFFICE O/P EST LOW 20 MIN: CPT | Mod: 95

## 2020-06-18 NOTE — ASSESSMENT
[FreeTextEntry1] : Diabetes, ESRD.  A1c at/below goal but may be falsely low due to ESRD.\par Continue reduced doses of Lantus and Ozempic.  sugars seem stable and he is not having hypoglycemia \par continue regular walking.  Advised to maintain current weight (I don't want him to go back to his old weight)\par RTO 3 months

## 2020-06-18 NOTE — DATA REVIEWED
[FreeTextEntry1] : 3/20: A1c 5.3%, TSH 1.47\par 1/20: A1c 5.7%, Cr 3.69, GFR 14\par 11/19: A1c 5.7%, Cr 4.29, GFR 12, , Ca 9.0; tot chol 128, trig 83, HDL 65, \par 9/19: A1c 6.8%, tot chol 112, HDL 42, trig 245, LDL 39, Cr 3.86\par 5/19: A1c 6.7%, Cr 3.27\par 2/19: A1c 7.1%, Cr 3.04, tot chol 108, trig 141, HDL 48, LDL 38\par 10/18: A1c 7.2%, tot chol 94, HDL 37, trig 222, LDL 29, Cr 2.96\par 8/18: tot chol 102, HDL 40, LDL 35\par 7/18: tot chol 115, HDL 48, LDL 37, trig 259, A1c 7.1%, Hb 12.7\par 5/18: A1c 7.3%,  tot chol 99, trig 87, HDL 41, LDL 41, Cr 2.68\par 2/18: Cr 2.39, tot chol 145, HDL 36, trig 439, , 25D 39\par 1/18: A1c 7.3%\par 12/17: A1c 7.1%, tot chol 204, trig 212, HDL 36, \par 9/17: Cr 2.71, alk phos 88, tot chol 205, HDL 38, , trig 369\par 5/17: A1c 5.9%, tot chol 113, trig 83, HDL 54, LDL 42, TSH 1.40, Cr 2.22

## 2020-06-18 NOTE — HISTORY OF PRESENT ILLNESS
[FreeTextEntry1] :  wife is present for televisit\par Recently went to [facility] ED for hyperkalemia.  K 7.9 but repeat in ED was 5.3 and he was discharged, and started taking K binder earlier this week.\par glucose have been steady, high 80s-low 90s in mornings.  no sugars < 80, and not having any hypoglycemia symptoms\par feeling fine.  able to walk without walker now but needs a person close by.  is walking every day.\par current weight is 190lb and he has been at this weight for past 2 weeks\par went to [facility] kidney transplant center--wife is a match but they recommended that she not donate her kidney to him (she may not do well with only one kidney)\par last A1c was 5.6%, 2-3 weeks ago\par \par PMH: CKD\par sleep apnea, has CPAP machine\par gout, arthritis\par Paget's s/p Reclast in 6/2014. alk phos normal since.\par melanoma, top of head 2019 s/p Moh's.  L pre tibial lesion: verrucous acanthoma\par \par Meds:\par Ozempic 0.5mg/week, Lantus 16 units\par amlodipine 10mg, doxazosin 8mg hs\par Flonase, azelastine, Claritin, montelukast\par furosemide 20mg, 2 tab/day\par aspirin 81mg, Crestor 20mg\par ranitidine 150mg/day\par allopurinol 100mg bid, colchicine prn\par vitamin D 5000/day, Citrucel, Miralax, glucosamine\par

## 2020-06-18 NOTE — REASON FOR VISIT
[Medical Office: ([facility])___] : at the medical office located in  [Home] : at home, [unfilled] , at the time of the visit. [Follow - Up] : a follow-up visit [Spouse] : spouse [Verbal consent obtained from patient] : the patient, [unfilled] [DM Type 2] : DM Type 2

## 2020-07-29 ENCOUNTER — APPOINTMENT (OUTPATIENT)
Dept: ENDOCRINOLOGY | Facility: CLINIC | Age: 85
End: 2020-07-29

## 2020-11-24 ENCOUNTER — NON-APPOINTMENT (OUTPATIENT)
Age: 85
End: 2020-11-24

## 2020-12-04 NOTE — ED PROVIDER NOTE - PSYCHIATRIC, MLM
1 Alert and oriented to person, place, time/situation. normal mood and affect. no apparent risk to self or others.

## 2021-01-12 NOTE — DISCHARGE NOTE PROVIDER - NSDCQMERRANDS_GEN_ALL_CORE
No You can access the FollowMyHealth Patient Portal offered by [facility] by registering at the following website: [email or web address]. By joining [employer]’s FollowMyHealth portal, you will also be able to view your health information using other applications (apps) compatible with our system.

## 2021-01-21 ENCOUNTER — NON-APPOINTMENT (OUTPATIENT)
Age: 86
End: 2021-01-21

## 2021-02-02 DIAGNOSIS — Z63.5 DISRUPTION OF FAMILY BY SEPARATION AND DIVORCE: ICD-10-CM

## 2021-02-02 DIAGNOSIS — G47.30 SLEEP APNEA, UNSPECIFIED: ICD-10-CM

## 2021-02-02 SDOH — SOCIAL STABILITY - SOCIAL INSECURITY: DISRUPTION OF FAMILY BY SEPARATION AND DIVORCE: Z63.5

## 2021-02-04 ENCOUNTER — APPOINTMENT (OUTPATIENT)
Dept: UROLOGY | Facility: CLINIC | Age: 86
End: 2021-02-04
Payer: MEDICARE

## 2021-02-04 VITALS — TEMPERATURE: 98.2 F

## 2021-02-04 DIAGNOSIS — U07.1 COVID-19: ICD-10-CM

## 2021-02-04 LAB
BILIRUB UR QL STRIP: NORMAL
CLARITY UR: CLEAR
COLLECTION METHOD: NORMAL
GLUCOSE UR-MCNC: NORMAL
HCG UR QL: 0.2 EU/DL
HGB UR QL STRIP.AUTO: NORMAL
KETONES UR-MCNC: NORMAL
LEUKOCYTE ESTERASE UR QL STRIP: NORMAL
NITRITE UR QL STRIP: NORMAL
PH UR STRIP: 6.5
PROT UR STRIP-MCNC: 300
SP GR UR STRIP: 1025

## 2021-02-04 PROCEDURE — 99205 OFFICE O/P NEW HI 60 MIN: CPT | Mod: 25

## 2021-02-04 PROCEDURE — 81003 URINALYSIS AUTO W/O SCOPE: CPT | Mod: QW

## 2021-02-04 PROCEDURE — 51741 ELECTRO-UROFLOWMETRY FIRST: CPT

## 2021-02-04 PROCEDURE — 76857 US EXAM PELVIC LIMITED: CPT

## 2021-02-04 NOTE — ADDENDUM
[FreeTextEntry1] : A portion of this note was written by [David Hubbard] on 02/02/2021 acting as a scribe for Dr. Dunaway. \par \par I have personally reviewed the chart and agree that the record accurately reflects my personal performance of the history, physical exam, assessment, and plan.

## 2021-02-04 NOTE — HISTORY OF PRESENT ILLNESS
[FreeTextEntry1] : Mr. JOSHUA ALBRIGHT comes in today for his urologic follow-up.  He is on home hemodialysis since April 2020 when he was hospitalized for COVID-19.  He is currently on a renal transplant list.\par \par Mr. Albright has a urethral stricture for which he underwent an augmented repair using a circular penile fasciocutaneous flap in October 1997.  In July 2013, he had a laser urethrotomy for a focal recurrence.  Since then, he has self-dilated every 3-4 weeks using an 18 Fr catheter.  The stricture has remained stable.\par \par From his general urologic history, Mr. Albright presents with mild lower urinary tract symptoms (frequency, weak stream), with nocturia x 1. \par Sono: Nil PVR\par Flow: Peak 6.7ml/s, 35ml volume\par \par Mr. Albright has longstanding erectile dysfunction, which had been managed with Viagra 1/2 100 mg in the past; he no longer uses this. In addition, he had been on Androgel for approximately 15 years, which he discontinued.\par \par PSAs: 10/17/16--0.9; 7/7/15--1.18; \par \par Creat:  1/11/21--5.81; 11/22/19--4.29\par \par Testosterone: 7/7/15--160;

## 2021-02-04 NOTE — PHYSICAL EXAM
[General Appearance - Well Developed] : well developed [General Appearance - Well Nourished] : well nourished [Normal Appearance] : normal appearance [Well Groomed] : well groomed [General Appearance - In No Acute Distress] : no acute distress [Edema] : no peripheral edema [Respiration, Rhythm And Depth] : normal respiratory rhythm and effort [Exaggerated Use Of Accessory Muscles For Inspiration] : no accessory muscle use [Abdomen Soft] : soft [Abdomen Tenderness] : non-tender [Costovertebral Angle Tenderness] : no ~M costovertebral angle tenderness [Urinary Bladder Findings] : the bladder was normal on palpation [Testes Mass (___cm)] : there were no testicular masses [Scrotum] : the scrotum was normal [No Prostate Nodules] : no prostate nodules [Normal Station and Gait] : the gait and station were normal for the patient's age [] : no rash [No Focal Deficits] : no focal deficits [Oriented To Time, Place, And Person] : oriented to person, place, and time [Affect] : the affect was normal [Mood] : the mood was normal [Not Anxious] : not anxious [No Palpable Adenopathy] : no palpable adenopathy [Heart Rate And Rhythm] : Heart rate and rhythm were normal [Abdomen Mass (___ Cm)] : no abdominal mass palpated [Abdomen Hernia] : no hernia was discovered [Penis Abnormality] : normal circumcised penis [Epididymis] : the epididymides were normal [Testes Tenderness] : no tenderness of the testes [Prostate Tenderness] : the prostate was not tender [Skin Color & Pigmentation] : normal skin color and pigmentation [FreeTextEntry1] : Meatal blanching/narrowing, consistent with LSA. Coronal hypospadias.

## 2021-02-04 NOTE — ASSESSMENT
[FreeTextEntry1] : I discussed the findings and options with . JOSHUA ALBRIGHT in detail.  Although he has a known longstanding urethral stricture, he is managing well with monthly self dilations using an 18 Belgian catheter.  Hence, I do not feel that any further urologic intervention is warranted at this time.  For his upcoming renal transplant, a 14 Belgian Galeas catheter should be able to be placed intraoperatively without difficulty.\par \par Mr. Albright we will continue with the aforementioned regimen and return, electively, in 6 to 12 months for follow-up (flow, bladder sono).\par

## 2021-02-04 NOTE — LETTER BODY
[Dear  ___] : Dear  [unfilled], [Consult Letter:] : I had the pleasure of evaluating your patient, [unfilled]. [Please see my note below.] : Please see my note below. [Consult Closing:] : Thank you very much for allowing me to participate in the care of this patient.  If you have any questions, please do not hesitate to contact me. [Sincerely,] : Sincerely, [FreeTextEntry3] : Forrest Dunaway MD, FACS [DrVincent  ___] : Dr. CARRILLO [___] : [unfilled]

## 2021-02-08 LAB — BACTERIA UR CULT: NORMAL

## 2021-03-26 NOTE — PROGRESS NOTE ADULT - PROBLEM SELECTOR PLAN 2
Will recheck lipid panel today  Patient started a Keto diet about 3 months ago and states she lost about 23 lbs  -CXR showed Bilateral patchy/hazy opacities with a mid/lower lung zone   -COVID treatment initiated    pneumonia likely due to COVID:  - c/w NRB  or nasal cannula  for now-- monitor for hypoxemia

## 2021-06-21 ENCOUNTER — TRANSCRIPTION ENCOUNTER (OUTPATIENT)
Age: 86
End: 2021-06-21

## 2021-08-02 ENCOUNTER — APPOINTMENT (OUTPATIENT)
Dept: NEPHROLOGY | Facility: CLINIC | Age: 86
End: 2021-08-02

## 2021-08-13 ENCOUNTER — APPOINTMENT (OUTPATIENT)
Dept: UROLOGY | Facility: CLINIC | Age: 86
End: 2021-08-13
Payer: MEDICARE

## 2021-08-13 VITALS
HEART RATE: 79 BPM | BODY MASS INDEX: 29.25 KG/M2 | HEIGHT: 66 IN | SYSTOLIC BLOOD PRESSURE: 199 MMHG | WEIGHT: 182 LBS | TEMPERATURE: 97.5 F | DIASTOLIC BLOOD PRESSURE: 81 MMHG

## 2021-08-13 PROCEDURE — 99215 OFFICE O/P EST HI 40 MIN: CPT

## 2021-08-13 PROCEDURE — 51741 ELECTRO-UROFLOWMETRY FIRST: CPT

## 2021-08-13 PROCEDURE — 51798 US URINE CAPACITY MEASURE: CPT

## 2021-08-13 RX ORDER — AZELASTINE HYDROCHLORIDE 137 UG/1
137 SPRAY, METERED NASAL
Refills: 0 | Status: DISCONTINUED | COMMUNITY
End: 2021-08-13

## 2021-08-13 RX ORDER — SEMAGLUTIDE 1.34 MG/ML
2 INJECTION, SOLUTION SUBCUTANEOUS
Qty: 3 | Refills: 1 | Status: DISCONTINUED | COMMUNITY
Start: 2019-07-09 | End: 2021-08-13

## 2021-08-13 RX ORDER — LANCETS
EACH MISCELLANEOUS
Qty: 100 | Refills: 5 | Status: DISCONTINUED | COMMUNITY
Start: 2020-10-01 | End: 2021-08-13

## 2021-08-13 RX ORDER — ROSUVASTATIN CALCIUM 20 MG/1
20 TABLET, FILM COATED ORAL
Refills: 0 | Status: DISCONTINUED | COMMUNITY
End: 2021-08-13

## 2021-08-13 RX ORDER — AZELASTINE HYDROCHLORIDE 137 UG/1
SPRAY, METERED NASAL
Refills: 0 | Status: ACTIVE | COMMUNITY

## 2021-08-13 RX ORDER — TACROLIMUS 5 MG/1
5 CAPSULE, GELATIN COATED ORAL
Refills: 0 | Status: ACTIVE | COMMUNITY

## 2021-08-13 RX ORDER — PEN NEEDLE, DIABETIC 31 G X1/4"
31G X 6 MM NEEDLE, DISPOSABLE MISCELLANEOUS
Qty: 1 | Refills: 3 | Status: DISCONTINUED | COMMUNITY
Start: 2017-01-18 | End: 2021-08-13

## 2021-08-13 RX ORDER — PEN NEEDLE, DIABETIC 29 G X1/2"
32G X 4 MM NEEDLE, DISPOSABLE MISCELLANEOUS
Qty: 100 | Refills: 1 | Status: DISCONTINUED | COMMUNITY
Start: 2021-01-15 | End: 2021-08-13

## 2021-08-13 RX ORDER — FLUTICASONE PROPIONATE 50 MCG
SPRAY, SUSPENSION NASAL
Refills: 0 | Status: DISCONTINUED | COMMUNITY
End: 2021-08-13

## 2021-08-13 RX ORDER — LORATADINE 10 MG
17 TABLET,DISINTEGRATING ORAL
Refills: 0 | Status: ACTIVE | COMMUNITY

## 2021-08-13 RX ORDER — ATOVAQUONE 750 MG/5ML
750 SUSPENSION ORAL
Refills: 0 | Status: ACTIVE | COMMUNITY

## 2021-08-13 RX ORDER — DOCUSATE SODIUM 100 MG/1
100 TABLET ORAL
Refills: 0 | Status: ACTIVE | COMMUNITY

## 2021-08-13 RX ORDER — SEVELAMER CARBONATE 800 MG/1
800 TABLET, FILM COATED ORAL
Qty: 540 | Refills: 3 | Status: DISCONTINUED | COMMUNITY
Start: 2020-05-27 | End: 2021-08-13

## 2021-08-13 RX ORDER — KETOCONAZOLE 20 MG/G
2 CREAM TOPICAL
Qty: 30 | Refills: 0 | Status: DISCONTINUED | COMMUNITY
Start: 2017-03-14 | End: 2021-08-13

## 2021-08-13 RX ORDER — ROSUVASTATIN CALCIUM 5 MG/1
TABLET, FILM COATED ORAL
Refills: 0 | Status: ACTIVE | COMMUNITY

## 2021-08-13 RX ORDER — FLUTICASONE PROPIONATE 50 UG/1
50 SPRAY, METERED NASAL
Refills: 0 | Status: ACTIVE | COMMUNITY

## 2021-08-13 RX ORDER — COLCHICINE 0.6 MG/1
0.6 TABLET ORAL
Qty: 45 | Refills: 3 | Status: DISCONTINUED | COMMUNITY
Start: 2017-12-04 | End: 2021-08-13

## 2021-08-13 RX ORDER — FUROSEMIDE 20 MG/1
20 TABLET ORAL
Qty: 270 | Refills: 0 | Status: DISCONTINUED | COMMUNITY
Start: 2017-02-13 | End: 2021-08-13

## 2021-08-13 RX ORDER — ROSUVASTATIN CALCIUM 5 MG/1
5 TABLET, FILM COATED ORAL
Qty: 30 | Refills: 5 | Status: DISCONTINUED | COMMUNITY
Start: 2020-05-18 | End: 2021-08-13

## 2021-08-13 RX ORDER — PREDNISONE 5 MG/1
5 TABLET ORAL
Refills: 0 | Status: ACTIVE | COMMUNITY

## 2021-08-13 RX ORDER — CHLORHEXIDINE GLUCONATE 4 %
325 (65 FE) LIQUID (ML) TOPICAL
Refills: 0 | Status: DISCONTINUED | COMMUNITY
End: 2021-08-13

## 2021-08-13 RX ORDER — MULTIVIT-MINERALS/FOLIC ACID 200 MCG
TABLET,CHEWABLE ORAL DAILY
Qty: 100 | Refills: 3 | Status: DISCONTINUED | COMMUNITY
Start: 2020-01-16 | End: 2021-08-13

## 2021-08-13 RX ORDER — CELECOXIB 100 MG/1
100 CAPSULE ORAL
Qty: 60 | Refills: 0 | Status: DISCONTINUED | COMMUNITY
Start: 2016-11-29 | End: 2021-08-13

## 2021-08-13 RX ORDER — ACETAMINOPHEN/DIPHENHYDRAMINE 500MG-25MG
81 TABLET ORAL
Qty: 30 | Refills: 5 | Status: DISCONTINUED | COMMUNITY
Start: 2020-05-18 | End: 2021-08-13

## 2021-08-13 RX ORDER — MYCOPHENOLATE MOFETIL 250 MG/1
250 CAPSULE ORAL
Refills: 0 | Status: ACTIVE | COMMUNITY

## 2021-08-13 RX ORDER — BLOOD-GLUCOSE METER
KIT MISCELLANEOUS
Qty: 100 | Refills: 3 | Status: DISCONTINUED | COMMUNITY
Start: 2020-01-16 | End: 2021-08-13

## 2021-08-13 RX ORDER — HYDROCORTISONE 25 MG/G
2.5 CREAM TOPICAL
Qty: 30 | Refills: 0 | Status: DISCONTINUED | COMMUNITY
Start: 2017-03-14 | End: 2021-08-13

## 2021-08-13 RX ORDER — SODIUM ZIRCONIUM CYCLOSILICATE 5 G/5G
5 POWDER, FOR SUSPENSION ORAL
Qty: 120 | Refills: 3 | Status: DISCONTINUED | COMMUNITY
Start: 2020-06-15 | End: 2021-08-13

## 2021-08-13 RX ORDER — ALLOPURINOL 100 MG/1
100 TABLET ORAL
Qty: 225 | Refills: 3 | Status: DISCONTINUED | COMMUNITY
Start: 2017-12-04 | End: 2021-08-13

## 2021-08-13 RX ORDER — MONTELUKAST 10 MG/1
10 TABLET, FILM COATED ORAL
Refills: 0 | Status: ACTIVE | COMMUNITY

## 2021-08-13 RX ORDER — NIFEDIPINE 90 MG/1
TABLET, EXTENDED RELEASE ORAL
Refills: 0 | Status: ACTIVE | COMMUNITY

## 2021-08-13 RX ORDER — MONTELUKAST SODIUM 10 MG/1
10 TABLET, FILM COATED ORAL
Refills: 0 | Status: DISCONTINUED | COMMUNITY
End: 2021-08-13

## 2021-08-13 RX ORDER — INSULIN GLARGINE 100 [IU]/ML
100 INJECTION, SOLUTION SUBCUTANEOUS
Refills: 0 | Status: ACTIVE | COMMUNITY

## 2021-08-13 RX ORDER — PEN NEEDLE, DIABETIC 31 G X1/4"
31G X 6 MM NEEDLE, DISPOSABLE MISCELLANEOUS
Qty: 100 | Refills: 3 | Status: DISCONTINUED | COMMUNITY
Start: 2020-05-03 | End: 2021-08-13

## 2021-08-13 RX ORDER — VALACYCLOVIR HYDROCHLORIDE 1 G/1
1 TABLET, FILM COATED ORAL
Refills: 0 | Status: DISCONTINUED | COMMUNITY
End: 2021-08-13

## 2021-08-13 RX ORDER — BLOOD SUGAR DIAGNOSTIC
STRIP MISCELLANEOUS
Qty: 50 | Refills: 5 | Status: DISCONTINUED | COMMUNITY
Start: 2020-05-11 | End: 2021-08-13

## 2021-08-13 NOTE — PHYSICAL EXAM
[General Appearance - Well Developed] : well developed [General Appearance - Well Nourished] : well nourished [Normal Appearance] : normal appearance [Well Groomed] : well groomed [General Appearance - In No Acute Distress] : no acute distress [Edema] : no peripheral edema [] : no respiratory distress [Respiration, Rhythm And Depth] : normal respiratory rhythm and effort [Exaggerated Use Of Accessory Muscles For Inspiration] : no accessory muscle use [Oriented To Time, Place, And Person] : oriented to person, place, and time [Affect] : the affect was normal [Mood] : the mood was normal [Not Anxious] : not anxious [FreeTextEntry1] : Walks with walker [No Focal Deficits] : no focal deficits

## 2021-08-13 NOTE — ASSESSMENT
[FreeTextEntry1] : 86 year old man with history of hypospadias s/p multiple repairs and longstanding urethral stricture managed with monthly self-dilations with 18 Fr catheter, s/p renal transplant 4 months, presents with bothersome frequency, urgency and nocturia. Patient feels urinary symptoms have worsened significantly since transplant despite similar volumes of urine output. We discussed possible etiologies and treatment options, including BPH with bladder outlet obstruction, overactive bladder, recurrent urethral stricture, increased volume intake, increased urine volume, decreased bladder cacpacity. It is unclear to me what the etiology is at this time. Flomax did not help and I worry about starting anticholinergic given patient's age and mirabegron given patient's high blood pressure. Therefore, will start with voiding diary and repeat uroflow with full bladder to assess for obstruction. \par  - F/U with completed voiding diary\par  - F/U for uroflow\par  - Behavioral modifications: limit fluid intake prior to bedtime, limit caffeine and alcohol intake, elevate legs 2 hours prior to bedtime, double voiding to ensure complete emptying\par  - Continue flomax for now

## 2021-08-14 ENCOUNTER — TRANSCRIPTION ENCOUNTER (OUTPATIENT)
Age: 86
End: 2021-08-14

## 2021-08-18 ENCOUNTER — APPOINTMENT (OUTPATIENT)
Dept: UROLOGY | Facility: CLINIC | Age: 86
End: 2021-08-18
Payer: MEDICARE

## 2021-08-18 VITALS — DIASTOLIC BLOOD PRESSURE: 87 MMHG | SYSTOLIC BLOOD PRESSURE: 171 MMHG | TEMPERATURE: 98.3 F

## 2021-08-18 DIAGNOSIS — G47.33 OBSTRUCTIVE SLEEP APNEA (ADULT) (PEDIATRIC): ICD-10-CM

## 2021-08-18 DIAGNOSIS — Z99.89 OBSTRUCTIVE SLEEP APNEA (ADULT) (PEDIATRIC): ICD-10-CM

## 2021-08-18 PROCEDURE — 99214 OFFICE O/P EST MOD 30 MIN: CPT

## 2021-08-18 NOTE — ASSESSMENT
[FreeTextEntry1] : 86 year old man with history of hypospadias s/p multiple repairs and longstanding urethral stricture managed with monthly self-dilations with 18 Fr catheter, s/p renal transplant 4 months, presents with bothersome frequency, urgency and nocturia. Patient feels urinary symptoms have worsened significantly since transplant despite similar volumes of urine output. We discussed possible etiologies and treatment options, including BPH with bladder outlet obstruction, overactive bladder, recurrent urethral stricture, increased volume intake, increased urine volume, decreased bladder capacity. Flomax did not help and I worry about starting anticholinergic given patient's age and mirabegron given patient's high blood pressure. Voiding diary seems to indicate normal urine output volumes at night, relatively large volume intake in the 3-4 hours prior to bedtime and overnight. He also has daytime frequency that is not bothersome. He has KACI and uses CPAP infrequently since his surgery.\par  - Behavioral modifications: limit fluid intake prior to bedtime, limit caffeine and alcohol intake, elevate legs 2 hours prior to bedtime, double voiding to ensure complete emptying\par  - Continue flomax for now. \par  - More regular used of CPAP\par  - If no improvement in symptoms, consider adding anticholinergic or B3 agonist\par

## 2021-08-18 NOTE — HISTORY OF PRESENT ILLNESS
[FreeTextEntry1] : 8/13/21: Mr. Carver has a urethral stricture for which he underwent an augmented repair using a circular penile fasciocutaneous flap in October 1997. In July 2013, he had a laser urethrotomy for a focal recurrence. Since then, he has self-dilated every 3-4 weeks using an 18 Fr catheter. \par \par He underwent a renal transplant in April, 2021. He had a UTI after his ureteral stent was removed, but he has otherwise been doing well from the transplant perspective. He has not catheterized since his transplant. He has had increased bothersome LUTS. He has frequency, urgency and nocturia x 7. This is different than prior to transplant. He believes he is making the same amount of urine that he was previously, just with increased irritative symptoms. He does not feel he has a weaker stream than prior. He feels he empties completely. He does not strain. His nephrologist put him on flomax for one month, which did not improve his symptoms at all. He drinks one cup of iced coffee in the morning and infrequent tea or alcohol. He drinks varying amounts of water based on his renal function, but has not noticed any correlation with fluid intake and urinary symptoms.\par \par Sono: 5 PVR\par Flow: Peak 6.1ml/s, 47.3ml volume\par \par 8/18/21: Presents to discuss voiding diary. Reports negative UAs weekly with transplant clinic.\par \par Day 1: 2250 in, 1500 out\par Night 1: 240 in, 600 out\par Day 2: 2040 in, 1750 out\par Night 2: 0 in, 350 out\par Day 3: 1800 in, 2050 out\par Night 3: 0 in, 500 out

## 2021-11-13 NOTE — PROGRESS NOTE ADULT - SUBJECTIVE AND OBJECTIVE BOX
INTERVAL HPI/OVERNIGHT EVENTS: no events overnight,  phos was high 12, and crp 11.45, went off monitor to dialysis, 500 was taken off    SUBJECTIVE: Patient seen and examined at bedside. Comfortable.     OBJECTIVE:    VITAL SIGNS:  ICU Vital Signs Last 24 Hrs  T(C): 36.2 (10 Apr 2020 13:48), Max: 36.3 (10 Apr 2020 09:20)  T(F): 97.1 (10 Apr 2020 13:48), Max: 97.3 (10 Apr 2020 09:20)  HR: 82 (10 Apr 2020 13:10) (73 - 90)  BP: 134/75 (10 Apr 2020 13:10) (114/64 - 145/68)  BP(mean): --  ABP: --  ABP(mean): --  RR: 20 (10 Apr 2020 13:10) (19 - 27)  SpO2: 95% (10 Apr 2020 13:10) (93% - 97%)        04-10 @ 07:01  -  04-10 @ 14:20  --------------------------------------------------------  IN: 0 mL / OUT: 500 mL / NET: -500 mL      CAPILLARY BLOOD GLUCOSE      POCT Blood Glucose.: 110 mg/dL (10 Apr 2020 13:01)      PHYSICAL EXAM:    General: NAD  HEENT: NC/AT; PERRL, clear conjunctiva  Neck: supple  Respiratory: non-labored on venti mask.   Cardiovascular: +S1/S2; RRR  Abdomen: soft, NT/ND; +BS x4  Extremities: WWP, 2+ peripheral pulses b/l; no LE edema  Skin: normal color and turgor; no rash  Neurological:     MEDICATIONS:  MEDICATIONS  (STANDING):  acetaminophen   Tablet .. 975 milliGRAM(s) Oral every 8 hours  albumin human 25% IVPB 50 milliLiter(s) IV Intermittent every 1 hour  allopurinol 100 milliGRAM(s) Oral daily  aspirin enteric coated 81 milliGRAM(s) Oral every 24 hours  atorvastatin 20 milliGRAM(s) Oral at bedtime  dextrose 5%. 1000 milliLiter(s) (50 mL/Hr) IV Continuous <Continuous>  dextrose 50% Injectable 12.5 Gram(s) IV Push once  dextrose 50% Injectable 25 Gram(s) IV Push once  dextrose 50% Injectable 25 Gram(s) IV Push once  doxazosin 8 milliGRAM(s) Oral at bedtime  famotidine    Tablet 20 milliGRAM(s) Oral every 48 hours  heparin  Injectable 5000 Unit(s) SubCutaneous every 8 hours  insulin lispro (HumaLOG) corrective regimen sliding scale   SubCutaneous Before meals and at bedtime  montelukast 10 milliGRAM(s) Oral every 24 hours  PPD  5 Tuberculin Unit(s) Injectable 5 Unit(s) IntraDermal once  sevelamer carbonate 1600 milliGRAM(s) Oral three times a day with meals  sodium zirconium cyclosilicate 10 Gram(s) Oral daily    MEDICATIONS  (PRN):  dextrose 40% Gel 15 Gram(s) Oral once PRN Blood Glucose LESS THAN 70 milliGRAM(s)/deciliter  glucagon  Injectable 1 milliGRAM(s) IntraMuscular once PRN Glucose LESS THAN 70 milligrams/deciliter      ALLERGIES:  Allergies    penicillin (Unknown)  sulfa drugs (Unknown)    Intolerances        LABS:                        10.2   10.97 )-----------( 268      ( 10 Apr 2020 06:51 )             31.4     04-10    139  |  94<L>  |  105<H>  ----------------------------<  111<H>  4.4   |  22  |  8.16<H>    Ca    9.9      10 Apr 2020 06:51  Phos  12.0     04-10  Mg     3.0     04-10    TPro  7.2  /  Alb  3.6  /  TBili  0.3  /  DBili  x   /  AST  33  /  ALT  45  /  AlkPhos  91  04-10          Daily COVID labs  Procalcitonin:   D-dimer: D-Dimer Assay, Quantitative: 164 ng/mL DDU (04-10-20 @ 06:51)  D-Dimer Assay, Quantitative: 221 ng/mL DDU (04-09-20 @ 07:19)  D-Dimer Assay, Quantitative: 208 ng/mL DDU (04-08-20 @ 07:13)    ESR:   CRP: C-Reactive Protein, Serum: 11.45 mg/dL (04-10-20 @ 06:51)  C-Reactive Protein, Serum: 13.83 mg/dL (04-09-20 @ 07:19)  C-Reactive Protein, Serum: 10.95 mg/dL (04-08-20 @ 07:13)    LDH:   Ferritin: Ferritin, Serum: 1992 ng/mL (04-10-20 @ 06:51)  Ferritin, Serum: 1776 ng/mL (04-09-20 @ 07:19)  Ferritin, Serum: 1870 ng/mL (04-08-20 @ 07:13)    Lactate: lc  Trop I:   Ck:     Admission COVID Labs  Full T cell subset:   G6PD:   Immunoglobulins panel:   Quantiferon Gold Tb:   Triglyceride level:           RADIOLOGY & ADDITIONAL TESTS: Reviewed. yosvany

## 2023-01-05 NOTE — PROGRESS NOTE ADULT - I WAS PHYSICALLY PRESENT FOR THE KEY PORTIONS OF THE EVALUATION AND MANAGEMENT (E/M) SERVICE PROVIDED.  I AGREE WITH THE ABOVE HISTORY, PHYSICAL, AND PLAN WHICH I HAVE REVIEWED AND EDITED WHERE APPROPRIATE
Statement Selected
Yes - the patient is able to be screened
Statement Selected

## 2023-06-06 NOTE — ED PROVIDER NOTE - ACUTE OR EVOLVING MI?
Get follow-up venous ultrasound in one month.     2. Follow-up with Vascular Surgery as scheduled.     3. Call us with any questions or concerns ([phone]).   
no

## 2023-11-25 NOTE — ED PROVIDER NOTE - RATE
The wound was explored to base in bloodless field./No foreign body
[No Acute Distress] : no acute distress
The wound was explored to base in bloodless field./No foreign body
[Well Nourished] : well nourished
[Well Developed] : well developed
[Normal Sclera/Conjunctiva] : normal sclera/conjunctiva
[PERRL] : pupils equal round and reactive to light
[Normal Outer Ear/Nose] : the outer ears and nose were normal in appearance
[Normal Oropharynx] : the oropharynx was normal
[Supple] : supple
[No Respiratory Distress] : no respiratory distress 
[Clear to Auscultation] : lungs were clear to auscultation bilaterally
[Normal Rate] : normal rate 
[Regular Rhythm] : with a regular rhythm
[Normal S1, S2] : normal S1 and S2
[Normal Anterior Cervical Nodes] : no anterior cervical lymphadenopathy
[Grossly Normal Strength/Tone] : grossly normal strength/tone
[Normal Gait] : normal gait
[Coordination Grossly Intact] : coordination grossly intact
[Normal Affect] : the affect was normal
[Normal Insight/Judgement] : insight and judgment were intact
86

## 2024-06-27 PROBLEM — N35.819 OTHER STRICTURE OF URETHRA IN MALE: Status: ACTIVE | Noted: 2021-02-02

## 2024-06-27 PROBLEM — N52.01 ERECTILE DYSFUNCTION DUE TO ARTERIAL INSUFFICIENCY: Status: ACTIVE | Noted: 2021-02-02

## 2024-06-27 PROBLEM — R39.15 URINARY URGENCY: Status: ACTIVE | Noted: 2021-08-13

## 2024-06-27 PROBLEM — Q54.1 PENILE HYPOSPADIAS: Status: ACTIVE | Noted: 2021-02-04

## 2024-06-27 PROBLEM — R39.9 LOWER URINARY TRACT SYMPTOMS (LUTS): Status: ACTIVE | Noted: 2021-08-13

## 2024-06-27 PROBLEM — R35.1 NOCTURIA: Status: ACTIVE | Noted: 2021-08-13

## 2024-06-28 ENCOUNTER — APPOINTMENT (OUTPATIENT)
Dept: UROLOGY | Facility: CLINIC | Age: 89
End: 2024-06-28
Payer: MEDICARE

## 2024-06-28 DIAGNOSIS — R39.9 UNSPECIFIED SYMPTOMS AND SIGNS INVOLVING THE GENITOURINARY SYSTEM: ICD-10-CM

## 2024-06-28 DIAGNOSIS — N18.9 CHRONIC KIDNEY DISEASE, UNSPECIFIED: ICD-10-CM

## 2024-06-28 DIAGNOSIS — D63.1 CHRONIC KIDNEY DISEASE, UNSPECIFIED: ICD-10-CM

## 2024-06-28 DIAGNOSIS — K86.2 CYST OF PANCREAS: ICD-10-CM

## 2024-06-28 DIAGNOSIS — N52.01 ERECTILE DYSFUNCTION DUE TO ARTERIAL INSUFFICIENCY: ICD-10-CM

## 2024-06-28 DIAGNOSIS — C44.90 UNSPECIFIED MALIGNANT NEOPLASM OF SKIN, UNSPECIFIED: ICD-10-CM

## 2024-06-28 DIAGNOSIS — N35.819 OTHER URETHRAL STRICTURE, MALE, UNSPECIFIED SITE: ICD-10-CM

## 2024-06-28 DIAGNOSIS — E29.1 TESTICULAR HYPOFUNCTION: ICD-10-CM

## 2024-06-28 DIAGNOSIS — R35.1 NOCTURIA: ICD-10-CM

## 2024-06-28 DIAGNOSIS — R39.15 URGENCY OF URINATION: ICD-10-CM

## 2024-06-28 DIAGNOSIS — N18.5 CHRONIC KIDNEY DISEASE, STAGE 5: ICD-10-CM

## 2024-06-28 DIAGNOSIS — L90.0 LICHEN SCLEROSUS ET ATROPHICUS: ICD-10-CM

## 2024-06-28 DIAGNOSIS — R80.9 PROTEINURIA, UNSPECIFIED: ICD-10-CM

## 2024-06-28 DIAGNOSIS — Q54.1 HYPOSPADIAS, PENILE: ICD-10-CM

## 2024-06-28 PROCEDURE — 99215 OFFICE O/P EST HI 40 MIN: CPT

## 2024-06-28 PROCEDURE — 51741 ELECTRO-UROFLOWMETRY FIRST: CPT

## 2024-06-28 PROCEDURE — 51798 US URINE CAPACITY MEASURE: CPT

## 2024-06-28 PROCEDURE — G2212 PROLONG OUTPT/OFFICE VIS: CPT

## 2024-07-02 LAB — BACTERIA UR CULT: ABNORMAL

## 2024-07-09 RX ORDER — CEPHALEXIN 250 MG/1
250 TABLET ORAL
Qty: 90 | Refills: 0 | Status: ACTIVE | COMMUNITY
Start: 2024-07-09 | End: 1900-01-01

## 2024-08-01 NOTE — ED PROVIDER NOTE - DURATION
Patient understands that nonsurgical management would be the better option.  She will follow-up with medical and radiation oncology.   1/week(s)

## 2024-09-03 NOTE — PHYSICAL EXAM
Called and left VM for patient to give our office a call.    [Alert] : alert [Healthy Appearance] : healthy appearance [de-identified] : hard of hearing

## 2025-02-26 NOTE — DISCHARGE NOTE NURSING/CASE MANAGEMENT/SOCIAL WORK - NSTRANSFERBELONGINGSDISPO_GEN_A_NUR
with patient
patient states that he has a clot to left lower extremity sent by PMD swelling discoloration to LE, denies SOB/CP
Hearing intact (VIII)

## 2025-02-27 NOTE — H&P ADULT - NS ABD PE RECTAL EXAM
Detail Level: Detailed Sunscreen Recommendation Label Override: Elta md pure or blue lizard sensitive. Detail Level: Generalized Detail Level: Zone Detail Level: Simple patient refused

## 2025-04-17 ENCOUNTER — NON-APPOINTMENT (OUTPATIENT)
Age: 89
End: 2025-04-17

## 2025-04-22 ENCOUNTER — APPOINTMENT (OUTPATIENT)
Dept: PULMONOLOGY | Facility: CLINIC | Age: 89
End: 2025-04-22
Payer: MEDICARE

## 2025-04-22 VITALS
HEART RATE: 80 BPM | TEMPERATURE: 98.3 F | SYSTOLIC BLOOD PRESSURE: 121 MMHG | DIASTOLIC BLOOD PRESSURE: 76 MMHG | HEIGHT: 66 IN | OXYGEN SATURATION: 96 % | WEIGHT: 205 LBS | BODY MASS INDEX: 32.95 KG/M2

## 2025-04-22 PROCEDURE — 94010 BREATHING CAPACITY TEST: CPT

## 2025-04-22 PROCEDURE — 99204 OFFICE O/P NEW MOD 45 MIN: CPT | Mod: 25

## 2025-04-22 RX ORDER — IPRATROPIUM BROMIDE 42 UG/1
0.06 SPRAY, METERED NASAL
Qty: 1 | Refills: 10 | Status: ACTIVE | COMMUNITY
Start: 2025-04-22 | End: 1900-01-01

## 2025-05-12 ENCOUNTER — NON-APPOINTMENT (OUTPATIENT)
Age: 89
End: 2025-05-12

## 2025-06-26 ENCOUNTER — NON-APPOINTMENT (OUTPATIENT)
Age: 89
End: 2025-06-26

## 2025-06-26 PROBLEM — Z86.79 HISTORY OF HYPERTENSION: Status: RESOLVED | Noted: 2025-06-26 | Resolved: 2025-06-26

## 2025-06-26 PROBLEM — B35.3 TINEA PEDIS: Status: ACTIVE | Noted: 2025-06-26

## 2025-06-26 PROBLEM — L03.91 ACUTE LYMPHANGITIS: Status: ACTIVE | Noted: 2025-06-26

## 2025-06-26 PROBLEM — Z86.69 HISTORY OF NEUROPATHY: Status: RESOLVED | Noted: 2025-06-26 | Resolved: 2025-06-26

## 2025-06-26 PROBLEM — Z86.39 HISTORY OF HYPERCHOLESTEROLEMIA: Status: RESOLVED | Noted: 2025-06-26 | Resolved: 2025-06-26

## 2025-06-26 PROBLEM — R60.9 EDEMA, UNSPECIFIED: Status: ACTIVE | Noted: 2025-06-26

## 2025-06-26 PROBLEM — L03.039 CELLULITIS OF UNSPECIFIED TOE: Status: ACTIVE | Noted: 2025-06-26

## 2025-06-26 PROBLEM — Z87.448 HISTORY OF RENAL INSUFFICIENCY SYNDROME: Status: RESOLVED | Noted: 2025-06-26 | Resolved: 2025-06-26

## 2025-06-26 RX ORDER — DOCUSATE SODIUM 100 MG/1
100 CAPSULE ORAL
Refills: 0 | Status: ACTIVE | COMMUNITY

## 2025-06-26 RX ORDER — MYCOPHENOLATE MOFETIL 250 MG/1
250 CAPSULE ORAL
Refills: 0 | Status: ACTIVE | COMMUNITY

## 2025-06-26 RX ORDER — VALACYCLOVIR 500 MG/1
500 TABLET, FILM COATED ORAL
Refills: 0 | Status: ACTIVE | COMMUNITY

## 2025-06-26 RX ORDER — ACYCLOVIR 50 MG/G
5 OINTMENT TOPICAL
Refills: 0 | Status: ACTIVE | COMMUNITY

## 2025-06-26 RX ORDER — TESTOSTERONE 20.25 MG/1.25G
20.25 MG/ACT GEL, METERED TRANSDERMAL
Refills: 0 | Status: ACTIVE | COMMUNITY

## 2025-06-26 RX ORDER — FEXOFENADINE HCL 60 MG
60 TABLET ORAL
Refills: 0 | Status: ACTIVE | COMMUNITY

## 2025-06-26 RX ORDER — ROSUVASTATIN CALCIUM 20 MG/1
20 TABLET, FILM COATED ORAL
Refills: 0 | Status: ACTIVE | COMMUNITY

## 2025-06-26 RX ORDER — FINASTERIDE 5 MG/1
5 TABLET, FILM COATED ORAL
Refills: 0 | Status: ACTIVE | COMMUNITY

## 2025-06-26 RX ORDER — TIRZEPATIDE 12.5 MG/.5ML
12.5 INJECTION, SOLUTION SUBCUTANEOUS
Refills: 0 | Status: ACTIVE | COMMUNITY

## 2025-06-26 RX ORDER — FUROSEMIDE 40 MG/1
40 TABLET ORAL
Refills: 0 | Status: ACTIVE | COMMUNITY

## 2025-06-26 RX ORDER — TACROLIMUS 1 MG/1
1 CAPSULE ORAL
Refills: 0 | Status: ACTIVE | COMMUNITY

## 2025-06-26 RX ORDER — TAMSULOSIN HYDROCHLORIDE 0.4 MG/1
0.4 CAPSULE ORAL
Refills: 0 | Status: ACTIVE | COMMUNITY

## 2025-06-30 NOTE — DISCHARGE NOTE PROVIDER - NSDCHHCONTACT_GEN_ALL_CORE_FT
[employer] Alert Outreach Telephone Call Attempt     Patient is being outreached by the [facility]itions Program for a clinical alert from the [employer] monitoring program.     Call Attempt Date: 6/30/2025    Call Attempt: First Attempt    As certified below, I, or a nurse practitioner or physician assistant working with me, had a face-to-face encounter that meets the physician face-to-face encounter requirements.

## 2025-08-07 ENCOUNTER — APPOINTMENT (OUTPATIENT)
Age: 89
End: 2025-08-07

## 2025-08-07 DIAGNOSIS — E10.42 TYPE 1 DIABETES MELLITUS WITH DIABETIC POLYNEUROPATHY: ICD-10-CM

## 2025-08-07 DIAGNOSIS — L60.0 INGROWING NAIL: ICD-10-CM

## 2025-08-07 PROCEDURE — 11721 DEBRIDE NAIL 6 OR MORE: CPT

## 2025-08-07 PROCEDURE — 99203 OFFICE O/P NEW LOW 30 MIN: CPT | Mod: 25
